# Patient Record
Sex: FEMALE | Race: WHITE | Employment: OTHER | URBAN - METROPOLITAN AREA
[De-identification: names, ages, dates, MRNs, and addresses within clinical notes are randomized per-mention and may not be internally consistent; named-entity substitution may affect disease eponyms.]

---

## 2018-09-19 PROBLEM — E03.9 ACQUIRED HYPOTHYROIDISM: Status: ACTIVE | Noted: 2018-09-19

## 2018-09-19 PROBLEM — E11.9 TYPE 2 DIABETES MELLITUS WITHOUT COMPLICATION, WITHOUT LONG-TERM CURRENT USE OF INSULIN (HCC): Status: ACTIVE | Noted: 2018-09-19

## 2018-09-19 PROBLEM — I48.91 ATRIAL FIBRILLATION (HCC): Status: ACTIVE | Noted: 2018-09-19

## 2018-09-19 PROBLEM — F03.90 DEMENTIA WITHOUT BEHAVIORAL DISTURBANCE (HCC): Status: ACTIVE | Noted: 2018-09-19

## 2018-09-19 PROBLEM — E66.01 SEVERE OBESITY (BMI 35.0-39.9): Status: ACTIVE | Noted: 2018-09-19

## 2018-10-06 ENCOUNTER — HOSPITAL ENCOUNTER (OUTPATIENT)
Dept: MAMMOGRAPHY | Age: 68
Discharge: HOME | End: 2018-10-06
Attending: FAMILY MEDICINE
Payer: MEDICARE

## 2018-10-06 DIAGNOSIS — Z12.39 SCREENING FOR MALIGNANT NEOPLASM OF BREAST: ICD-10-CM

## 2018-10-06 PROCEDURE — 77067 SCR MAMMO BI INCL CAD: CPT

## 2018-10-10 ENCOUNTER — HOSPITAL ENCOUNTER (OUTPATIENT)
Dept: MAMMOGRAPHY | Age: 68
Discharge: HOME | End: 2018-10-10
Attending: FAMILY MEDICINE
Payer: MEDICARE

## 2018-10-10 DIAGNOSIS — R92.8 ABNORMAL SCREENING MAMMOGRAM: ICD-10-CM

## 2018-10-10 PROCEDURE — 76642 ULTRASOUND BREAST LIMITED: CPT

## 2018-10-17 PROBLEM — N85.8 UTERINE MASS: Status: ACTIVE | Noted: 2018-10-17

## 2018-10-19 PROBLEM — Z79.01 CHRONIC ANTICOAGULATION: Status: ACTIVE | Noted: 2018-10-19

## 2018-10-19 PROBLEM — E66.09 CLASS 2 OBESITY DUE TO EXCESS CALORIES WITHOUT SERIOUS COMORBIDITY WITH BODY MASS INDEX (BMI) OF 37.0 TO 37.9 IN ADULT: Status: ACTIVE | Noted: 2018-09-19

## 2018-10-30 ENCOUNTER — HOSPITAL ENCOUNTER (OUTPATIENT)
Dept: MRI IMAGING | Age: 68
Discharge: HOME | End: 2018-10-30
Attending: OBSTETRICS & GYNECOLOGY
Payer: MEDICARE

## 2018-10-30 DIAGNOSIS — N85.8 UTERINE MASS: ICD-10-CM

## 2018-10-30 PROCEDURE — A9575 INJ GADOTERATE MEGLUMI 0.1ML: HCPCS | Performed by: OBSTETRICS & GYNECOLOGY

## 2018-10-30 PROCEDURE — 74011250636 HC RX REV CODE- 250/636: Performed by: OBSTETRICS & GYNECOLOGY

## 2018-10-30 PROCEDURE — 72197 MRI PELVIS W/O & W/DYE: CPT

## 2018-10-30 RX ORDER — GADOTERATE MEGLUMINE 376.9 MG/ML
21 INJECTION INTRAVENOUS
Status: COMPLETED | OUTPATIENT
Start: 2018-10-30 | End: 2018-10-30

## 2018-10-30 RX ORDER — SODIUM CHLORIDE 0.9 % (FLUSH) 0.9 %
10 SYRINGE (ML) INJECTION
Status: COMPLETED | OUTPATIENT
Start: 2018-10-30 | End: 2018-10-30

## 2018-10-30 RX ADMIN — Medication 10 ML: at 18:33

## 2018-10-30 RX ADMIN — GADOTERATE MEGLUMINE 21 ML: 376.9 INJECTION INTRAVENOUS at 18:32

## 2019-01-14 PROBLEM — E11.21 TYPE 2 DIABETES WITH NEPHROPATHY (HCC): Status: ACTIVE | Noted: 2019-01-14

## 2019-01-14 PROBLEM — E66.01 SEVERE OBESITY (HCC): Status: ACTIVE | Noted: 2019-01-14

## 2019-04-22 ENCOUNTER — HOSPITAL ENCOUNTER (EMERGENCY)
Age: 69
Discharge: HOME | End: 2019-04-22
Attending: EMERGENCY MEDICINE
Payer: MEDICARE

## 2019-04-22 ENCOUNTER — APPOINTMENT (OUTPATIENT)
Dept: GENERAL RADIOLOGY | Age: 69
End: 2019-04-22
Attending: EMERGENCY MEDICINE
Payer: MEDICARE

## 2019-04-22 VITALS
TEMPERATURE: 97.6 F | DIASTOLIC BLOOD PRESSURE: 86 MMHG | SYSTOLIC BLOOD PRESSURE: 139 MMHG | HEART RATE: 63 BPM | RESPIRATION RATE: 16 BRPM | OXYGEN SATURATION: 96 %

## 2019-04-22 DIAGNOSIS — M25.562 ARTHRALGIA OF LEFT KNEE: Primary | ICD-10-CM

## 2019-04-22 PROCEDURE — 74011250637 HC RX REV CODE- 250/637: Performed by: NURSE PRACTITIONER

## 2019-04-22 PROCEDURE — 99283 EMERGENCY DEPT VISIT LOW MDM: CPT | Performed by: NURSE PRACTITIONER

## 2019-04-22 PROCEDURE — 73562 X-RAY EXAM OF KNEE 3: CPT

## 2019-04-22 RX ORDER — ACETAMINOPHEN 325 MG/1
650 TABLET ORAL
Qty: 20 TAB | Refills: 0 | Status: SHIPPED | OUTPATIENT
Start: 2019-04-22 | End: 2021-06-04 | Stop reason: SDUPTHER

## 2019-04-22 RX ORDER — ACETAMINOPHEN 325 MG/1
650 TABLET ORAL
Status: COMPLETED | OUTPATIENT
Start: 2019-04-22 | End: 2019-04-22

## 2019-04-22 RX ADMIN — ACETAMINOPHEN 650 MG: 325 TABLET, FILM COATED ORAL at 16:27

## 2019-04-22 NOTE — ED TRIAGE NOTES
Patient presents to ed via ems from [facility] for left knee pain that has been ongoing for some time but is worse today and she states she is unable to bear weight on it to ambulate with her walker. Patient denies any new injury to knee and states pain is to the back of her knee.

## 2019-04-22 NOTE — PROGRESS NOTES
Met with patient and family in [facility] per NP request.  Patient and daughter state she is currently living at [facility]. Patient states she uses a walker but cannot put weight on knee. Per NP, patient does not have restrictions on knee at this time but patient states too painful. Daughter concerned that [facility] will not let her return or help her. Spoke with staff at [facility], they state she can use a W/C at facility and they have one she can use / they also state they have aides \"24/7\" to assist.  They state they have therapy there too if needed. Per NP patient will follow up with POA and they will make decision on any therapy at that time. Daughter and patient requesting Truett Gent back to facility / nurse and NP aware.

## 2019-04-22 NOTE — ED PROVIDER NOTES
[address] Emergency Department TRIAGE Provider NOTE:  
79 yo female presents with left knee pain for a few years. Suddenly pain worsened this morning and unable to walk. Knee buckled after standing today. No fall. No injury or fever. Knee swelling and tenderness on exam. Able to hold leg in extension without support but with pain. Appropriate tests ordered. Awaiting room. Capri Nichole MD; 4/22/2019 @2:46 PM=========================================== The history is provided by the patient. Past Medical History:  
Diagnosis Date  Atrial fibrillation ([facility] [address] [address]  Colon cancer ([facility] [address] 1999  Dementia   
 with memory loss  [facility]  Menopause  Thyroid disease  Uterine mass Past Surgical History:  
Procedure Laterality Date [address] Family History:  
Problem Relation Age of Onset  Diabetes Mother  Diabetes Father Social History Socioeconomic History  Marital status:  Spouse name: Not on file  Number of children: Not on file  Years of education: Not on file  Highest education level: Not on file Occupational History  Not on file Social Needs  Financial resource strain: Not on file  Food insecurity:  
  Worry: Not on file Inability: Not on file  Transportation needs:  
  Medical: Not on file Non-medical: Not on file Tobacco Use  Smoking status: Never Smoker  Smokeless tobacco: Never Used Substance and Sexual Activity  Alcohol use: No  
 Drug use: No  
 Sexual activity: Not on file Lifestyle  Physical activity:  
  Days per week: Not on file Minutes per session: Not on file  Stress: Not on file Relationships  Social connections:  
  Talks on phone: Not on file Gets together: Not on file Attends [employer] service: Not on file Active member of club or organization: Not on file Attends meetings of clubs or organizations: Not on file Relationship status: Not on file  Intimate partner violence:  
  Fear of current or ex partner: Not on file Emotionally abused: Not on file Physically abused: Not on file Forced sexual activity: Not on file Other Topics Concern  Not on file Social History Narrative  Not on file ALLERGIES: Patient has no known allergies. Review of Systems Constitutional: Negative for chills and fever. Gastrointestinal: Negative for abdominal pain. Musculoskeletal: Positive for arthralgias and joint swelling. Skin: Negative for color change. Visit Vitals /86 Pulse 63 Temp 97.6 °F (36.4 °C) Resp 16 SpO2 96% Physical Exam  
Constitutional: She is oriented to person, place, and time. No distress. HENT:  
Head: Normocephalic and atraumatic. Eyes: Conjunctivae and EOM are normal.  
Neck: Normal range of motion. Neck supple. Cardiovascular: Normal rate and regular rhythm. Pulmonary/Chest: Effort normal and breath sounds normal.  
Musculoskeletal:  
     Left knee: She exhibits swelling. She exhibits normal range of motion and no erythema. Tenderness found. Patellar tendon tenderness noted. Neurological: She is alert and oriented to person, place, and time. Skin: Skin is warm and dry. She is not diaphoretic. Psychiatric: She has a normal mood and affect. Her behavior is normal.  
Nursing note and vitals reviewed. Xr Knee Lt 3 V Result Date: 4/22/2019 Left knee Clinical indication: Patient with acute worsening of chronic moderate to severe left knee pain, now unable to bear weight Comparison: none Technique: 3 views Findings: There is no evidence of fracture, dislocation, or erosion. Overall the bone density is low which can limit sensitivity for subtle bony lesions.  Within the proximal shaft of the tibia centrally there is a 1.5 cm well-defined sclerotic lesion with chondroid matrix suggesting a chronic incidental enchondroma. There is moderate to marked chronic appearing tricompartmental degenerative osteoarthrosis change, most probably seen at the medial femoral-tibial level demonstrated by joint space narrowing, cortical sclerosis, and osteophyte formation. No definite joint effusion is seen. There are small benign accessory ossicles posteriorly. IMPRESSION: 1. No acute osseous abnormality. 2. Low bone density, degenerative change. MDM Number of Diagnoses or Management Options Arthralgia of left knee: new and requires workup Diagnosis management comments: Xray negative for acute changes. Ace wrap applied and patient referred to orthopedics for follow up. Amount and/or Complexity of Data Reviewed Tests in the radiology section of CPT®: ordered and reviewed Tests in the medicine section of CPT®: ordered Risk of Complications, Morbidity, and/or Mortality Presenting problems: low Diagnostic procedures: low Management options: low Patient Progress Patient progress: stable ED Course as of Apr 22 1631 Mon Apr 22, 2019  
[address]  is coming to speak with patient regarding discharge. [JM] ED Course User Index [JM] Debbi Davies, APRN Procedures

## 2019-04-22 NOTE — DISCHARGE INSTRUCTIONS
Wear your ace wrap for comfort and support during the day. Use your wheelchair as needed for pain. Elevate your knee and use ice. Tylenol as needed for pain. Call orthopedics to schedule a follow up appointment. Return to the Emergency Department as needed.

## 2019-04-25 PROBLEM — E78.00 PURE HYPERCHOLESTEROLEMIA: Status: ACTIVE | Noted: 2019-04-25

## 2019-11-16 ENCOUNTER — HOSPITAL ENCOUNTER (OUTPATIENT)
Dept: MAMMOGRAPHY | Age: 69
Discharge: HOME | End: 2019-11-16
Attending: FAMILY MEDICINE
Payer: MEDICARE

## 2019-11-16 DIAGNOSIS — Z12.31 VISIT FOR SCREENING MAMMOGRAM: ICD-10-CM

## 2019-11-16 PROCEDURE — 77067 SCR MAMMO BI INCL CAD: CPT

## 2022-03-18 PROBLEM — F03.90 DEMENTIA WITHOUT BEHAVIORAL DISTURBANCE (HCC): Status: ACTIVE | Noted: 2018-09-19

## 2022-03-18 PROBLEM — E66.09 CLASS 2 OBESITY DUE TO EXCESS CALORIES WITHOUT SERIOUS COMORBIDITY WITH BODY MASS INDEX (BMI) OF 37.0 TO 37.9 IN ADULT: Status: ACTIVE | Noted: 2018-09-19

## 2022-03-18 PROBLEM — E11.9 TYPE 2 DIABETES MELLITUS WITHOUT COMPLICATION, WITHOUT LONG-TERM CURRENT USE OF INSULIN (HCC): Status: ACTIVE | Noted: 2018-09-19

## 2022-03-18 PROBLEM — E11.21 TYPE 2 DIABETES WITH NEPHROPATHY (HCC): Status: ACTIVE | Noted: 2019-01-14

## 2022-03-19 PROBLEM — E03.9 ACQUIRED HYPOTHYROIDISM: Status: ACTIVE | Noted: 2018-09-19

## 2022-03-19 PROBLEM — Z79.01 CHRONIC ANTICOAGULATION: Status: ACTIVE | Noted: 2018-10-19

## 2022-03-19 PROBLEM — E66.01 SEVERE OBESITY (HCC): Status: ACTIVE | Noted: 2019-01-14

## 2022-03-19 PROBLEM — I48.91 ATRIAL FIBRILLATION (HCC): Status: ACTIVE | Noted: 2018-09-19

## 2022-03-19 PROBLEM — N85.8 UTERINE MASS: Status: ACTIVE | Noted: 2018-10-17

## 2022-03-19 PROBLEM — E78.00 HYPERCHOLESTEROLEMIA: Status: ACTIVE | Noted: 2019-04-25

## 2022-06-12 ENCOUNTER — PATIENT MESSAGE (OUTPATIENT)
Dept: FAMILY MEDICINE CLINIC | Facility: CLINIC | Age: 72
End: 2022-06-12

## 2022-06-13 LAB
MM INDURATION, POC: 0 MM (ref 0–5)
PPD, POC: NEGATIVE

## 2022-06-13 NOTE — TELEPHONE ENCOUNTER
From: Casi Moreland  To: Nunu King  Sent: 6/12/2022 12:24 PM EDT  Subject: Orders to Check Glucose     Hello,  [facility] doesn't have orders to check Eileen's glucose. Yesterday, she was not very responsive. Med Marilyn checked her sugar level--it was 38. Medics were called and said normal is 80 to 240. I took a photo of the last orders that were given to Tonja, and I did not see an order to check for glucose. Please look into this. Altagracia Escobar has been a bit non responsive for the last three weeks, not going to dinner and maybe because no one is checking her sugar levels. Please let me know if you will fax [phone] orders or what happened to the orders. Also attaching \"Authorization for Release of Protected Health Information\" as [facility] staff they did not have it on file. Also, yeast infection in groin is terrible. Under breast cleared. Thanks for all you do, please keep me informed.

## 2022-06-17 RX ORDER — LANCETS 28 GAUGE
EACH MISCELLANEOUS
Qty: 100 EACH | Refills: 3 | Status: SHIPPED | OUTPATIENT
Start: 2022-06-17

## 2022-06-17 RX ORDER — BLOOD-GLUCOSE METER
KIT MISCELLANEOUS
Qty: 100 STRIP | Refills: 3 | Status: SHIPPED | OUTPATIENT
Start: 2022-06-17

## 2022-08-11 ENCOUNTER — APPOINTMENT (OUTPATIENT)
Dept: GENERAL RADIOLOGY | Age: 72
DRG: 556 | End: 2022-08-11
Payer: MEDICARE

## 2022-08-11 ENCOUNTER — APPOINTMENT (OUTPATIENT)
Dept: CT IMAGING | Age: 72
DRG: 556 | End: 2022-08-11
Payer: MEDICARE

## 2022-08-11 ENCOUNTER — HOSPITAL ENCOUNTER (EMERGENCY)
Age: 72
Discharge: NURSING, REHAB OR LONG-TERM CARE | DRG: 556 | End: 2022-08-12
Attending: EMERGENCY MEDICINE
Payer: MEDICARE
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
## 2022-08-11 DIAGNOSIS — M25.562 ACUTE PAIN OF LEFT KNEE: ICD-10-CM

## 2022-08-11 DIAGNOSIS — R26.2 INABILITY TO WALK: ICD-10-CM

## 2022-08-11 PROCEDURE — 72192 CT PELVIS W/O DYE: CPT

## 2022-08-11 PROCEDURE — 73562 X-RAY EXAM OF KNEE 3: CPT

## 2022-08-11 PROCEDURE — 99285 EMERGENCY DEPT VISIT HI MDM: CPT

## 2022-08-11 PROCEDURE — 6370000000 HC RX 637 (ALT 250 FOR IP): Performed by: EMERGENCY MEDICINE

## 2022-08-11 PROCEDURE — 70450 CT HEAD/BRAIN W/O DYE: CPT

## 2022-08-11 RX ORDER — ACETAMINOPHEN 500 MG
1000 TABLET ORAL
Status: COMPLETED | OUTPATIENT
Start: 2022-08-11 | End: 2022-08-11

## 2022-08-11 RX ADMIN — ACETAMINOPHEN 1000 MG: 500 TABLET, FILM COATED ORAL at 18:58

## 2022-08-11 ASSESSMENT — ENCOUNTER SYMPTOMS
VOICE CHANGE: 0
FACIAL SWELLING: 0
COUGH: 0
NAUSEA: 0
BACK PAIN: 0
EYE PAIN: 0
CHEST TIGHTNESS: 0
SORE THROAT: 0
SHORTNESS OF BREATH: 0
TROUBLE SWALLOWING: 0
VOMITING: 0
ABDOMINAL PAIN: 0

## 2022-08-11 ASSESSMENT — PAIN SCALES - GENERAL
PAINLEVEL_OUTOF10: 0
PAINLEVEL_OUTOF10: 10
PAINLEVEL_OUTOF10: 0

## 2022-08-11 ASSESSMENT — PAIN - FUNCTIONAL ASSESSMENT: PAIN_FUNCTIONAL_ASSESSMENT: 0-10

## 2022-08-11 NOTE — ED PROVIDER NOTES
[employer] Emergency Department Provider Note                   PCP:                Kenneth Parra, APRN - CNP               Age: 67 y.o. Sex: female       ICD-10-CM    1. Fall from ground level  W18.30XA       2. Acute pain of left knee  M25.562       3. Inability to walk  R26.2           DISPOSITION Decision To Transfer 08/11/2022 07:34:37 PM        MDM  Number of Diagnoses or Management Options  Acute pain of left knee  Fall from ground level  Inability to walk  Diagnosis management comments: 70-year-old female presents emerged department via EMS with chief complaint of inability to walk and left knee pain. X-rays from triage showed no evidence of any acute fractures of the left knee. She is actually have able to range of motion of her left knee pretty well. She does seem however fairly weak. CT of the head and CT of the pelvis were obtained. Tylenol for pain control. Shows no evidence of any acute intracranial process  CT of the pelvis shows no evidence of any acute fractures. There is an unknown pelvic mass seen there which they recommend OB follow-up. X-ray of her knee showed no evidence of any acute fractures. The patient is remained to be nontoxic in appearance I told her that she needs to ambulate before she can go home. Every time we try to get up out of bed without assistance her left knee sandeep and she will fall to the floor if we are not there at hold her. Daughter states that the this is living is refusing to take the patient back if she is unable to ambulate on her own. At this point I do think patient probably should be admitted for inability to ambulate and will need to be placed into rehab facility she may even need to have an MRI of the knee to see if there is any ligamentous injury. I Have contacted the on-call hospitalist to see if they are agreeable for admission.          Orders Placed This Encounter   Procedures    XR KNEE LEFT (3 VIEWS)    CT HEAD WO CONTRAST    CT PELVIS fibrillation ([facility] [address] [facility]     Colon cancer ([facility] [address] [facility] 1999    Dementia ([facility] [address] [facility]     with memory loss    Diabetes ([facility] [address] [facility]     Menopause     Thyroid disease     Uterine mass         Past Surgical History:   Procedure Laterality Date    TOTAL COLECTOMY  1999        Family History   Problem Relation Age of Onset    Diabetes Mother     Diabetes Father         Social History     Socioeconomic History    Marital status:    Tobacco Use    Smoking status: Never    Smokeless tobacco: Never   Substance and Sexual Activity    Alcohol use: No    Drug use: No         Patient has no known allergies. Discharge Medication List as of 8/12/2022  1:17 AM        CONTINUE these medications which have NOT CHANGED    Details   FREESTYLE LITE strip USE TO CHECK BLOOD GLUCOSE ONCE DAILY, Disp-100 strip, R-3Normal      FreeStyle Lancets MISC Disp-100 each, R-3, Normal      acetaminophen (TYLENOL) 325 MG tablet Take 650 mg by mouth every 4 hours as neededHistorical Med      ascorbic acid (VITAMIN C) 250 MG tablet Take 250 mg by mouth dailyHistorical Med      atorvastatin (LIPITOR) 40 MG tablet TAKE 1 TABLET DAILYHistorical Med      levothyroxine (SYNTHROID) 75 MCG tablet TAKE 1 TABLET DAILY BEFORE BREAKFASTHistorical Med      memantine (NAMENDA) 10 MG tablet TAKE 1 TABLET TWICE A DAYHistorical Med      nystatin (MYCOSTATIN) 410722 UNIT/GM powder Apply powder to skin folds between breasts and abdomen 4 times daily, Historical Med      SITagliptin (JANUVIA) 100 MG tablet Take 100 mg by mouth dailyHistorical Med      glyBURIDE-metFORMIN (GLUCOVANCE) 2.5-500 MG per tablet TAKE 2 TABLETS TWICE A DAY WITH MEALSHistorical Med      metoprolol succinate (TOPROL XL) 25 MG extended release tablet Take 25 mg by mouth dailyHistorical Med              Vitals signs and nursing note reviewed. No data found. Physical Exam  Vitals and nursing note reviewed. Constitutional:       General: She is not in acute distress. Appearance: Normal appearance.  She is not ill-appearing. HENT:      Head: Normocephalic and atraumatic. Right Ear: Tympanic membrane normal.      Left Ear: Tympanic membrane normal.      Mouth/Throat:      Mouth: Mucous membranes are moist.      Pharynx: Oropharynx is clear. Eyes:      Extraocular Movements: Extraocular movements intact. Pupils: Pupils are equal, round, and reactive to light. Cardiovascular:      Rate and Rhythm: Normal rate and regular rhythm. Heart sounds: No murmur heard. Pulmonary:      Effort: No respiratory distress. Breath sounds: No wheezing or rhonchi. Abdominal:      Palpations: Abdomen is soft. There is no mass. Tenderness: There is no abdominal tenderness. There is no guarding. Musculoskeletal:         General: No swelling or tenderness. Cervical back: Normal range of motion and neck supple. No rigidity or tenderness. Skin:     General: Skin is warm and dry. Capillary Refill: Capillary refill takes less than 2 seconds. Neurological:      General: No focal deficit present. Mental Status: She is alert and oriented to person, place, and time. Mental status is at baseline. Psychiatric:         Mood and Affect: Mood normal.         Behavior: Behavior normal.        Procedures      Labs Reviewed - No data to display     CT HEAD WO CONTRAST   Final Result   1. No acute intracranial process evident by noncontrast CT study of the head. This report was made using voice transcription. Despite my best efforts to avoid   any, transcription errors may persist. If there is any question about the   accuracy of the report or need for clarification, then please call [phone], or text me through perfectserv for clarification or correction. CT PELVIS WO CONTRAST Additional Contrast? None   Final Result   1. No evidence of pelvic or hip fracture. Hips are located bilaterally.    2. Large soft tissue pelvic mass measuring up to 15 cm likely arising from the

## 2022-08-12 ENCOUNTER — HOSPITAL ENCOUNTER (INPATIENT)
Age: 72
LOS: 5 days | Discharge: NURSING, REHAB OR LONG-TERM CARE | DRG: 556 | End: 2022-08-17
Attending: FAMILY MEDICINE
Payer: MEDICARE

## 2022-08-12 VITALS
TEMPERATURE: 98 F | BODY MASS INDEX: 44.72 KG/M2 | OXYGEN SATURATION: 93 % | HEART RATE: 87 BPM | RESPIRATION RATE: 18 BRPM | DIASTOLIC BLOOD PRESSURE: 61 MMHG | SYSTOLIC BLOOD PRESSURE: 129 MMHG | WEIGHT: 290 LBS

## 2022-08-12 PROBLEM — N85.8 UTERINE MASS: Status: ACTIVE | Noted: 2018-10-17

## 2022-08-12 PROBLEM — I48.91 ATRIAL FIBRILLATION (HCC): Status: ACTIVE | Noted: 2018-09-19

## 2022-08-12 PROBLEM — F03.90 DEMENTIA WITHOUT BEHAVIORAL DISTURBANCE (HCC): Status: ACTIVE | Noted: 2018-09-19

## 2022-08-12 PROBLEM — E03.9 ACQUIRED HYPOTHYROIDISM: Status: ACTIVE | Noted: 2018-09-19

## 2022-08-12 PROBLEM — E11.9 TYPE 2 DIABETES MELLITUS WITHOUT COMPLICATION, WITHOUT LONG-TERM CURRENT USE OF INSULIN (HCC): Status: ACTIVE | Noted: 2018-09-19

## 2022-08-12 LAB
ANION GAP SERPL CALC-SCNC: 7 MMOL/L (ref 7–16)
BUN SERPL-MCNC: 19 MG/DL (ref 8–23)
CALCIUM SERPL-MCNC: 8.9 MG/DL (ref 8.3–10.4)
CHLORIDE SERPL-SCNC: 110 MMOL/L (ref 98–107)
CO2 SERPL-SCNC: 24 MMOL/L (ref 21–32)
CREAT SERPL-MCNC: 0.9 MG/DL (ref 0.6–1)
GLUCOSE SERPL-MCNC: 124 MG/DL (ref 65–100)
POTASSIUM SERPL-SCNC: 4 MMOL/L (ref 3.5–5.1)
SODIUM SERPL-SCNC: 141 MMOL/L (ref 136–145)

## 2022-08-12 PROCEDURE — 97530 THERAPEUTIC ACTIVITIES: CPT

## 2022-08-12 PROCEDURE — 80048 BASIC METABOLIC PNL TOTAL CA: CPT

## 2022-08-12 PROCEDURE — 2500000003 HC RX 250 WO HCPCS: Performed by: FAMILY MEDICINE

## 2022-08-12 PROCEDURE — 2580000003 HC RX 258: Performed by: FAMILY MEDICINE

## 2022-08-12 PROCEDURE — 6360000002 HC RX W HCPCS: Performed by: FAMILY MEDICINE

## 2022-08-12 PROCEDURE — 6370000000 HC RX 637 (ALT 250 FOR IP): Performed by: FAMILY MEDICINE

## 2022-08-12 PROCEDURE — 97166 OT EVAL MOD COMPLEX 45 MIN: CPT

## 2022-08-12 PROCEDURE — 97112 NEUROMUSCULAR REEDUCATION: CPT

## 2022-08-12 PROCEDURE — 1100000000 HC RM PRIVATE

## 2022-08-12 PROCEDURE — 97162 PT EVAL MOD COMPLEX 30 MIN: CPT

## 2022-08-12 RX ORDER — HYDROCODONE BITARTRATE AND ACETAMINOPHEN 5; 325 MG/1; MG/1
1 TABLET ORAL EVERY 6 HOURS PRN
Status: DISCONTINUED | OUTPATIENT
Start: 2022-08-12 | End: 2022-08-17 | Stop reason: HOSPADM

## 2022-08-12 RX ORDER — METOPROLOL SUCCINATE 25 MG/1
25 TABLET, EXTENDED RELEASE ORAL DAILY
Status: DISCONTINUED | OUTPATIENT
Start: 2022-08-12 | End: 2022-08-17 | Stop reason: HOSPADM

## 2022-08-12 RX ORDER — ACETAMINOPHEN 325 MG/1
650 TABLET ORAL EVERY 6 HOURS PRN
Status: DISCONTINUED | OUTPATIENT
Start: 2022-08-12 | End: 2022-08-17 | Stop reason: HOSPADM

## 2022-08-12 RX ORDER — POLYETHYLENE GLYCOL 3350 17 G/17G
17 POWDER, FOR SOLUTION ORAL DAILY PRN
Status: DISCONTINUED | OUTPATIENT
Start: 2022-08-12 | End: 2022-08-17 | Stop reason: HOSPADM

## 2022-08-12 RX ORDER — SODIUM CHLORIDE 9 MG/ML
INJECTION, SOLUTION INTRAVENOUS PRN
Status: DISCONTINUED | OUTPATIENT
Start: 2022-08-12 | End: 2022-08-17 | Stop reason: HOSPADM

## 2022-08-12 RX ORDER — LEVOTHYROXINE SODIUM 0.07 MG/1
75 TABLET ORAL DAILY
Status: DISCONTINUED | OUTPATIENT
Start: 2022-08-12 | End: 2022-08-17 | Stop reason: HOSPADM

## 2022-08-12 RX ORDER — ONDANSETRON 4 MG/1
4 TABLET, ORALLY DISINTEGRATING ORAL EVERY 8 HOURS PRN
Status: DISCONTINUED | OUTPATIENT
Start: 2022-08-12 | End: 2022-08-17 | Stop reason: HOSPADM

## 2022-08-12 RX ORDER — MEMANTINE HYDROCHLORIDE 5 MG/1
10 TABLET ORAL 2 TIMES DAILY
Status: DISCONTINUED | OUTPATIENT
Start: 2022-08-12 | End: 2022-08-17 | Stop reason: HOSPADM

## 2022-08-12 RX ORDER — ONDANSETRON 2 MG/ML
4 INJECTION INTRAMUSCULAR; INTRAVENOUS EVERY 6 HOURS PRN
Status: DISCONTINUED | OUTPATIENT
Start: 2022-08-12 | End: 2022-08-17 | Stop reason: HOSPADM

## 2022-08-12 RX ORDER — ACETAMINOPHEN 650 MG/1
650 SUPPOSITORY RECTAL EVERY 6 HOURS PRN
Status: DISCONTINUED | OUTPATIENT
Start: 2022-08-12 | End: 2022-08-17 | Stop reason: HOSPADM

## 2022-08-12 RX ORDER — ATORVASTATIN CALCIUM 40 MG/1
40 TABLET, FILM COATED ORAL DAILY
Status: DISCONTINUED | OUTPATIENT
Start: 2022-08-12 | End: 2022-08-17 | Stop reason: HOSPADM

## 2022-08-12 RX ORDER — ENOXAPARIN SODIUM 100 MG/ML
30 INJECTION SUBCUTANEOUS EVERY 12 HOURS
Status: DISCONTINUED | OUTPATIENT
Start: 2022-08-12 | End: 2022-08-17 | Stop reason: HOSPADM

## 2022-08-12 RX ORDER — SODIUM CHLORIDE 0.9 % (FLUSH) 0.9 %
5-40 SYRINGE (ML) INJECTION PRN
Status: DISCONTINUED | OUTPATIENT
Start: 2022-08-12 | End: 2022-08-17 | Stop reason: HOSPADM

## 2022-08-12 RX ORDER — SODIUM CHLORIDE 0.9 % (FLUSH) 0.9 %
5-40 SYRINGE (ML) INJECTION EVERY 12 HOURS SCHEDULED
Status: DISCONTINUED | OUTPATIENT
Start: 2022-08-12 | End: 2022-08-17 | Stop reason: HOSPADM

## 2022-08-12 RX ADMIN — METOPROLOL SUCCINATE 25 MG: 25 TABLET, EXTENDED RELEASE ORAL at 08:24

## 2022-08-12 RX ADMIN — LEVOTHYROXINE SODIUM 75 MCG: 0.07 TABLET ORAL at 06:37

## 2022-08-12 RX ADMIN — MEMANTINE HYDROCHLORIDE 10 MG: 5 TABLET, FILM COATED ORAL at 20:45

## 2022-08-12 RX ADMIN — ENOXAPARIN SODIUM 30 MG: 100 INJECTION SUBCUTANEOUS at 08:24

## 2022-08-12 RX ADMIN — ENOXAPARIN SODIUM 30 MG: 100 INJECTION SUBCUTANEOUS at 20:45

## 2022-08-12 RX ADMIN — ATORVASTATIN CALCIUM 40 MG: 40 TABLET, FILM COATED ORAL at 08:23

## 2022-08-12 RX ADMIN — TUBERCULIN PURIFIED PROTEIN DERIVATIVE 5 UNITS: 5 INJECTION, SOLUTION INTRADERMAL at 06:35

## 2022-08-12 RX ADMIN — MEMANTINE HYDROCHLORIDE 10 MG: 5 TABLET, FILM COATED ORAL at 08:24

## 2022-08-12 RX ADMIN — SODIUM CHLORIDE, PRESERVATIVE FREE 10 ML: 5 INJECTION INTRAVENOUS at 08:26

## 2022-08-12 ASSESSMENT — PAIN SCALES - GENERAL
PAINLEVEL_OUTOF10: 0
PAINLEVEL_OUTOF10: 0
PAINLEVEL_OUTOF10: 6
PAINLEVEL_OUTOF10: 8
PAINLEVEL_OUTOF10: 0

## 2022-08-12 NOTE — ED NOTES
Report given to EMT prior to transport. VSS.  All paperwork with patient, report previously called to receiving PENNY Everett RN  08/12/22 [id number]

## 2022-08-12 NOTE — ED NOTES
TRANSFER - IN REPORT:    Verbal report given on Yenifer Late  being given to Betsy Ortega  on 7th floor [place]rout[place] progression of patient care, transfer      Report consisted of patient's Situation, Background, Assessment and   Recommendations(SBAR). Information from the following report(s) Nurse Handoff Report was reviewed with the receiving nurse. Opportunity for questions and clarification was provided. Assessment completed upon patient's arrival to unit and care assumed.         Leif Aceves RN  08/11/22 1197

## 2022-08-12 NOTE — PROGRESS NOTES
in the bed. At this time, pt is an appropriate candidate for skilled PT and will benefit from POC designed to address the aforementioned deficits. Upon completion of treatment, pt was positioned to comfort in bed with needs in reach. RN was made aware of pt performance.      DC Recommendation: STR     [facility] 6 Clicks Basic Mobility Inpatient Short Form  -PAC Mobility Inpatient   How much difficulty turning over in bed?: A Little  How much difficulty sitting down on / standing up from a chair with arms?: Unable  How much difficulty moving from lying on back to sitting on side of bed?: A Little  How much help from another person moving to and from a bed to a chair?: Total  How much help from another person needed to walk in hospital room?: Total  How much help from another person for climbing 3-5 steps with a railing?: Total  AM-PAC Inpatient Mobility Raw Score : 10  AM-PAC Inpatient T-Scale Score : 32.29  Mobility Inpatient CMS 0-100% Score: 76.75  Mobility Inpatient CMS G-Code Modifier : CL    SUBJECTIVE:   Ms. Jurgen Whitmore states, \"I was in the [facility] for 24 years\"     Social/Functional      OBJECTIVE:     PAIN: Olivia Serene / O2: Gerlene Samuels / Daphnie Gonzales / Solis Fus:   Pre Treatment:   Pain Assessment: 0-10  Pain Level: 8      Post Treatment: 8   Vitals        Oxygen  O2 Therapy: Room air   IV and Purewick    RESTRICTIONS/PRECAUTIONS:  Restrictions/Precautions: Fall Risk                 GROSS EVALUATION: Intact Impaired (Comments):   AROM []  L knee limited by pain   PROM [] L knee limited by pain   Strength []  Globally weak/ deconditioned but LLE knee ext <3/5   Balance []  Good sitting    Posture [] Forward Head  Thoracic Kyphosis   Sensation []  WNL   Coordination []   WNL   Tone []     Edema []    Activity Tolerance []  Well-below baseline level    []      COGNITION/  PERCEPTION: Intact Impaired (Comments):   Orientation [x]  AxOx4 but intermittent confusion and delayed responses   Vision [x]  Not Formally Assessed    Hearing [x]  Not Formally Assessed    Cognition  []   intermittent confusion and delayed responses     MOBILITY: I Mod I S SBA CGA Min Mod Max Total  NT x2 Comments:   Bed Mobility    Rolling [] [] [] [] [x] [] [] [] [] [] []    Supine to Sit [] [] [] [] [x] [] [] [] [] [] []    Scooting [] [] [] [] [] [] [x] [] [] [] [x] Use of ronald pad to scoot upwards   Sit to Supine [] [] [] [] [] [] [] [] [] [] []    Transfers    Sit to Stand [] [] [] [] [] [] [] [x] [] [] [x] Unable to clear buttocks   Bed to Chair [] [] [] [] [] [] [] [] [] [] []    Stand to Sit [] [] [] [] [] [] [] [x] [] [] [x] Unable to clear buttocks    [] [] [] [] [] [] [] [] [] [] []    I=Independent, Mod I=Modified Independent, S=Supervision, SBA=Standby Assistance, CGA=Contact Guard Assistance,   Min=Minimal Assistance, Mod=Moderate Assistance, Max=Maximal Assistance, Total=Total Assistance, NT=Not Tested    GAIT: I Mod I S SBA CGA Min Mod Max Total  NT x2 Comments:   Level of Assistance [] [] [] [] [] [] [] [] [] [x] []    Distance   feet    DME N/A    Gait Quality N/A    Weightbearing Status Restrictions/Precautions  Restrictions/Precautions: Fall Risk    Stairs      I=Independent, Mod I=Modified Independent, S=Supervision, SBA=Standby Assistance, CGA=Contact Guard Assistance,   Min=Minimal Assistance, Mod=Moderate Assistance, Max=Maximal Assistance, Total=Total Assistance, NT=Not Tested    PLAN:   ACUTE PHYSICAL THERAPY GOALS:   (Developed with and agreed upon by patient and/or caregiver.)  Pt will perform bed mobility Mod (I) c inc time and cueing in 7 therapy sessions. Pt will perform FULL sit-to-stand/ stand-to-sit transfers c Max (A)x2 in 7 therapy sessions. Pt will ambulate 10 ft Mod (A) with use of LRAD and breaks as needed in 7 therapy sessions. Pt will perform standing dynamic balance activities with minimal postural sway in 7 therapy sessions.   Pt will tolerate multiple sets and reps of BLE exercises in 7 therapy sessions. FREQUENCY AND DURATION: 3 times/week for duration of hospital stay or until stated goals are met, whichever comes first.    THERAPY PROGNOSIS: Guarded    PROBLEM LIST:   (Skilled intervention is medically necessary to address:)  Decreased ADL/Functional Activities  Decreased Activity Tolerance  Decreased AROM/PROM  Decreased Balance  Decreased Cognition  Decreased Gait Ability  Decreased Strength  Decreased Transfer Abilities  Increased Pain INTERVENTIONS PLANNED:   (Benefits and precautions of physical therapy have been discussed with the patient.)  Self Care Training  Therapeutic Activity  Therapeutic Exercise/HEP  Gait Training  Education       TREATMENT:   EVALUATION: MODERATE COMPLEXITY: (Untimed Charge)    TREATMENT:   Therapeutic Activity (23 Minutes): Therapeutic activity included Supine to Sit, Sit to Supine, Scooting, Lateral Scooting, Transfer Training, and Sitting balance  to improve functional Activity tolerance, Balance, Coordination, Mobility, Strength, and ROM. TREATMENT GRID:  N/A    AFTER TREATMENT PRECAUTIONS: Alarm Activated, Bed, Bed/Chair Locked, Call light within reach, Needs within reach, RN notified, and Visitors at bedside    INTERDISCIPLINARY COLLABORATION:  RN/ PCT, PT/ PTA, and OT/ JAUREGUI    EDUCATION: Education Given To: Patient; Family;Caregiver  Education Provided: Role of Therapy    TIME IN/OUT:  Time In: 1100  Time Out: 1 Avelino Lloyd  Minutes: 30    [place], [place]

## 2022-08-12 NOTE — PROGRESS NOTES
Hospitalist Progress Note   Admit Date:  2022  2:11 AM   Name:  Montrell Lott   Age:  67 y.o. Sex:  female  :  1950   MRN:  [id number]   Room:  [facility]    Presenting Complaint: No chief complaint on file. Reason(s) for Admission: Inability to walk [R26.2]     Hospital Course & Interval History:   Montrell Lott is a 67 y.o. female with medical history of DM2, afib, hypothyroidism, dementia who presented to ED after a fall at [facility]. She has been unable to ambulate since the fall. At baseline she walks with a walker. In the ER  XR negative for fracture or dislocation. CT pelvis without any evidence of pelvic or hip fracture. There is mention of a \"large soft tissue pelvic mass\" known to patient and daughter. Due to residential not being able to take patient back if she cannot ambulate, hospitalist consulted for admission. Subjective/24hr Events (22):  Endorses pain left knee    ROS:  10 systems reviewed and negative except as noted above. Assessment & Plan:   Inability to Ambulate  22  - No evidence of fractures on imaging. Patient reports knee pain after fall makes it difficult to ambulate  - PT/OT with recs for STR  - Prn analgesia  - PPD ordered     DM2  - Accu checks with SSI  - A1c 6.3 3/14/22     Afib  - Rate controlled  - Home meds     Hypothyroidism  - Continue home synthroid     Uterine Mass  - Known  - Follows with her own physician for this     Dementia  - Home meds        Discharge Planning:      Dispo pending     Diet:  ADULT DIET; Regular  DVT PPx: Lovenox SQ  Code status: FULL CODE        Objective:   Patient Vitals for the past 24 hrs:   Temp Pulse Resp BP SpO2   22 0730 97.7 °F (36.5 °C) (!) 110 19 (!) 143/70 97 %   22 0214 97.6 °F (36.4 °C) 100 16 132/65 94 %         Estimated body mass index is 44.72 kg/m² as calculated from the following:    Height as of 9/15/21: 5' 7.52\" (1.715 m).     Weight as of 22: 290 lb (131.5 kg).  No intake or output data in the 24 hours ending 08/12/22 1200      Physical Exam:     Blood pressure (!) 143/70, pulse (!) 110, temperature 97.7 °F (36.5 °C), temperature source Oral, resp. rate 19, SpO2 97 %. General:    No overt distress  Head:  Normocephalic, atraumatic  Eyes:  Sclerae appear normal.  Pupils equally round. ENT:  Nares appear normal, no drainage. Moist oral mucosa  Neck:  No restricted ROM. Trachea midline   CV:   RRR. No m/r/g. No jugular venous distension. Lungs:   CTAB. No wheezing, rhonchi, or rales. Respirations even, unlabored  Abdomen: Bowel sounds present. Soft, nontender, nondistended. Extremities: No cyanosis or clubbing. No edema  Skin:     No rashes and normal coloration. Warm and dry. Neuro:  CN II-XII grossly intact. Sensation intact. A&Ox3  Psych:  Normal mood and flat affect.       I have reviewed ordered lab tests and independently visualized imaging below:    Recent Labs:  Recent Results (from the past 48 hour(s))   Basic Metabolic Panel w/ Reflex to MG    Collection Time: 08/12/22  4:20 AM   Result Value Ref Range    Sodium 141 136 - 145 mmol/L    Potassium 4.0 3.5 - 5.1 mmol/L    Chloride 110 (H) 98 - 107 mmol/L    CO2 24 21 - 32 mmol/L    Anion Gap 7 7 - 16 mmol/L    Glucose 124 (H) 65 - 100 mg/dL    BUN 19 8 - 23 MG/DL    Creatinine 0.90 0.6 - 1.0 MG/DL    GFR African American >60 >60 ml/min/1.73m2    GFR Non- >60 >60 ml/min/1.73m2    Calcium 8.9 8.3 - 10.4 MG/DL           Other Studies:      Current Meds:  Current Facility-Administered Medications   Medication Dose Route Frequency    atorvastatin (LIPITOR) tablet 40 mg  40 mg Oral Daily    levothyroxine (SYNTHROID) tablet 75 mcg  75 mcg Oral Daily    memantine (NAMENDA) tablet 10 mg  10 mg Oral BID    metoprolol succinate (TOPROL XL) extended release tablet 25 mg  25 mg Oral Daily    sodium chloride flush 0.9 % injection 5-40 mL  5-40 mL IntraVENous 2 times per day    sodium chloride flush 0.9 % injection 5-40 mL  5-40 mL IntraVENous PRN    0.9 % sodium chloride infusion   IntraVENous PRN    enoxaparin Sodium (LOVENOX) injection 30 mg  30 mg SubCUTAneous Q12H    ondansetron (ZOFRAN-ODT) disintegrating tablet 4 mg  4 mg Oral Q8H PRN    Or    ondansetron (ZOFRAN) injection 4 mg  4 mg IntraVENous Q6H PRN    polyethylene glycol (GLYCOLAX) packet 17 g  17 g Oral Daily PRN    acetaminophen (TYLENOL) tablet 650 mg  650 mg Oral Q6H PRN    Or    acetaminophen (TYLENOL) suppository 650 mg  650 mg Rectal Q6H PRN    tuberculin injection 5 Units  5 Units IntraDERmal Once    HYDROcodone-acetaminophen (NORCO) 5-325 MG per tablet 1 tablet  1 tablet Oral Q6H PRN       Signed:  VIJAYA Jimenez - CNP    Part of this note may have been written by using a voice dictation software. The note has been proof read but may still contain some grammatical/other typographical errors.

## 2022-08-12 NOTE — PROGRESS NOTES
OCCUPATIONAL THERAPY Initial Assessment       OT Visit Days: 1  Acknowledge Orders  Time  OT Charge Capture  Rehab Caseload Sayra Ferraro is a 67 y.o. female   PRIMARY DIAGNOSIS: Inability to walk  Inability to walk [R26.2]       Reason for Referral: Generalized Muscle Weakness (M62.81)  Inpatient: Payor: MEDICARE / Plan: MEDICARE PART A AND B / Product Type: *No Product type* /     ASSESSMENT:     REHAB RECOMMENDATIONS:   Recommendation to date pending progress:  Setting:  Short-term Rehab    Equipment:    Rolling Walker     ASSESSMENT:  Ms. Fouzia Hamilton is a 67 y F with hx of dementia, DMII, afib, hypothyroidism. Pt admitted for inability to walk due to fall onto knees and hitting head at [facility]. Pt received supine in bed alert and oriented x4. Pt likes to say no but sometimes will do what you ask. Bed mobility CGA. Static sitting EOB F+ balance. STS from EOB at [facility], attempted three times unable to clear bottom from bed Max A x2. Pt will not let you lift from under her arms, has BLE weakness, and bilateral knee pain making STS difficult. Pt adamantly refused ADLs. Sister present and states she doesn't like to do anything and never participate in ADLs. Except for toileting, ADLs will be difficult for OT to encourage her to participate in. Pt requires continued skilled OT services due to performing functionally below baseline. Pt has deficits in strength, balance, activity tolerance, and performing ADLs.       [address] [facility] [address] [address] AM-PAC 6 Clicks Daily Activity Inpatient Short Form:    AM-PAC Daily Activity Inpatient   How much help for putting on and taking off regular lower body clothing?: None  How much help for Bathing?: A Little  How much help for Toileting?: A Lot  How much help for putting on and taking off regular upper body clothing?: None  How much help for taking care of personal grooming?: None  How much help for eating meals?: None  AM-PAC Inpatient Daily Activity Raw Score: 21  AM-PAC Inpatient ADL T-Scale Score : 44.27  ADL Inpatient CMS 0-100% Score: 32.79  ADL Inpatient CMS G-Code Modifier : CJ           SUBJECTIVE:     Ms. Goldy Wiley states, \"No, I don't want to. \"     Social/Functional Lives With:  (from [facility])  Type of Home: Assisted living  Home Equipment: Walker, rolling (needs new one; current one broken)  ADL Assistance: Needs assistance  Homemaking Assistance: Needs assistance  Ambulation Assistance: Independent  Transfer Assistance: Independent    OBJECTIVE:     Alicia Jo / Chapito Koo / Rachel Sartorius: Trevor Rogers    RESTRICTIONS/PRECAUTIONS:  Restrictions/Precautions: Fall Risk    PAIN: Tilmon Harrington / O2:   Pre Treatment:   Pain Assessment: [place]ingham-Baker FACES  Pain Level: 6 (with movement)             Vitals          Oxygen            GROSS EVALUATION: INTACT IMPAIRED   (See Comments)   UE AROM WFL[]    UE PROM [] []   Strength []  Generally decreased     Posture / Balance [] Sitting - Static: Fair, +  Standing - Static: Poor (unable)   Sensation [x]     Coordination []       Tone [x]       Edema [x]    Activity Tolerance [] Patient limited by pain     Hand Dominance R [] L []      COGNITION/  PERCEPTION: INTACT IMPAIRED   (See Comments)   Orientation [x]     Vision [x]     Hearing [x]     Cognition  [] Overall Cognitive Status: Exceptions  Initiation: Requires cues for some  Cognition Comment: flat affect   Perception []       MOBILITY: I Mod I S SBA CGA Min Mod Max Total  NT x2 Comments:   Bed Mobility    Rolling [] [] [] [] [] [] [] [] [] [] []    Supine to Sit [] [] [] [] [x] [] [] [] [] [] []    Scooting [] [] [] [] [] [] [] [] [] [] []    Sit to Supine [] [] [] [] [] [] [] [] [] [] []    Transfers    Sit to Stand [] [] [] [] [] [] [] [x] [] [] [x] unable   Bed to Chair [] [] [] [] [] [] [] [] [] [] []    Stand to Sit [] [] [] [] [] [] [] [] [] [] []    Tub/Shower [] [] [] [] [] [] [] [] [] [] []     Toilet [] [] [] [] [] [] [] [] [] [] []      [] [] [] [] [] [] [] [] [] [] []    I=Independent, Mod I=Modified Independent, S=Supervision/Setup, SBA=Standby Assistance, CGA=Contact Guard Assistance, Min=Minimal Assistance, Mod=Moderate Assistance, Max=Maximal Assistance, Total=Total Assistance, NT=Not Tested    ACTIVITIES OF DAILY LIVING: I Mod I S SBA CGA Min Mod Max Total NT Comments   BASIC ADLs:              Upper Body Bathing  [] [] [] [] [] [] [] [] [] []    Lower Body Bathing [] [] [] [] [] [] [] [] [] []    Toileting [] [] [] [] [] [] [] [] [] []    Upper Body Dressing [] [] [] [] [] [] [] [] [] []    Lower Body Dressing [] [] [] [] [] [] [] [] [] []    Feeding [] [] [] [] [] [] [] [] [] []    Grooming [] [] [] [] [] [] [] [] [] []    Personal Device Care [] [] [] [] [] [] [] [] [] []    Functional Mobility [] [] [] [] [] [] [] [x] [] [] X2 STS attempt unable   I=Independent, Mod I=Modified Independent, S=Supervision/Setup, SBA=Standby Assistance, CGA=Contact Guard Assistance, Min=Minimal Assistance, Mod=Moderate Assistance, Max=Maximal Assistance, Total=Total Assistance, NT=Not Tested    PLAN:     FREQUENCY/DURATION   OT Plan of Care: 3 times/week for duration of hospital stay or until stated goals are met, whichever comes first.    ACUTE OCCUPATIONAL THERAPY GOALS:   (Developed with and agreed upon by patient and/or caregiver.)  1. Pt will tolerate standing at RW with F balance for at least 1 minute. 2. Pt will complete toileting Min A with AE as needed. 3. Pt will tolerate 25 minutes of OT treatment requiring 1-2 breaks as needed. 4. Pt will complete functional mobility via RW Mod A.   5. Pt will tolerate BUE exercises to increase strength for safe, functional transfers, ADL participation.        PROBLEM LIST:   (Skilled intervention is medically necessary to address:)  Decreased ADL/Functional Activities  Decreased Activity Tolerance  Decreased Balance  Decreased Cognition  Decreased Coordination  Decreased Gait Ability  Decreased Safety Awareness  Decreased Strength  Decreased Transfer Abilities  Increased Pain INTERVENTIONS PLANNED:  (Benefits and precautions of occupational therapy have been discussed with the patient.)  Self Care Training  Therapeutic Activity  Therapeutic Exercise/HEP  Neuromuscular Re-education  Education         TREATMENT:     EVALUATION: MODERATE COMPLEXITY: (Untimed Charge)    TREATMENT:   Co-Treatment PT/OT necessary due to patient's decreased overall endurance/tolerance levels, as well as need for high level skilled assistance to complete functional transfers/mobility and functional tasks  Neuromuscular Re-education (23 Minutes): Neuromuscular Re-education included Balance Training, Coordination training, Functional mobility with facilitation, and Sitting balance training to improve Balance, Coordination, and Functional Mobility. TREATMENT GRID:  N/A    AFTER TREATMENT PRECAUTIONS: Bed, Bed/Chair Locked, Call light within reach, Needs within reach, RN notified, Side rails x3, and Visitors at bedside    INTERDISCIPLINARY COLLABORATION:  RN/ PCT, PT/ PTA, and OT/ JAUREGUI    EDUCATION:  Education Given To: Patient; Family  Education Provided: Plan of Care;Role of Therapy; Fall Prevention Strategies  Education Method: Verbal  Barriers to Learning: Cognition  Education Outcome: Continued education needed    TOTAL TREATMENT DURATION AND TIME:  Time In: 1100  Time Out: 1130  Minutes: [facility], OT

## 2022-08-12 NOTE — H&P
Never    Smokeless tobacco: Never   Substance Use Topics    Alcohol use: No      Family History   Problem Relation Age of Onset    Diabetes Mother     Diabetes Father       Family history reviewed and noncontributory to patient's acute condition; no relevant family history unless otherwise noted above.   Immunization History   Administered Date(s) Administered    COVID-19, MODERNA BLUE border, Primary or Immunocompromised, (age 12y+), IM, 100 mcg/0.5mL 01/13/2021, 02/10/2021    Influenza Virus Vaccine 09/26/2007, 09/26/2011, 09/10/2020    Influenza, Hilda Carvalhoon, IM, PF (6 mo and older Fluzone, Flulaval, Fluarix, and 3 yrs and older Afluria) 10/11/2018, 10/29/2019    Pneumococcal Conjugate 13-valent ([id number]) 01/23/2019    Pneumococcal Polysaccharide (Wwomecczp04) 02/12/2015    Tdap (Boostrix, Adacel) 02/12/2015    Zoster Recombinant (Shingrix) 06/23/2019, 09/24/2019     PTA Medications:  Current Outpatient Medications   Medication Instructions    acetaminophen (TYLENOL) 650 mg, Oral, EVERY 4 HOURS PRN    ascorbic acid (VITAMIN C) 250 mg, Oral, DAILY    atorvastatin (LIPITOR) 40 MG tablet TAKE 1 TABLET DAILY    FreeStyle Lancets MISC USE TO CHECK BLOOD GLUCOSE ONCE DAILY    FREESTYLE LITE strip USE TO CHECK BLOOD GLUCOSE ONCE DAILY    glyBURIDE-metFORMIN (GLUCOVANCE) 2.5-500 MG per tablet TAKE 2 TABLETS TWICE A DAY WITH MEALS    levothyroxine (SYNTHROID) 75 MCG tablet TAKE 1 TABLET DAILY BEFORE BREAKFAST    memantine (NAMENDA) 10 MG tablet TAKE 1 TABLET TWICE A DAY    metoprolol succinate (TOPROL XL) 25 mg, Oral, DAILY    nystatin (MYCOSTATIN) 427595 UNIT/GM powder Apply powder to skin folds between breasts and abdomen 4 times daily    SITagliptin (JANUVIA) 100 mg, Oral, DAILY       Objective:   Patient Vitals for the past 24 hrs:   Temp Pulse Resp BP SpO2   08/12/22 0214 97.6 °F (36.4 °C) 100 16 132/65 94 %       Estimated body mass index is 44.72 kg/m² as calculated from the following:    Height as of 9/15/21: 5' 7.52\" (1.715 m). Weight as of 8/11/22: 290 lb (131.5 kg). No intake or output data in the 24 hours ending 08/12/22 0326      Physical Exam:  General:    Well nourished. No overt distress  Head:  Normocephalic, atraumatic  Eyes:  Sclerae appear normal.  Pupils equally round. HENT:  Nares appear normal, no drainage. Moist mucous membranes  Neck:  No restricted ROM. Trachea midline  CV:   RRR. S1/S2 auscultated  Lungs:   CTAB. No wheezing, rhonchi, or rales. Appears even, unlabored  Abdomen: Bowel sounds present. Soft, nontender, nondistended. Extremities: Warm and dry. No cyanosis or clubbing. No edema. Skin:     No rashes. Normal turgor. Normal coloration  Neuro:  Cranial nerves II-XII grossly intact. Sensation intact  Psych:  Normal mood and affect.   Alert and oriented x2      Signed:  Sofie Jay MD

## 2022-08-12 NOTE — CARE COORDINATION
Chart screened by  for potential discharge needs or concerns. PT/OT evals complete with the recommendation for STR at NH. CM will follow up with pt/family as soon as possible to discuss these recommendations and proceed with referrals. Pt is from an group home and can receive therapy there if needed. Please notify/consult  if other discharge needs arise. 08/12/22 2517   Service Assessment   History Provided By Medical Record   Support Systems Family Members; Other (Comment)  ([facility] staff)   PCP Verified by CM Yes   Last Visit to PCP Within last 6 months   Can patient return to prior living arrangement Unknown at present   Ability to make needs known: Fair   Family able to assist with home care needs: No   Social/Functional History   Lives With Other (comment)  ([facility])   Type of Home Assisted living   [address] [facility], [place]  (needs new one; current one broken)   [place] [address] Help From Other (comment)  (group home staff)   ADL Assistance Needs assistance   [address] Needs assistance   Ambulation Assistance Independent   Transfer Assistance Independent   Active  No   Occupation Retired   Discharge Planning   Type of [address] Other (Comment)  ([facility])   Current Services Prior To Admission None   [address]   DME Ordered? No   Potential Assistance Purchasing Medications No   Type of Home Care Services None   Patient expects to be discharged to: Unknown   History of falls? 1   Services At/After Discharge   Transition of Care Consult (CM Consult)   (no consult received)   [place] Provided?  No   Mode of Transport at Discharge BLS   Confirm Follow Up Transport Family

## 2022-08-12 NOTE — PROGRESS NOTES
's visit requested by staff due to \"emotional distress. \" I conveyed care and concern and explored needs. Ms. Abilio Johnson and her sister denied having emotional or spiritual needs and both appeared surprised that a  was consulted. I responded that staff requested the visit. Chaplains remain available if desired. Ms. Abilio Johnson is listed a [employer] and she is on the list to receive [employer] communion.      Stew Adam 67  Board Certified

## 2022-08-13 LAB
ANION GAP SERPL CALC-SCNC: 4 MMOL/L (ref 7–16)
BASOPHILS # BLD: 0.1 K/UL (ref 0–0.2)
BASOPHILS NFR BLD: 1 % (ref 0–2)
BUN SERPL-MCNC: 18 MG/DL (ref 8–23)
CALCIUM SERPL-MCNC: 8.8 MG/DL (ref 8.3–10.4)
CHLORIDE SERPL-SCNC: 111 MMOL/L (ref 98–107)
CO2 SERPL-SCNC: 23 MMOL/L (ref 21–32)
CREAT SERPL-MCNC: 0.7 MG/DL (ref 0.6–1)
DIFFERENTIAL METHOD BLD: NORMAL
EOSINOPHIL # BLD: 0.3 K/UL (ref 0–0.8)
EOSINOPHIL NFR BLD: 3 % (ref 0.5–7.8)
ERYTHROCYTE [DISTWIDTH] IN BLOOD BY AUTOMATED COUNT: 13.8 % (ref 11.9–14.6)
GLUCOSE SERPL-MCNC: 124 MG/DL (ref 65–100)
HCT VFR BLD AUTO: 44.6 % (ref 35.8–46.3)
HGB BLD-MCNC: 14.2 G/DL (ref 11.7–15.4)
IMM GRANULOCYTES # BLD AUTO: 0.1 K/UL (ref 0–0.5)
IMM GRANULOCYTES NFR BLD AUTO: 1 % (ref 0–5)
LYMPHOCYTES # BLD: 2.5 K/UL (ref 0.5–4.6)
LYMPHOCYTES NFR BLD: 26 % (ref 13–44)
MCH RBC QN AUTO: 28.1 PG (ref 26.1–32.9)
MCHC RBC AUTO-ENTMCNC: 31.8 G/DL (ref 31.4–35)
MCV RBC AUTO: 88.1 FL (ref 79.6–97.8)
MONOCYTES # BLD: 0.7 K/UL (ref 0.1–1.3)
MONOCYTES NFR BLD: 8 % (ref 4–12)
NEUTS SEG # BLD: 6.1 K/UL (ref 1.7–8.2)
NEUTS SEG NFR BLD: 61 % (ref 43–78)
NRBC # BLD: 0 K/UL (ref 0–0.2)
PLATELET # BLD AUTO: 252 K/UL (ref 150–450)
PMV BLD AUTO: 10 FL (ref 9.4–12.3)
POTASSIUM SERPL-SCNC: 3.8 MMOL/L (ref 3.5–5.1)
RBC # BLD AUTO: 5.06 M/UL (ref 4.05–5.2)
SODIUM SERPL-SCNC: 138 MMOL/L (ref 136–145)
WBC # BLD AUTO: 9.7 K/UL (ref 4.3–11.1)

## 2022-08-13 PROCEDURE — 6360000002 HC RX W HCPCS: Performed by: FAMILY MEDICINE

## 2022-08-13 PROCEDURE — 6370000000 HC RX 637 (ALT 250 FOR IP): Performed by: FAMILY MEDICINE

## 2022-08-13 PROCEDURE — 1100000000 HC RM PRIVATE

## 2022-08-13 PROCEDURE — 2580000003 HC RX 258: Performed by: FAMILY MEDICINE

## 2022-08-13 PROCEDURE — 80048 BASIC METABOLIC PNL TOTAL CA: CPT

## 2022-08-13 PROCEDURE — 85025 COMPLETE CBC W/AUTO DIFF WBC: CPT

## 2022-08-13 PROCEDURE — 36415 COLL VENOUS BLD VENIPUNCTURE: CPT

## 2022-08-13 RX ADMIN — ENOXAPARIN SODIUM 30 MG: 100 INJECTION SUBCUTANEOUS at 21:16

## 2022-08-13 RX ADMIN — MEMANTINE HYDROCHLORIDE 10 MG: 5 TABLET, FILM COATED ORAL at 10:32

## 2022-08-13 RX ADMIN — LEVOTHYROXINE SODIUM 75 MCG: 0.07 TABLET ORAL at 05:23

## 2022-08-13 RX ADMIN — ENOXAPARIN SODIUM 30 MG: 100 INJECTION SUBCUTANEOUS at 10:32

## 2022-08-13 RX ADMIN — ATORVASTATIN CALCIUM 40 MG: 40 TABLET, FILM COATED ORAL at 10:32

## 2022-08-13 RX ADMIN — MEMANTINE HYDROCHLORIDE 10 MG: 5 TABLET, FILM COATED ORAL at 21:16

## 2022-08-13 RX ADMIN — SODIUM CHLORIDE, PRESERVATIVE FREE 10 ML: 5 INJECTION INTRAVENOUS at 21:20

## 2022-08-13 RX ADMIN — METOPROLOL SUCCINATE 25 MG: 25 TABLET, EXTENDED RELEASE ORAL at 10:32

## 2022-08-13 NOTE — PLAN OF CARE
Problem: Discharge Planning  Goal: Discharge to home or other facility with appropriate resources  8/12/2022 2204 by Manuel Burr RN  Outcome: Progressing  8/12/2022 0920 by Teja Pacheco RN  Outcome: Progressing  Flowsheets (Taken 8/12/2022 1859)  Discharge to home or other facility with appropriate resources: Identify barriers to discharge with patient and caregiver     Problem: Pain  Goal: Verbalizes/displays adequate comfort level or baseline comfort level  8/12/2022 2204 by Manuel Burr RN  Outcome: Progressing  8/12/2022 0920 by Teja Pacheco RN  Outcome: Progressing     Problem: Skin/Tissue Integrity  Goal: Absence of new skin breakdown  Description: 1. Monitor for areas of redness and/or skin breakdown  2. Assess vascular access sites hourly  3. Every 4-6 hours minimum:  Change oxygen saturation probe site  4. Every 4-6 hours:  If on nasal continuous positive airway pressure, respiratory therapy assess nares and determine need for appliance change or resting period.   8/12/2022 2204 by Manuel Burr RN  Outcome: Progressing  8/12/2022 0920 by Teja Pacheco RN  Outcome: Progressing     Problem: Safety - Adult  Goal: Free from fall injury  8/12/2022 2204 by Manuel Burr RN  Outcome: Progressing  8/12/2022 0920 by Teja Pacheco RN  Outcome: Progressing  Flowsheets (Taken 8/12/2022 0823)  Free From Fall Injury: Instruct family/caregiver on patient safety     Problem: ABCDS Injury Assessment  Goal: Absence of physical injury  8/12/2022 2204 by Manuel Burr RN  Outcome: Progressing  8/12/2022 0920 by Teja Pacheco RN  Outcome: Progressing  Flowsheets (Taken 8/12/2022 0318)  Absence of Physical Injury: Implement safety measures based on patient assessment

## 2022-08-13 NOTE — PROGRESS NOTES
(36.6 °C) (!) 103 16 (!) 141/78 90 %   08/12/22 2321 97.7 °F (36.5 °C) 89 16 132/87 92 %   08/12/22 1924 98.2 °F (36.8 °C) 97 18 (!) 142/78 91 %   08/12/22 1445 98 °F (36.7 °C) 88 18 (!) 138/93 96 %         Estimated body mass index is 44.72 kg/m² as calculated from the following:    Height as of 9/15/21: 5' 7.52\" (1.715 m). Weight as of 8/11/22: 290 lb (131.5 kg). Intake/Output Summary (Last 24 hours) at 8/13/2022 1335  Last data filed at 8/13/2022 0606  Gross per 24 hour   Intake --   Output 400 ml   Net -400 ml         Physical Exam:     Blood pressure 118/75, pulse 77, temperature 97.8 °F (36.6 °C), temperature source Oral, resp. rate 20, SpO2 95 %. General:    No overt distress  Head:  Normocephalic, atraumatic  Eyes:  Sclerae appear normal.  Pupils equally round. ENT:  Nares appear normal, no drainage. Moist oral mucosa  Neck:  No restricted ROM. Trachea midline   CV:   RRR. No m/r/g. No jugular venous distension. Lungs:   CTAB. No wheezing, rhonchi, or rales. Respirations even, unlabored  Abdomen: Bowel sounds present. Soft, nontender, nondistended. Extremities: No cyanosis or clubbing. No edema  Skin:     No rashes and normal coloration. Warm and dry. Neuro:  CN II-XII grossly intact. Sensation intact. A&Ox3  Psych:  Normal mood and flat affect.       I have reviewed ordered lab tests and independently visualized imaging below:    Recent Labs:  Recent Results (from the past 48 hour(s))   Basic Metabolic Panel w/ Reflex to MG    Collection Time: 08/12/22  4:20 AM   Result Value Ref Range    Sodium 141 136 - 145 mmol/L    Potassium 4.0 3.5 - 5.1 mmol/L    Chloride 110 (H) 98 - 107 mmol/L    CO2 24 21 - 32 mmol/L    Anion Gap 7 7 - 16 mmol/L    Glucose 124 (H) 65 - 100 mg/dL    BUN 19 8 - 23 MG/DL    Creatinine 0.90 0.6 - 1.0 MG/DL    GFR African American >60 >60 ml/min/1.73m2    GFR Non- >60 >60 ml/min/1.73m2    Calcium 8.9 8.3 - 10.4 MG/DL   Basic Metabolic Panel w/ Reflex to MG    Collection Time: 08/13/22  4:34 AM   Result Value Ref Range    Sodium 138 136 - 145 mmol/L    Potassium 3.8 3.5 - 5.1 mmol/L    Chloride 111 (H) 98 - 107 mmol/L    CO2 23 21 - 32 mmol/L    Anion Gap 4 (L) 7 - 16 mmol/L    Glucose 124 (H) 65 - 100 mg/dL    BUN 18 8 - 23 MG/DL    Creatinine 0.70 0.6 - 1.0 MG/DL    GFR African American >60 >60 ml/min/1.73m2    GFR Non- >60 >60 ml/min/1.73m2    Calcium 8.8 8.3 - 10.4 MG/DL   CBC with Auto Differential    Collection Time: 08/13/22  4:34 AM   Result Value Ref Range    WBC 9.7 4.3 - 11.1 K/uL    RBC 5.06 4.05 - 5.2 M/uL    Hemoglobin 14.2 11.7 - 15.4 g/dL    Hematocrit 44.6 35.8 - 46.3 %    MCV 88.1 79.6 - 97.8 FL    MCH 28.1 26.1 - 32.9 PG    MCHC 31.8 31.4 - 35.0 g/dL    RDW 13.8 11.9 - 14.6 %    Platelets 298 859 - 214 K/uL    MPV 10.0 9.4 - 12.3 FL    nRBC 0.00 0.0 - 0.2 K/uL    Differential Type AUTOMATED      Seg Neutrophils 61 43 - 78 %    Lymphocytes 26 13 - 44 %    Monocytes 8 4.0 - 12.0 %    Eosinophils % 3 0.5 - 7.8 %    Basophils 1 0.0 - 2.0 %    Immature Granulocytes 1 0.0 - 5.0 %    Segs Absolute 6.1 1.7 - 8.2 K/UL    Absolute Lymph # 2.5 0.5 - 4.6 K/UL    Absolute Mono # 0.7 0.1 - 1.3 K/UL    Absolute Eos # 0.3 0.0 - 0.8 K/UL    Basophils Absolute 0.1 0.0 - 0.2 K/UL    Absolute Immature Granulocyte 0.1 0.0 - 0.5 K/UL           Other Studies:      Current Meds:  Current Facility-Administered Medications   Medication Dose Route Frequency    atorvastatin (LIPITOR) tablet 40 mg  40 mg Oral Daily    levothyroxine (SYNTHROID) tablet 75 mcg  75 mcg Oral Daily    memantine (NAMENDA) tablet 10 mg  10 mg Oral BID    metoprolol succinate (TOPROL XL) extended release tablet 25 mg  25 mg Oral Daily    sodium chloride flush 0.9 % injection 5-40 mL  5-40 mL IntraVENous 2 times per day    sodium chloride flush 0.9 % injection 5-40 mL  5-40 mL IntraVENous PRN    0.9 % sodium chloride infusion   IntraVENous PRN    enoxaparin Sodium (LOVENOX) injection 30 mg  30 mg SubCUTAneous Q12H    ondansetron (ZOFRAN-ODT) disintegrating tablet 4 mg  4 mg Oral Q8H PRN    Or    ondansetron (ZOFRAN) injection 4 mg  4 mg IntraVENous Q6H PRN    polyethylene glycol (GLYCOLAX) packet 17 g  17 g Oral Daily PRN    acetaminophen (TYLENOL) tablet 650 mg  650 mg Oral Q6H PRN    Or    acetaminophen (TYLENOL) suppository 650 mg  650 mg Rectal Q6H PRN    HYDROcodone-acetaminophen (NORCO) 5-325 MG per tablet 1 tablet  1 tablet Oral Q6H PRN       Signed:  VIJAYA Ewing - CNP    Part of this note may have been written by using a voice dictation software. The note has been proof read but may still contain some grammatical/other typographical errors.

## 2022-08-14 LAB
ANION GAP SERPL CALC-SCNC: 5 MMOL/L (ref 7–16)
BASOPHILS # BLD: 0.1 K/UL (ref 0–0.2)
BASOPHILS NFR BLD: 1 % (ref 0–2)
BUN SERPL-MCNC: 17 MG/DL (ref 8–23)
CALCIUM SERPL-MCNC: 9.1 MG/DL (ref 8.3–10.4)
CHLORIDE SERPL-SCNC: 108 MMOL/L (ref 98–107)
CO2 SERPL-SCNC: 25 MMOL/L (ref 21–32)
CREAT SERPL-MCNC: 0.8 MG/DL (ref 0.6–1)
DIFFERENTIAL METHOD BLD: ABNORMAL
EOSINOPHIL # BLD: 0.3 K/UL (ref 0–0.8)
EOSINOPHIL NFR BLD: 3 % (ref 0.5–7.8)
ERYTHROCYTE [DISTWIDTH] IN BLOOD BY AUTOMATED COUNT: 13.9 % (ref 11.9–14.6)
GLUCOSE SERPL-MCNC: 151 MG/DL (ref 65–100)
HCT VFR BLD AUTO: 46.3 % (ref 35.8–46.3)
HGB BLD-MCNC: 14.7 G/DL (ref 11.7–15.4)
IMM GRANULOCYTES # BLD AUTO: 0.1 K/UL (ref 0–0.5)
IMM GRANULOCYTES NFR BLD AUTO: 1 % (ref 0–5)
LYMPHOCYTES # BLD: 2.6 K/UL (ref 0.5–4.6)
LYMPHOCYTES NFR BLD: 28 % (ref 13–44)
MCH RBC QN AUTO: 28.1 PG (ref 26.1–32.9)
MCHC RBC AUTO-ENTMCNC: 31.7 G/DL (ref 31.4–35)
MCV RBC AUTO: 88.4 FL (ref 79.6–97.8)
MM INDURATION, POC: 0 MM (ref 0–5)
MONOCYTES # BLD: 0.7 K/UL (ref 0.1–1.3)
MONOCYTES NFR BLD: 8 % (ref 4–12)
NEUTS SEG # BLD: 5.5 K/UL (ref 1.7–8.2)
NEUTS SEG NFR BLD: 59 % (ref 43–78)
NRBC # BLD: 0 K/UL (ref 0–0.2)
PLATELET # BLD AUTO: 289 K/UL (ref 150–450)
PMV BLD AUTO: 10 FL (ref 9.4–12.3)
POTASSIUM SERPL-SCNC: 4 MMOL/L (ref 3.5–5.1)
PPD, POC: NEGATIVE
RBC # BLD AUTO: 5.24 M/UL (ref 4.05–5.2)
SODIUM SERPL-SCNC: 138 MMOL/L (ref 136–145)
WBC # BLD AUTO: 9.3 K/UL (ref 4.3–11.1)

## 2022-08-14 PROCEDURE — 6370000000 HC RX 637 (ALT 250 FOR IP): Performed by: FAMILY MEDICINE

## 2022-08-14 PROCEDURE — 6360000002 HC RX W HCPCS: Performed by: FAMILY MEDICINE

## 2022-08-14 PROCEDURE — 2580000003 HC RX 258: Performed by: FAMILY MEDICINE

## 2022-08-14 PROCEDURE — 1100000000 HC RM PRIVATE

## 2022-08-14 PROCEDURE — 36415 COLL VENOUS BLD VENIPUNCTURE: CPT

## 2022-08-14 PROCEDURE — 85025 COMPLETE CBC W/AUTO DIFF WBC: CPT

## 2022-08-14 PROCEDURE — 94760 N-INVAS EAR/PLS OXIMETRY 1: CPT

## 2022-08-14 PROCEDURE — 80048 BASIC METABOLIC PNL TOTAL CA: CPT

## 2022-08-14 RX ADMIN — ENOXAPARIN SODIUM 30 MG: 100 INJECTION SUBCUTANEOUS at 08:15

## 2022-08-14 RX ADMIN — MEMANTINE HYDROCHLORIDE 10 MG: 5 TABLET, FILM COATED ORAL at 08:15

## 2022-08-14 RX ADMIN — ENOXAPARIN SODIUM 30 MG: 100 INJECTION SUBCUTANEOUS at 20:33

## 2022-08-14 RX ADMIN — MEMANTINE HYDROCHLORIDE 10 MG: 5 TABLET, FILM COATED ORAL at 20:33

## 2022-08-14 RX ADMIN — ATORVASTATIN CALCIUM 40 MG: 40 TABLET, FILM COATED ORAL at 08:15

## 2022-08-14 RX ADMIN — METOPROLOL SUCCINATE 25 MG: 25 TABLET, EXTENDED RELEASE ORAL at 08:15

## 2022-08-14 RX ADMIN — SODIUM CHLORIDE, PRESERVATIVE FREE 10 ML: 5 INJECTION INTRAVENOUS at 20:33

## 2022-08-14 RX ADMIN — LEVOTHYROXINE SODIUM 75 MCG: 0.07 TABLET ORAL at 06:20

## 2022-08-14 ASSESSMENT — PAIN SCALES - GENERAL
PAINLEVEL_OUTOF10: 0

## 2022-08-14 NOTE — PROGRESS NOTES
(36.5 °C) 86 16 122/79 93 %   08/13/22 2029 98.2 °F (36.8 °C) 88 18 134/86 92 %   08/13/22 1557 98.4 °F (36.9 °C) 92 20 118/78 95 %   08/13/22 1200 97.8 °F (36.6 °C) 77 20 118/75 95 %         Estimated body mass index is 44.72 kg/m² as calculated from the following:    Height as of 9/15/21: 5' 7.52\" (1.715 m). Weight as of 8/11/22: 290 lb (131.5 kg). Intake/Output Summary (Last 24 hours) at 8/14/2022 1011  Last data filed at 8/14/2022 0832  Gross per 24 hour   Intake 400 ml   Output 500 ml   Net -100 ml         Physical Exam:     Blood pressure (!) 151/89, pulse 87, temperature 97.5 °F (36.4 °C), temperature source Oral, resp. rate 18, SpO2 94 %. General:    No overt distress  Head:  Normocephalic, atraumatic  Eyes:  Sclerae appear normal.  Pupils equally round. ENT:  Nares appear normal, no drainage. Moist oral mucosa  Neck:  No restricted ROM. Trachea midline   CV:   RRR. No m/r/g. No jugular venous distension. Lungs:   CTAB. No wheezing, rhonchi, or rales. Respirations even, unlabored  Abdomen: Bowel sounds present. Soft, nontender, nondistended. Extremities: No cyanosis or clubbing. No edema  Skin:     No rashes and normal coloration. Warm and dry. Neuro:  CN II-XII grossly intact. Sensation intact. A&Ox3  Psych:  Normal mood and flat affect.       I have reviewed ordered lab tests and independently visualized imaging below:    Recent Labs:  Recent Results (from the past 48 hour(s))   Basic Metabolic Panel w/ Reflex to MG    Collection Time: 08/13/22  4:34 AM   Result Value Ref Range    Sodium 138 136 - 145 mmol/L    Potassium 3.8 3.5 - 5.1 mmol/L    Chloride 111 (H) 98 - 107 mmol/L    CO2 23 21 - 32 mmol/L    Anion Gap 4 (L) 7 - 16 mmol/L    Glucose 124 (H) 65 - 100 mg/dL    BUN 18 8 - 23 MG/DL    Creatinine 0.70 0.6 - 1.0 MG/DL    GFR African American >60 >60 ml/min/1.73m2    GFR Non- >60 >60 ml/min/1.73m2    Calcium 8.8 8.3 - 10.4 MG/DL   CBC with Auto Differential Collection Time: 08/13/22  4:34 AM   Result Value Ref Range    WBC 9.7 4.3 - 11.1 K/uL    RBC 5.06 4.05 - 5.2 M/uL    Hemoglobin 14.2 11.7 - 15.4 g/dL    Hematocrit 44.6 35.8 - 46.3 %    MCV 88.1 79.6 - 97.8 FL    MCH 28.1 26.1 - 32.9 PG    MCHC 31.8 31.4 - 35.0 g/dL    RDW 13.8 11.9 - 14.6 %    Platelets 371 345 - 742 K/uL    MPV 10.0 9.4 - 12.3 FL    nRBC 0.00 0.0 - 0.2 K/uL    Differential Type AUTOMATED      Seg Neutrophils 61 43 - 78 %    Lymphocytes 26 13 - 44 %    Monocytes 8 4.0 - 12.0 %    Eosinophils % 3 0.5 - 7.8 %    Basophils 1 0.0 - 2.0 %    Immature Granulocytes 1 0.0 - 5.0 %    Segs Absolute 6.1 1.7 - 8.2 K/UL    Absolute Lymph # 2.5 0.5 - 4.6 K/UL    Absolute Mono # 0.7 0.1 - 1.3 K/UL    Absolute Eos # 0.3 0.0 - 0.8 K/UL    Basophils Absolute 0.1 0.0 - 0.2 K/UL    Absolute Immature Granulocyte 0.1 0.0 - 0.5 K/UL   Basic Metabolic Panel w/ Reflex to MG    Collection Time: 08/14/22  3:37 AM   Result Value Ref Range    Sodium 138 136 - 145 mmol/L    Potassium 4.0 3.5 - 5.1 mmol/L    Chloride 108 (H) 98 - 107 mmol/L    CO2 25 21 - 32 mmol/L    Anion Gap 5 (L) 7 - 16 mmol/L    Glucose 151 (H) 65 - 100 mg/dL    BUN 17 8 - 23 MG/DL    Creatinine 0.80 0.6 - 1.0 MG/DL    GFR African American >60 >60 ml/min/1.73m2    GFR Non- >60 >60 ml/min/1.73m2    Calcium 9.1 8.3 - 10.4 MG/DL   CBC with Auto Differential    Collection Time: 08/14/22  3:37 AM   Result Value Ref Range    WBC 9.3 4.3 - 11.1 K/uL    RBC 5.24 (H) 4.05 - 5.2 M/uL    Hemoglobin 14.7 11.7 - 15.4 g/dL    Hematocrit 46.3 35.8 - 46.3 %    MCV 88.4 79.6 - 97.8 FL    MCH 28.1 26.1 - 32.9 PG    MCHC 31.7 31.4 - 35.0 g/dL    RDW 13.9 11.9 - 14.6 %    Platelets 945 726 - 678 K/uL    MPV 10.0 9.4 - 12.3 FL    nRBC 0.00 0.0 - 0.2 K/uL    Differential Type AUTOMATED      Seg Neutrophils 59 43 - 78 %    Lymphocytes 28 13 - 44 %    Monocytes 8 4.0 - 12.0 %    Eosinophils % 3 0.5 - 7.8 %    Basophils 1 0.0 - 2.0 %    Immature Granulocytes 1 0.0 - 5.0 %    Segs Absolute 5.5 1.7 - 8.2 K/UL    Absolute Lymph # 2.6 0.5 - 4.6 K/UL    Absolute Mono # 0.7 0.1 - 1.3 K/UL    Absolute Eos # 0.3 0.0 - 0.8 K/UL    Basophils Absolute 0.1 0.0 - 0.2 K/UL    Absolute Immature Granulocyte 0.1 0.0 - 0.5 K/UL   PLEASE READ & DOCUMENT PPD TEST IN 48 HRS    Collection Time: 08/14/22  8:17 AM   Result Value Ref Range    PPD, (POC) Negative Negative    mm Induration 0 0 - 5 mm           Other Studies:      Current Meds:  Current Facility-Administered Medications   Medication Dose Route Frequency    atorvastatin (LIPITOR) tablet 40 mg  40 mg Oral Daily    levothyroxine (SYNTHROID) tablet 75 mcg  75 mcg Oral Daily    memantine (NAMENDA) tablet 10 mg  10 mg Oral BID    metoprolol succinate (TOPROL XL) extended release tablet 25 mg  25 mg Oral Daily    sodium chloride flush 0.9 % injection 5-40 mL  5-40 mL IntraVENous 2 times per day    sodium chloride flush 0.9 % injection 5-40 mL  5-40 mL IntraVENous PRN    0.9 % sodium chloride infusion   IntraVENous PRN    enoxaparin Sodium (LOVENOX) injection 30 mg  30 mg SubCUTAneous Q12H    ondansetron (ZOFRAN-ODT) disintegrating tablet 4 mg  4 mg Oral Q8H PRN    Or    ondansetron (ZOFRAN) injection 4 mg  4 mg IntraVENous Q6H PRN    polyethylene glycol (GLYCOLAX) packet 17 g  17 g Oral Daily PRN    acetaminophen (TYLENOL) tablet 650 mg  650 mg Oral Q6H PRN    Or    acetaminophen (TYLENOL) suppository 650 mg  650 mg Rectal Q6H PRN    HYDROcodone-acetaminophen (NORCO) 5-325 MG per tablet 1 tablet  1 tablet Oral Q6H PRN       Signed:  Ector Arnett, APRN - CNP    Part of this note may have been written by using a voice dictation software. The note has been proof read but may still contain some grammatical/other typographical errors.

## 2022-08-15 LAB
MM INDURATION, POC: 0 MM (ref 0–5)
PPD, POC: NEGATIVE

## 2022-08-15 PROCEDURE — 6360000002 HC RX W HCPCS: Performed by: FAMILY MEDICINE

## 2022-08-15 PROCEDURE — 2580000003 HC RX 258: Performed by: FAMILY MEDICINE

## 2022-08-15 PROCEDURE — 1100000000 HC RM PRIVATE

## 2022-08-15 PROCEDURE — 6370000000 HC RX 637 (ALT 250 FOR IP): Performed by: FAMILY MEDICINE

## 2022-08-15 PROCEDURE — 97530 THERAPEUTIC ACTIVITIES: CPT

## 2022-08-15 RX ADMIN — ENOXAPARIN SODIUM 30 MG: 100 INJECTION SUBCUTANEOUS at 20:39

## 2022-08-15 RX ADMIN — METOPROLOL SUCCINATE 25 MG: 25 TABLET, EXTENDED RELEASE ORAL at 08:16

## 2022-08-15 RX ADMIN — SODIUM CHLORIDE, PRESERVATIVE FREE 10 ML: 5 INJECTION INTRAVENOUS at 08:17

## 2022-08-15 RX ADMIN — SODIUM CHLORIDE, PRESERVATIVE FREE 10 ML: 5 INJECTION INTRAVENOUS at 20:40

## 2022-08-15 RX ADMIN — ENOXAPARIN SODIUM 30 MG: 100 INJECTION SUBCUTANEOUS at 08:16

## 2022-08-15 RX ADMIN — LEVOTHYROXINE SODIUM 75 MCG: 0.07 TABLET ORAL at 05:21

## 2022-08-15 RX ADMIN — ATORVASTATIN CALCIUM 40 MG: 40 TABLET, FILM COATED ORAL at 08:16

## 2022-08-15 RX ADMIN — MEMANTINE HYDROCHLORIDE 10 MG: 5 TABLET, FILM COATED ORAL at 08:16

## 2022-08-15 RX ADMIN — MEMANTINE HYDROCHLORIDE 10 MG: 5 TABLET, FILM COATED ORAL at 20:39

## 2022-08-15 ASSESSMENT — PAIN SCALES - GENERAL
PAINLEVEL_OUTOF10: 0
PAINLEVEL_OUTOF10: 0

## 2022-08-15 NOTE — PROGRESS NOTES
Hospitalist Progress Note   Admit Date:  2022  2:11 AM   Name:  Paloma Damon   Age:  67 y.o. Sex:  female  :  1950   MRN:  [id number]   Room:  [facility]/    Presenting Complaint: No chief complaint on file. Reason(s) for Admission: Inability to walk [R26.2]     Hospital Course & Interval History:   Paloma Damon is a 67 y.o. female with medical history of DM2, afib, hypothyroidism, dementia who presented to ED after a fall at [facility]. She has been unable to ambulate since the fall. At baseline she walks with a walker. In the ER  XR negative for fracture or dislocation. CT pelvis without any evidence of pelvic or hip fracture. There is mention of a \"large soft tissue pelvic mass\" known to patient and daughter. Due to [facility] not being able to take patient back if she cannot ambulate, hospitalist consulted for admission. Subjective/24hr Events (08/15/22):  Encouraged patient to get OOB. Reports minimal pain in left knee today. AROM/PROM will be beneficial     ROS:  10 systems reviewed and negative except as noted above. Assessment & Plan:   Inability to Ambulate  22  - No evidence of fractures on imaging. Patient reports knee pain after fall makes it difficult to ambulate  - PT/OT with recs for STR  - Prn analgesia  - PPD ordered  8/15/22  -CM continues working on [employer] referrals; appreciate assistance      DM2  - Accu checks with SSI  - A1c 6.3 3/14/22  8/13/22  -BGL in acceptable range      Afib  - Rate controlled  - Home meds     Hypothyroidism  - Continue home synthroid     Uterine Mass  - Known  - Follows with her own physician for this     Dementia  - Home meds        Discharge Planning:      Dispo pending rehab placement    Diet:  ADULT DIET;  Regular  DVT PPx: Lovenox SQ  Code status: FULL CODE        Objective:   Patient Vitals for the past 24 hrs:   Temp Pulse Resp BP SpO2   08/15/22 1112 97.2 °F (36.2 °C) 55 18 136/89 94 %   08/15/22 0733 97.3 °F (36.3 °C) 94 18 (!) 129/90 92 %   08/15/22 0454 98.2 °F (36.8 °C) (!) 105 20 125/87 94 %   08/15/22 0009 97.7 °F (36.5 °C) 92 18 113/71 91 %   08/14/22 1950 98.2 °F (36.8 °C) 84 20 107/65 93 %   08/14/22 1511 98.2 °F (36.8 °C) 80 18 120/81 93 %   08/14/22 1204 98.8 °F (37.1 °C) 79 18 103/67 91 %         Estimated body mass index is 44.72 kg/m² as calculated from the following:    Height as of 9/15/21: 5' 7.52\" (1.715 m). Weight as of 8/11/22: 290 lb (131.5 kg). Intake/Output Summary (Last 24 hours) at 8/15/2022 1135  Last data filed at 8/15/2022 0923  Gross per 24 hour   Intake 500 ml   Output --   Net 500 ml         Physical Exam:     Blood pressure 136/89, pulse 55, temperature 97.2 °F (36.2 °C), temperature source Oral, resp. rate 18, SpO2 94 %. General:    No overt distress  Head:  Normocephalic, atraumatic  Eyes:  Sclerae appear normal.  Pupils equally round. ENT:  Nares appear normal, no drainage. Moist oral mucosa  Neck:  No restricted ROM. Trachea midline   CV:   RRR. No m/r/g. No jugular venous distension. Lungs:   CTAB. No wheezing, rhonchi, or rales. Respirations even, unlabored  Abdomen: Bowel sounds present. Soft, nontender, nondistended. Extremities: No cyanosis or clubbing. No edema  Skin:     No rashes and normal coloration. Warm and dry. Neuro:  CN II-XII grossly intact. Sensation intact. A&Ox3  Psych:  Normal mood and flat affect.       I have reviewed ordered lab tests and independently visualized imaging below:    Recent Labs:  Recent Results (from the past 48 hour(s))   Basic Metabolic Panel w/ Reflex to MG    Collection Time: 08/14/22  3:37 AM   Result Value Ref Range    Sodium 138 136 - 145 mmol/L    Potassium 4.0 3.5 - 5.1 mmol/L    Chloride 108 (H) 98 - 107 mmol/L    CO2 25 21 - 32 mmol/L    Anion Gap 5 (L) 7 - 16 mmol/L    Glucose 151 (H) 65 - 100 mg/dL    BUN 17 8 - 23 MG/DL    Creatinine 0.80 0.6 - 1.0 MG/DL    GFR African American >60 >60 ml/min/1.73m2    GFR Non-African American >60 >60 ml/min/1.73m2    Calcium 9.1 8.3 - 10.4 MG/DL   CBC with Auto Differential    Collection Time: 08/14/22  3:37 AM   Result Value Ref Range    WBC 9.3 4.3 - 11.1 K/uL    RBC 5.24 (H) 4.05 - 5.2 M/uL    Hemoglobin 14.7 11.7 - 15.4 g/dL    Hematocrit 46.3 35.8 - 46.3 %    MCV 88.4 79.6 - 97.8 FL    MCH 28.1 26.1 - 32.9 PG    MCHC 31.7 31.4 - 35.0 g/dL    RDW 13.9 11.9 - 14.6 %    Platelets 292 194 - 047 K/uL    MPV 10.0 9.4 - 12.3 FL    nRBC 0.00 0.0 - 0.2 K/uL    Differential Type AUTOMATED      Seg Neutrophils 59 43 - 78 %    Lymphocytes 28 13 - 44 %    Monocytes 8 4.0 - 12.0 %    Eosinophils % 3 0.5 - 7.8 %    Basophils 1 0.0 - 2.0 %    Immature Granulocytes 1 0.0 - 5.0 %    Segs Absolute 5.5 1.7 - 8.2 K/UL    Absolute Lymph # 2.6 0.5 - 4.6 K/UL    Absolute Mono # 0.7 0.1 - 1.3 K/UL    Absolute Eos # 0.3 0.0 - 0.8 K/UL    Basophils Absolute 0.1 0.0 - 0.2 K/UL    Absolute Immature Granulocyte 0.1 0.0 - 0.5 K/UL   PLEASE READ & DOCUMENT PPD TEST IN 48 HRS    Collection Time: 08/14/22  8:17 AM   Result Value Ref Range    PPD, (POC) Negative Negative    mm Induration 0 0 - 5 mm           Other Studies:      Current Meds:  Current Facility-Administered Medications   Medication Dose Route Frequency    atorvastatin (LIPITOR) tablet 40 mg  40 mg Oral Daily    levothyroxine (SYNTHROID) tablet 75 mcg  75 mcg Oral Daily    memantine (NAMENDA) tablet 10 mg  10 mg Oral BID    metoprolol succinate (TOPROL XL) extended release tablet 25 mg  25 mg Oral Daily    sodium chloride flush 0.9 % injection 5-40 mL  5-40 mL IntraVENous 2 times per day    sodium chloride flush 0.9 % injection 5-40 mL  5-40 mL IntraVENous PRN    0.9 % sodium chloride infusion   IntraVENous PRN    enoxaparin Sodium (LOVENOX) injection 30 mg  30 mg SubCUTAneous Q12H    ondansetron (ZOFRAN-ODT) disintegrating tablet 4 mg  4 mg Oral Q8H PRN    Or    ondansetron (ZOFRAN) injection 4 mg  4 mg IntraVENous Q6H PRN    polyethylene glycol (GLYCOLAX) packet 17 g  17 g Oral Daily PRN    acetaminophen (TYLENOL) tablet 650 mg  650 mg Oral Q6H PRN    Or    acetaminophen (TYLENOL) suppository 650 mg  650 mg Rectal Q6H PRN    HYDROcodone-acetaminophen (NORCO) 5-325 MG per tablet 1 tablet  1 tablet Oral Q6H PRN       Signed:  Filiberto Monzon, APRN - CNP    Part of this note may have been written by using a voice dictation software. The note has been proof read but may still contain some grammatical/other typographical errors.

## 2022-08-15 NOTE — PLAN OF CARE
Problem: Discharge Planning  Goal: Discharge to home or other facility with appropriate resources  Outcome: Progressing  Flowsheets (Taken 8/14/2022 1950)  Discharge to home or other facility with appropriate resources: Identify barriers to discharge with patient and caregiver     Problem: Pain  Goal: Verbalizes/displays adequate comfort level or baseline comfort level  Outcome: Progressing     Problem: Skin/Tissue Integrity  Goal: Absence of new skin breakdown  Description: 1. Monitor for areas of redness and/or skin breakdown  2. Assess vascular access sites hourly  3. Every 4-6 hours minimum:  Change oxygen saturation probe site  4. Every 4-6 hours:  If on nasal continuous positive airway pressure, respiratory therapy assess nares and determine need for appliance change or resting period.   Outcome: Progressing     Problem: Safety - Adult  Goal: Free from fall injury  Outcome: Progressing  Flowsheets (Taken 8/14/2022 1950)  Free From Fall Injury: Instruct family/caregiver on patient safety     Problem: ABCDS Injury Assessment  Goal: Absence of physical injury  Outcome: Progressing  Flowsheets (Taken 8/14/2022 1950)  Absence of Physical Injury: Implement safety measures based on patient assessment Initial (On Arrival)

## 2022-08-15 NOTE — PLAN OF CARE
Problem: Discharge Planning  Goal: Discharge to home or other facility with appropriate resources  8/15/2022 0925 by Vilma Bernabe RN  Outcome: Progressing  8/14/2022 2255 by Blas Jones RN  Outcome: Progressing  Flowsheets (Taken 8/14/2022 1950)  Discharge to home or other facility with appropriate resources: Identify barriers to discharge with patient and caregiver     Problem: Pain  Goal: Verbalizes/displays adequate comfort level or baseline comfort level  8/15/2022 0925 by Vilma Bernabe RN  Outcome: Progressing  8/14/2022 2255 by Blas Jones RN  Outcome: Progressing     Problem: Skin/Tissue Integrity  Goal: Absence of new skin breakdown  Description: 1. Monitor for areas of redness and/or skin breakdown  2. Assess vascular access sites hourly  3. Every 4-6 hours minimum:  Change oxygen saturation probe site  4. Every 4-6 hours:  If on nasal continuous positive airway pressure, respiratory therapy assess nares and determine need for appliance change or resting period.   8/15/2022 0925 by Vilma Bernabe RN  Outcome: Progressing  8/14/2022 2255 by Blas Jones RN  Outcome: Progressing     Problem: Safety - Adult  Goal: Free from fall injury  8/15/2022 0925 by Vilma Bernabe RN  Outcome: Progressing  8/14/2022 2255 by Blas Jones RN  Outcome: Progressing  Flowsheets (Taken 8/14/2022 1950)  Free From Fall Injury: Instruct family/caregiver on patient safety     Problem: ABCDS Injury Assessment  Goal: Absence of physical injury  8/15/2022 0925 by Vilma Bernabe RN  Outcome: Progressing  8/14/2022 2255 by Blas Jones RN  Outcome: Progressing  Flowsheets (Taken 8/14/2022 1950)  Absence of Physical Injury: Implement safety measures based on patient assessment

## 2022-08-15 NOTE — PLAN OF CARE
Problem: Discharge Planning  Goal: Discharge to home or other facility with appropriate resources  8/15/2022 1958 by Ulisses Bell RN  Outcome: Progressing  Flowsheets (Taken 8/15/2022 1940)  Discharge to home or other facility with appropriate resources: Identify barriers to discharge with patient and caregiver  8/15/2022 0925 by Lor Holden RN  Outcome: Progressing     Problem: Pain  Goal: Verbalizes/displays adequate comfort level or baseline comfort level  8/15/2022 1958 by Ulisses Bell RN  Outcome: Progressing  8/15/2022 0925 by Lor Holden RN  Outcome: Progressing     Problem: Skin/Tissue Integrity  Goal: Absence of new skin breakdown  Description: 1. Monitor for areas of redness and/or skin breakdown  2. Assess vascular access sites hourly  3. Every 4-6 hours minimum:  Change oxygen saturation probe site  4. Every 4-6 hours:  If on nasal continuous positive airway pressure, respiratory therapy assess nares and determine need for appliance change or resting period.   8/15/2022 1958 by Ulisses Bell RN  Outcome: Progressing  8/15/2022 0925 by Lor Holden RN  Outcome: Progressing     Problem: Safety - Adult  Goal: Free from fall injury  8/15/2022 1958 by Ulisses Bell RN  Outcome: Progressing  Flowsheets (Taken 8/15/2022 1940)  Free From Fall Injury: Instruct family/caregiver on patient safety  8/15/2022 0925 by Lor Holden RN  Outcome: Progressing     Problem: ABCDS Injury Assessment  Goal: Absence of physical injury  8/15/2022 1958 by Ulisses Bell RN  Outcome: Progressing  Flowsheets (Taken 8/15/2022 1940)  Absence of Physical Injury: Implement safety measures based on patient assessment  8/15/2022 0925 by Lor Holden RN  Outcome: Progressing

## 2022-08-15 NOTE — PROGRESS NOTES
PHYSICAL THERAPY: Daily Note AM   (Link to Caseload Tracking: PT Visit Days : 2  Time In/Out PT Charge Capture  Rehab Caseload Tracker  Orders    Robert Mckeon is a 67 y.o. female   PRIMARY DIAGNOSIS: Inability to walk  Inability to walk [R26.2]       Inpatient: Payor: MEDICARE / Plan: MEDICARE PART A AND B / Product Type: *No Product type* /     ASSESSMENT:     REHAB RECOMMENDATIONS:   Recommendation to date pending progress:  Setting:  Short-term Rehab    Equipment:    To Be Determined     ASSESSMENT:  Ms. Abilio Johnson was supine upon contact and agreeable to PT requesting to use BSC. Patient performed supine to sit with SBA and cues for technique. Once seated EOB patient had significant difficulty with sit to stand. Gait belt utilized as patient would not allow us to assist under the axillary which made transfer even harder. Patient was a HEAVY max assist x 2 sit to stand transfer. Once standing patient demonstrated POOR standing balance with inability to take steps or pivot to [facility]. Patient returned to sitting and to supine with mod assist (for LE's). Nursing placed patient on bedpan but she was unable to have BM. Patient then participated in LE exercises supine with AAROM provided. Slow progress. Will continue PT efforts. SUBJECTIVE:   Ms. Abilio Johnson states, \"I can't do it! \"     Social/Functional Lives With: Other (comment) ([facility])  Type of Home: Assisted living  Home Equipment: Walker, rolling (needs new one; current one broken)  Receives Help From: Other (comment) ([facility] staff)  ADL Assistance: Needs assistance  Homemaking Assistance: Needs assistance  Ambulation Assistance: Independent  Transfer Assistance: Independent  Active : No  Occupation: Retired  OBJECTIVE:     PAIN: VITALS / O2: PRECAUTION / [facility] [place] [facility]:   Pre Treatment: 0         Post Treatment: 0 Vitals        Oxygen    [place]    RESTRICTIONS/PRECAUTIONS:  Restrictions/Precautions  Restrictions/Precautions: Fall Risk  Restrictions/Precautions: Fall Risk     MOBILITY: I Mod I S SBA CGA Min Mod Max Total  NT x2 Comments:   Bed Mobility    Rolling [] [] [] [] [] [] [] [] [] [] []    Supine to Sit [] [] [] [x] [] [] [] [] [] [] []    Scooting [] [] [] [] [] [] [] [] [] [] []    Sit to Supine [] [] [] [] [] [] [] [] [] [] []    Transfers    Sit to Stand [] [] [] [] [] [] [] [x] [] [] []    Bed to Chair [] [] [] [] [] [] [] [] [] [] []    Stand to Sit [] [] [] [] [] [] [] [x] [] [] []     [] [] [] [] [] [] [] [] [] [] []    I=Independent, Mod I=Modified Independent, S=Supervision, SBA=Standby Assistance, CGA=Contact Guard Assistance,   Min=Minimal Assistance, Mod=Moderate Assistance, Max=Maximal Assistance, Total=Total Assistance, NT=Not Tested    BALANCE: Good Fair+ Fair Fair- Poor NT Comments   Sitting Static [x] [] [] [] [] []    Sitting Dynamic [] [x] [] [] [] []              Standing Static [] [] [] [] [x] []    Standing Dynamic [] [] [] [] [x] []      GAIT: I Mod I S SBA CGA Min Mod Max Total  NT x2 Comments:   Level of Assistance [] [] [] [] [] [] [] [] [] [] [] Unable   Distance   Unable    DME Rolling Walker    Gait Quality N/A    Weightbearing Status      Stairs      I=Independent, Mod I=Modified Independent, S=Supervision, SBA=Standby Assistance, CGA=Contact Guard Assistance,   Min=Minimal Assistance, Mod=Moderate Assistance, Max=Maximal Assistance, Total=Total Assistance, NT=Not Tested    PLAN:   ACUTE PHYSICAL THERAPY GOALS:   (Developed with and agreed upon by patient and/or caregiver.)  Pt will perform bed mobility Mod (I) c inc time and cueing in 7 therapy sessions. Pt will perform FULL sit-to-stand/ stand-to-sit transfers c Max (A)x2 in 7 therapy sessions. Pt will ambulate 10 ft Mod (A) with use of LRAD and breaks as needed in 7 therapy sessions. Pt will perform standing dynamic balance activities with minimal postural sway in 7 therapy sessions.   Pt will tolerate multiple sets and reps of BLE exercises in 7 therapy sessions. FREQUENCY AND DURATION: 3 times/week for duration of hospital stay or until stated goals are met, whichever comes first.    TREATMENT:   TREATMENT:   Therapeutic Activity (24 Minutes): Therapeutic activity included Rolling, Supine to Sit, Sit to Supine, Scooting, Transfer Training, Sitting balance , Standing balance, and AAROM LE exercises to improve functional Activity tolerance, Balance, Mobility, and Strength. TREATMENT GRID:  N/A    AFTER TREATMENT PRECAUTIONS: Alarm Activated, Bed, Bed/Chair Locked, Call light within reach, Needs within reach, RN notified, and Side rails x3    INTERDISCIPLINARY COLLABORATION:  RN/ PCT and PT/ PTA    EDUCATION:      TIME IN/OUT:  Time In: 1042  Time Out: 1106  Minutes: Lilian Garsia PTA

## 2022-08-15 NOTE — PROGRESS NOTES
1607 Update:   PT is still recommending rehab at d/c - patient has been accepted at [facility]. Spoke with patient's sister, Bandar Nielsen to update. Also updated patient's provider, Edson Wick.     ________________________________    4951 Update:  Received communicate from patient's sister, Bandar Nielsen who has chosen facilities in the event therapy still feels patient is in need of STR at d/c -   [facility]     Referrals sent - awaiting review. -----------------------------------------------------    CM following for d/c planning. Spoke with patient's sister, Edita Garcia : [phone]. She stated that patient lives at [facility] assisted living in [facility] but the assisted living stated that they will be unable to accept patient back if she can't stand and go to the bathroom without assistance. The most recent therapy notes are from 8.12.2022 and the recommendations were for STR. Patient's sister, Bandar Nielsen stated that she hopes patient has progressed and wishes to wait for therapy evaluations and recommendations from today 8.15.2022. In the meantime, I emailed her a list of skilled facility options for short term rehab that she will review. I plan to call patient's sister back to discuss a plan after therapy session today. CM following.

## 2022-08-16 LAB
GLUCOSE BLD STRIP.AUTO-MCNC: 203 MG/DL (ref 65–100)
GLUCOSE BLD STRIP.AUTO-MCNC: 215 MG/DL (ref 65–100)
SARS-COV-2 RDRP RESP QL NAA+PROBE: NOT DETECTED
SERVICE CMNT-IMP: ABNORMAL
SERVICE CMNT-IMP: ABNORMAL
SOURCE: NORMAL

## 2022-08-16 PROCEDURE — 6360000002 HC RX W HCPCS: Performed by: FAMILY MEDICINE

## 2022-08-16 PROCEDURE — 2580000003 HC RX 258: Performed by: FAMILY MEDICINE

## 2022-08-16 PROCEDURE — 87635 SARS-COV-2 COVID-19 AMP PRB: CPT

## 2022-08-16 PROCEDURE — 1100000000 HC RM PRIVATE

## 2022-08-16 PROCEDURE — 97530 THERAPEUTIC ACTIVITIES: CPT

## 2022-08-16 PROCEDURE — 97535 SELF CARE MNGMENT TRAINING: CPT

## 2022-08-16 PROCEDURE — 82962 GLUCOSE BLOOD TEST: CPT

## 2022-08-16 PROCEDURE — 6370000000 HC RX 637 (ALT 250 FOR IP): Performed by: FAMILY MEDICINE

## 2022-08-16 PROCEDURE — 6370000000 HC RX 637 (ALT 250 FOR IP): Performed by: NURSE PRACTITIONER

## 2022-08-16 RX ORDER — DEXTROSE MONOHYDRATE 100 MG/ML
INJECTION, SOLUTION INTRAVENOUS CONTINUOUS PRN
Status: DISCONTINUED | OUTPATIENT
Start: 2022-08-16 | End: 2022-08-17 | Stop reason: HOSPADM

## 2022-08-16 RX ORDER — INSULIN LISPRO 100 [IU]/ML
0-8 INJECTION, SOLUTION INTRAVENOUS; SUBCUTANEOUS
Status: DISCONTINUED | OUTPATIENT
Start: 2022-08-16 | End: 2022-08-16

## 2022-08-16 RX ORDER — INSULIN LISPRO 100 [IU]/ML
0-8 INJECTION, SOLUTION INTRAVENOUS; SUBCUTANEOUS
Status: DISCONTINUED | OUTPATIENT
Start: 2022-08-17 | End: 2022-08-17 | Stop reason: HOSPADM

## 2022-08-16 RX ORDER — INSULIN LISPRO 100 [IU]/ML
0-4 INJECTION, SOLUTION INTRAVENOUS; SUBCUTANEOUS NIGHTLY
Status: DISCONTINUED | OUTPATIENT
Start: 2022-08-16 | End: 2022-08-17 | Stop reason: HOSPADM

## 2022-08-16 RX ADMIN — INSULIN LISPRO 2 UNITS: 100 INJECTION, SOLUTION INTRAVENOUS; SUBCUTANEOUS at 16:50

## 2022-08-16 RX ADMIN — MEMANTINE HYDROCHLORIDE 10 MG: 5 TABLET, FILM COATED ORAL at 07:43

## 2022-08-16 RX ADMIN — ATORVASTATIN CALCIUM 40 MG: 40 TABLET, FILM COATED ORAL at 07:43

## 2022-08-16 RX ADMIN — ENOXAPARIN SODIUM 30 MG: 100 INJECTION SUBCUTANEOUS at 20:41

## 2022-08-16 RX ADMIN — ENOXAPARIN SODIUM 30 MG: 100 INJECTION SUBCUTANEOUS at 07:43

## 2022-08-16 RX ADMIN — SODIUM CHLORIDE, PRESERVATIVE FREE 10 ML: 5 INJECTION INTRAVENOUS at 07:43

## 2022-08-16 RX ADMIN — MEMANTINE HYDROCHLORIDE 10 MG: 5 TABLET, FILM COATED ORAL at 20:41

## 2022-08-16 RX ADMIN — SODIUM CHLORIDE, PRESERVATIVE FREE 5 ML: 5 INJECTION INTRAVENOUS at 20:41

## 2022-08-16 RX ADMIN — LEVOTHYROXINE SODIUM 75 MCG: 0.07 TABLET ORAL at 05:20

## 2022-08-16 RX ADMIN — METOPROLOL SUCCINATE 25 MG: 25 TABLET, EXTENDED RELEASE ORAL at 07:43

## 2022-08-16 ASSESSMENT — PAIN SCALES - GENERAL
PAINLEVEL_OUTOF10: 0

## 2022-08-16 ASSESSMENT — PAIN DESCRIPTION - DESCRIPTORS: DESCRIPTORS: SORE

## 2022-08-16 ASSESSMENT — PAIN DESCRIPTION - LOCATION: LOCATION: KNEE

## 2022-08-16 ASSESSMENT — PAIN DESCRIPTION - ORIENTATION: ORIENTATION: LEFT

## 2022-08-16 NOTE — PROGRESS NOTES
Hospitalist Progress Note   Admit Date:  2022  2:11 AM   Name:  Nahomi Singh   Age:  67 y.o. Sex:  female  :  1950   MRN:  [id number]   Room:  [facility]/    Presenting Complaint: No chief complaint on file. Reason(s) for Admission: Inability to walk [R26.2]     Hospital Course & Interval History:   Nahomi Singh is a 67 y.o. female with medical history of DM2, afib, hypothyroidism, dementia who presented to ED after a fall at [facility]. She has been unable to ambulate since the fall. At baseline she walks with a walker. In the ER  XR negative for fracture or dislocation. CT pelvis without any evidence of pelvic or hip fracture. There is mention of a \"large soft tissue pelvic mass\" known to patient and daughter. Due to [facility] not being able to take patient back if she cannot ambulate, hospitalist consulted for admission. Subjective/24hr Events (22):  8/15/22 Encouraged patient to get OOB. Reports minimal pain in left knee today. AROM/PROM will be beneficial   22 Patient does not meet medical necessity for IRC    ROS:  10 systems reviewed and negative except as noted above. Assessment & Plan:   Inability to Ambulate  22  - No evidence of fractures on imaging. Patient reports knee pain after fall makes it difficult to ambulate  - PT/OT with recs for STR  - Prn analgesia  - PPD ordered  22  -CM continues working on [employer] referrals; appreciate assistance  -Likely discharge tomorrow to [facility]        DM2  - Accu checks with SSI  - A1c 6.3 3/14/22  8/13/22  -BGL in acceptable range      Afib  - Rate controlled  - Home meds     Hypothyroidism  - Continue home synthroid     Uterine Mass  - Known  - Follows with her own physician for this     Dementia  - Home meds        Discharge Planning:      Dispo pending rehab placement    Diet:  ADULT DIET;  Regular  DVT PPx: Lovenox SQ  Code status: FULL CODE        Objective:   Patient Vitals for the past 24 hrs:   Temp Pulse Resp BP SpO2   08/16/22 1224 98.6 °F (37 °C) 87 20 130/72 91 %   08/16/22 0752 97.7 °F (36.5 °C) 74 18 128/83 94 %   08/16/22 0445 98.1 °F (36.7 °C) 89 20 127/78 95 %   08/15/22 2332 97.7 °F (36.5 °C) 92 18 137/79 93 %   08/15/22 1909 98.4 °F (36.9 °C) 87 20 131/79 93 %   08/15/22 1643 -- 81 18 (!) 158/86 95 %         Estimated body mass index is 39.57 kg/m² as calculated from the following:    Height as of this encounter: 5' 7\" (1.702 m). Weight as of this encounter: 252 lb 10.4 oz (114.6 kg). Intake/Output Summary (Last 24 hours) at 8/16/2022 1403  Last data filed at 8/15/2022 2041  Gross per 24 hour   Intake 236 ml   Output 600 ml   Net -364 ml         Physical Exam:     Blood pressure 130/72, pulse 87, temperature 98.6 °F (37 °C), temperature source Oral, resp. rate 20, height 5' 7\" (1.702 m), weight 252 lb 10.4 oz (114.6 kg), SpO2 91 %. General:    No overt distress  Head:  Normocephalic, atraumatic  Eyes:  Sclerae appear normal.  Pupils equally round. ENT:  Nares appear normal, no drainage. Moist oral mucosa  Neck:  No restricted ROM. Trachea midline   CV:   RRR. No m/r/g. No jugular venous distension. Lungs:   CTAB. No wheezing, rhonchi, or rales. Respirations even, unlabored  Abdomen: Bowel sounds present. Soft, nontender, nondistended. Extremities: No cyanosis or clubbing. No edema  Skin:     No rashes and normal coloration. Warm and dry. Neuro:  CN II-XII grossly intact. Sensation intact. A&Ox3  Psych:  Normal mood and flat affect.       I have reviewed ordered lab tests and independently visualized imaging below:    Recent Labs:  Recent Results (from the past 48 hour(s))   PLEASE READ & DOCUMENT PPD TEST IN 72 HRS    Collection Time: 08/15/22  7:00 AM   Result Value Ref Range    PPD, (POC) Negative Negative    mm Induration 0 0 - 5 mm           Other Studies:      Current Meds:  Current Facility-Administered Medications   Medication Dose Route Frequency atorvastatin (LIPITOR) tablet 40 mg  40 mg Oral Daily    levothyroxine (SYNTHROID) tablet 75 mcg  75 mcg Oral Daily    memantine (NAMENDA) tablet 10 mg  10 mg Oral BID    metoprolol succinate (TOPROL XL) extended release tablet 25 mg  25 mg Oral Daily    sodium chloride flush 0.9 % injection 5-40 mL  5-40 mL IntraVENous 2 times per day    sodium chloride flush 0.9 % injection 5-40 mL  5-40 mL IntraVENous PRN    0.9 % sodium chloride infusion   IntraVENous PRN    enoxaparin Sodium (LOVENOX) injection 30 mg  30 mg SubCUTAneous Q12H    ondansetron (ZOFRAN-ODT) disintegrating tablet 4 mg  4 mg Oral Q8H PRN    Or    ondansetron (ZOFRAN) injection 4 mg  4 mg IntraVENous Q6H PRN    polyethylene glycol (GLYCOLAX) packet 17 g  17 g Oral Daily PRN    acetaminophen (TYLENOL) tablet 650 mg  650 mg Oral Q6H PRN    Or    acetaminophen (TYLENOL) suppository 650 mg  650 mg Rectal Q6H PRN    HYDROcodone-acetaminophen (NORCO) 5-325 MG per tablet 1 tablet  1 tablet Oral Q6H PRN       Signed:  Chilo Hanson APRN - CNP    Part of this note may have been written by using a voice dictation software. The note has been proof read but may still contain some grammatical/other typographical errors.

## 2022-08-16 NOTE — WOUND CARE
Patient seen for intertrigo to panus fold. No open area or yeast to this. Stop creams and wash with hibiclens cloths, dry and place ABD's in fold to keep dry. Gluteal cleft fold has moisture and redness from incontinence. Add zinc paste to skin area. Patient updated and in agreement. Wound team will sign off, call if needed further.

## 2022-08-16 NOTE — PLAN OF CARE
Problem: Discharge Planning  Goal: Discharge to home or other facility with appropriate resources  8/16/2022 0825 by Radames Magaña RN  Outcome: Progressing  8/15/2022 1958 by Kenrick Vidal RN  Outcome: Progressing  Flowsheets (Taken 8/15/2022 1940)  Discharge to home or other facility with appropriate resources: Identify barriers to discharge with patient and caregiver     Problem: Pain  Goal: Verbalizes/displays adequate comfort level or baseline comfort level  8/16/2022 0825 by Radames Magaña RN  Outcome: Progressing  8/15/2022 1958 by Kenrick Vidal RN  Outcome: Progressing  Flowsheets (Taken 8/15/2022 1909)  Verbalizes/displays adequate comfort level or baseline comfort level:   Encourage patient to monitor pain and request assistance   Assess pain using appropriate pain scale   Administer analgesics based on type and severity of pain and evaluate response     Problem: Skin/Tissue Integrity  Goal: Absence of new skin breakdown  Description: 1. Monitor for areas of redness and/or skin breakdown  2. Assess vascular access sites hourly  3. Every 4-6 hours minimum:  Change oxygen saturation probe site  4. Every 4-6 hours:  If on nasal continuous positive airway pressure, respiratory therapy assess nares and determine need for appliance change or resting period.   8/16/2022 0825 by Radames Magaña RN  Outcome: Progressing  8/15/2022 1958 by Kenrick Vidal RN  Outcome: Progressing     Problem: Safety - Adult  Goal: Free from fall injury  8/16/2022 0825 by Radames Magaña RN  Outcome: Progressing  8/15/2022 1958 by Kenrick Vidal RN  Outcome: Progressing  Flowsheets (Taken 8/15/2022 1940)  Free From Fall Injury: Instruct family/caregiver on patient safety     Problem: ABCDS Injury Assessment  Goal: Absence of physical injury  8/16/2022 0825 by Radames Magaña RN  Outcome: Progressing  8/15/2022 1958 by Kenrick Vidal RN  Outcome: Progressing  Flowsheets (Taken 8/15/2022 1940)  Absence of Physical Injury: Implement safety measures based on patient assessment     Problem: Chronic Conditions and Co-morbidities  Goal: Patient's chronic conditions and co-morbidity symptoms are monitored and maintained or improved  Outcome: Progressing

## 2022-08-16 NOTE — PROGRESS NOTES
OCCUPATIONAL THERAPY: Daily Note PM   OT Visit Days: 2   Time  OT Charge Capture  Rehab Caseload Tracker  OT Orders    Jeromy Russell is a 67 y.o. female   PRIMARY DIAGNOSIS: Inability to walk  Inability to walk [R26.2]       Inpatient: Payor: MEDICARE / Plan: MEDICARE PART A AND B / Product Type: *No Product type* /     ASSESSMENT:     REHAB RECOMMENDATIONS: CURRENT LEVEL OF FUNCTION:  (Most Recently Demonstrated)   Recommendation to date pending progress:  Setting:  Short-term Rehab    Equipment:    Rolling Walker Bathing:  Not Tested  Dressing:  Minimal Assist  Feeding/Grooming:  Stand by Assist  Toileting:  Not Tested  Functional Mobility:  Moderate Assist with rolling in bed     ASSESSMENT:  Ms. Gypsy Bosworth presents with decreased independence with ADL's and decreased independence with mobility d/t knee pain and pt being very self limiting. She initially was going to decline any therapy. Minimal participation with ADL's. Could not stand d/t knee pain. Poor effort. Will continue therapeutic efforts.        SUBJECTIVE:     Ms. Gypsy Bosworth states, \"I don't want to do therapy today\"     Social/Functional Lives With: Other (comment) ([address] [place] [address])  Type of Home: Assisted living  Home Equipment: Areta Nay, rolling (needs new one; current one broken)  Receives Help From: Other (comment) ([facility] staff)  ADL Assistance: Needs assistance  Homemaking Assistance: Needs assistance  Ambulation Assistance: Independent  Transfer Assistance: Independent  Active : No  Occupation: Retired    OBJECTIVE:     LINES / Flash Lacks / Maritza Fluke: Shan Felling    RESTRICTIONS/PRECAUTIONS:  Restrictions/Precautions  Restrictions/Precautions: Fall Risk        PAIN: [place] / O2:   Pre Treatment:   Pain Location: Knee  Pain Orientation: Left  Pain Descriptors: Sore          Post Treatment: no complaints at rest Vitals          Oxygen            MOBILITY: I Mod I S SBA CGA Min Mod Max Total  NT x2 Comments:   Bed Mobility    Rolling [] [] [] [] [] [] [x] [] [] caregiver.)    1. Pt will tolerate standing at RW with F balance for at least 1 minute. 2. Pt will complete toileting Min A with AE as needed. 3. Pt will tolerate 25 minutes of OT treatment requiring 1-2 breaks as needed. 4. Pt will complete functional mobility via RW Mod A.  5. Pt will tolerate BUE exercises to increase strength for safe, functional transfers, ADL participation        TREATMENT:     TREATMENT:   Co-Treatment PT/OT necessary due to patient's decreased overall endurance/tolerance levels, as well as need for high level skilled assistance to complete functional transfers/mobility and functional tasks  Self Care (23 minutes): Patient participated in grooming ADLs in unsupported sitting with minimal verbal and tactile cueing to increase independence, decrease assistance required, and increase activity tolerance. Patient also participated in functional mobility and functional transfer training to increase independence, decrease assistance required, and increase activity tolerance.      TREATMENT GRID:  N/A    AFTER TREATMENT PRECAUTIONS: Alarm Activated, Bed, Bed/Chair Locked, Call light within reach, Needs within reach, RN notified, and Side rails x3    INTERDISCIPLINARY COLLABORATION:  RN/ PCT, PT/ PTA, and OT/ JAUREGUI    EDUCATION:       TOTAL TREATMENT DURATION AND TIME:  Time In: 1335  Time Out: [facility]  Minutes: [facility]

## 2022-08-16 NOTE — PROGRESS NOTES
PHYSICAL THERAPY: Daily Note AM   (Link to Caseload Tracking: PT Visit Days : 3  Time In/Out PT Charge Capture  Rehab Caseload Tracker  Orders    Charles Munroe is a 67 y.o. female   PRIMARY DIAGNOSIS: Inability to walk  Inability to walk [R26.2]       Inpatient: Payor: MEDICARE / Plan: MEDICARE PART A AND B / Product Type: *No Product type* /     ASSESSMENT:     REHAB RECOMMENDATIONS:   Recommendation to date pending progress:  Setting:  Short-term Rehab    Equipment:    To Be Determined     ASSESSMENT:  Ms. Sherene Gottron was supine upon contact and agreeable to PT with encouragement. Patient presents confused with decreased motivation. Patient performed supine to sit with SBA and cues for technique. Once seated EOB patient participated in static/dynamic sitting balance activities while performing ADL Activity with OT. Patient demonstrated good static sitting balance and fair+ dynamic sitting balance. Patient then had significant difficulty with sit to stand. Gait belt utilized as patient would not allow us to assist under the axillary which made transfer even harder. Patient  unable to clear buttocks despite MAX assist x 2. Patient returned to supine where she rolled left and right with mod assist. Slow progress. Will continue PT efforts.      SUBJECTIVE:   Ms. Sherene Gottron states, \"I just can't do it\"     Social/Functional Lives With: Other (comment) ([address])  Type of Home: Assisted living  Home Equipment: Walker, rolling (needs new one; current one broken)  [facility]  ADL Assistance: Needs assistance  Homemaking Assistance: Needs assistance  Ambulation Assistance: Independent  Transfer Assistance: Independent  Active : No  Occupation: Retired  OBJECTIVE:     PAIN: VITALS / O2: PRECAUTION / [place] / DRAINS:   Pre Treatment:   Pain Level: 0      Post Treatment: 0 Vitals        Oxygen Purewick    RESTRICTIONS/PRECAUTIONS:  Restrictions/Precautions  Restrictions/Precautions: Fall Risk  Restrictions/Precautions: Fall Risk     MOBILITY: I Mod I S SBA CGA Min Mod Max Total  NT x2 Comments:   Bed Mobility    Rolling [] [] [] [] [] [] [] [] [] [] []    Supine to Sit [] [] [] [x] [] [] [] [] [] [] []    Scooting [] [] [] [] [] [] [] [] [] [] []    Sit to Supine [] [] [] [] [] [x] [] [] [] [] []    Transfers    Sit to Stand [] [] [] [] [] [] [] [x] [] [] [x] Unable to clear buttocks   Bed to Chair [] [] [] [] [] [] [] [] [] [] []    Stand to Sit [] [] [] [] [] [] [] [] [] [] []     [] [] [] [] [] [] [] [] [] [] []    I=Independent, Mod I=Modified Independent, S=Supervision, SBA=Standby Assistance, CGA=Contact Guard Assistance,   Min=Minimal Assistance, Mod=Moderate Assistance, Max=Maximal Assistance, Total=Total Assistance, NT=Not Tested    BALANCE: Good Fair+ Fair Fair- Poor NT Comments   Sitting Static [x] [] [] [] [] []    Sitting Dynamic [] [x] [] [] [] []              Standing Static [] [] [] [] [] [] Unable to clear buttocks   Standing Dynamic [] [] [] [] [] []      GAIT: I Mod I S SBA CGA Min Mod Max Total  NT x2 Comments:   Level of Assistance [] [] [] [] [] [] [] [] [] [] [] Unable   Distance   Unable    DME Rolling Walker    Gait Quality N/A    Weightbearing Status      Stairs      I=Independent, Mod I=Modified Independent, S=Supervision, SBA=Standby Assistance, CGA=Contact Guard Assistance,   Min=Minimal Assistance, Mod=Moderate Assistance, Max=Maximal Assistance, Total=Total Assistance, NT=Not Tested    PLAN:   ACUTE PHYSICAL THERAPY GOALS:   (Developed with and agreed upon by patient and/or caregiver.)  Pt will perform bed mobility Mod (I) c inc time and cueing in 7 therapy sessions. Pt will perform FULL sit-to-stand/ stand-to-sit transfers c Max (A)x2 in 7 therapy sessions. Pt will ambulate 10 ft Mod (A) with use of LRAD and breaks as needed in 7 therapy sessions.   Pt will perform standing dynamic balance activities with minimal postural sway in 7 therapy sessions. Pt will tolerate multiple sets and reps of BLE exercises in 7 therapy sessions. FREQUENCY AND DURATION: 3 times/week for duration of hospital stay or until stated goals are met, whichever comes first.    TREATMENT:   TREATMENT:   Co-Treatment PT/OT necessary due to patient's decreased overall endurance/tolerance levels, as well as need for high level skilled assistance to complete functional transfers/mobility and functional tasks  Therapeutic Activity (23 Minutes): Therapeutic activity included Rolling, Supine to Sit, Sit to Supine, Scooting, Transfer Training, Sitting balance , and AAROM LE exercises to improve functional Activity tolerance, Balance, Mobility, Strength, and ROM. TREATMENT GRID:  N/A    AFTER TREATMENT PRECAUTIONS: Alarm Activated, Bed, Bed/Chair Locked, Call light within reach, Needs within reach, RN notified, and Side rails x3    INTERDISCIPLINARY COLLABORATION:  RN/ PCT, PT/ PTA, and OT/ JAUREGUI    EDUCATION:      TIME IN/OUT:  Time In: 1335  Time Out: [address]  Minutes: [address].  XUAN Garsia

## 2022-08-17 VITALS
WEIGHT: 252.65 LBS | TEMPERATURE: 98.2 F | RESPIRATION RATE: 19 BRPM | OXYGEN SATURATION: 95 % | SYSTOLIC BLOOD PRESSURE: 142 MMHG | HEIGHT: 67 IN | BODY MASS INDEX: 39.65 KG/M2 | HEART RATE: 86 BPM | DIASTOLIC BLOOD PRESSURE: 84 MMHG

## 2022-08-17 PROBLEM — E11.21 TYPE 2 DIABETES WITH NEPHROPATHY (HCC): Chronic | Status: ACTIVE | Noted: 2019-01-14

## 2022-08-17 PROBLEM — E78.00 HYPERCHOLESTEROLEMIA: Chronic | Status: ACTIVE | Noted: 2019-04-25

## 2022-08-17 PROBLEM — I48.91 ATRIAL FIBRILLATION (HCC): Chronic | Status: ACTIVE | Noted: 2018-09-19

## 2022-08-17 PROBLEM — Z79.01 CHRONIC ANTICOAGULATION: Status: ACTIVE | Noted: 2018-10-19

## 2022-08-17 PROBLEM — E11.9 TYPE 2 DIABETES MELLITUS WITHOUT COMPLICATION, WITHOUT LONG-TERM CURRENT USE OF INSULIN (HCC): Chronic | Status: ACTIVE | Noted: 2018-09-19

## 2022-08-17 PROBLEM — F03.90 DEMENTIA WITHOUT BEHAVIORAL DISTURBANCE (HCC): Chronic | Status: ACTIVE | Noted: 2018-09-19

## 2022-08-17 PROBLEM — E66.01 SEVERE OBESITY (BMI 35.0-39.9) WITH COMORBIDITY (HCC): Chronic | Status: ACTIVE | Noted: 2019-01-14

## 2022-08-17 PROBLEM — E78.00 HYPERCHOLESTEROLEMIA: Status: ACTIVE | Noted: 2019-04-25

## 2022-08-17 PROBLEM — E11.9 TYPE 2 DIABETES MELLITUS WITHOUT COMPLICATION, WITHOUT LONG-TERM CURRENT USE OF INSULIN (HCC): Chronic | Status: RESOLVED | Noted: 2018-09-19 | Resolved: 2022-08-17

## 2022-08-17 PROBLEM — E03.9 ACQUIRED HYPOTHYROIDISM: Chronic | Status: ACTIVE | Noted: 2018-09-19

## 2022-08-17 PROBLEM — N85.8 UTERINE MASS: Chronic | Status: ACTIVE | Noted: 2018-10-17

## 2022-08-17 PROBLEM — E66.09 CLASS 2 OBESITY DUE TO EXCESS CALORIES WITHOUT SERIOUS COMORBIDITY WITH BODY MASS INDEX (BMI) OF 37.0 TO 37.9 IN ADULT: Status: ACTIVE | Noted: 2018-09-19

## 2022-08-17 PROBLEM — E66.09 CLASS 2 OBESITY DUE TO EXCESS CALORIES WITHOUT SERIOUS COMORBIDITY WITH BODY MASS INDEX (BMI) OF 37.0 TO 37.9 IN ADULT: Status: RESOLVED | Noted: 2018-09-19 | Resolved: 2022-01-01

## 2022-08-17 LAB
ANION GAP SERPL CALC-SCNC: 5 MMOL/L (ref 7–16)
BUN SERPL-MCNC: 21 MG/DL (ref 8–23)
CALCIUM SERPL-MCNC: 9.2 MG/DL (ref 8.3–10.4)
CHLORIDE SERPL-SCNC: 107 MMOL/L (ref 98–107)
CO2 SERPL-SCNC: 24 MMOL/L (ref 21–32)
CREAT SERPL-MCNC: 0.8 MG/DL (ref 0.6–1)
ERYTHROCYTE [DISTWIDTH] IN BLOOD BY AUTOMATED COUNT: 13.7 % (ref 11.9–14.6)
GLUCOSE BLD STRIP.AUTO-MCNC: 154 MG/DL (ref 65–100)
GLUCOSE BLD STRIP.AUTO-MCNC: 197 MG/DL (ref 65–100)
GLUCOSE SERPL-MCNC: 168 MG/DL (ref 65–100)
HCT VFR BLD AUTO: 45 % (ref 35.8–46.3)
HGB BLD-MCNC: 14.4 G/DL (ref 11.7–15.4)
MCH RBC QN AUTO: 28.1 PG (ref 26.1–32.9)
MCHC RBC AUTO-ENTMCNC: 32 G/DL (ref 31.4–35)
MCV RBC AUTO: 87.9 FL (ref 79.6–97.8)
NRBC # BLD: 0 K/UL (ref 0–0.2)
PLATELET # BLD AUTO: 273 K/UL (ref 150–450)
PMV BLD AUTO: 10.1 FL (ref 9.4–12.3)
POTASSIUM SERPL-SCNC: 3.9 MMOL/L (ref 3.5–5.1)
RBC # BLD AUTO: 5.12 M/UL (ref 4.05–5.2)
SERVICE CMNT-IMP: ABNORMAL
SERVICE CMNT-IMP: ABNORMAL
SODIUM SERPL-SCNC: 136 MMOL/L (ref 136–145)
WBC # BLD AUTO: 9.5 K/UL (ref 4.3–11.1)

## 2022-08-17 PROCEDURE — 6370000000 HC RX 637 (ALT 250 FOR IP): Performed by: FAMILY MEDICINE

## 2022-08-17 PROCEDURE — 85027 COMPLETE CBC AUTOMATED: CPT

## 2022-08-17 PROCEDURE — 36415 COLL VENOUS BLD VENIPUNCTURE: CPT

## 2022-08-17 PROCEDURE — 80048 BASIC METABOLIC PNL TOTAL CA: CPT

## 2022-08-17 PROCEDURE — 2580000003 HC RX 258: Performed by: FAMILY MEDICINE

## 2022-08-17 PROCEDURE — 6360000002 HC RX W HCPCS: Performed by: FAMILY MEDICINE

## 2022-08-17 PROCEDURE — 82962 GLUCOSE BLOOD TEST: CPT

## 2022-08-17 RX ADMIN — ENOXAPARIN SODIUM 30 MG: 100 INJECTION SUBCUTANEOUS at 09:04

## 2022-08-17 RX ADMIN — METOPROLOL SUCCINATE 25 MG: 25 TABLET, EXTENDED RELEASE ORAL at 09:04

## 2022-08-17 RX ADMIN — MEMANTINE HYDROCHLORIDE 10 MG: 5 TABLET, FILM COATED ORAL at 09:04

## 2022-08-17 RX ADMIN — SODIUM CHLORIDE, PRESERVATIVE FREE 10 ML: 5 INJECTION INTRAVENOUS at 09:04

## 2022-08-17 RX ADMIN — ATORVASTATIN CALCIUM 40 MG: 40 TABLET, FILM COATED ORAL at 09:04

## 2022-08-17 RX ADMIN — LEVOTHYROXINE SODIUM 75 MCG: 0.07 TABLET ORAL at 04:51

## 2022-08-17 ASSESSMENT — PAIN SCALES - GENERAL: PAINLEVEL_OUTOF10: 2

## 2022-08-17 NOTE — DISCHARGE SUMMARY
Hospitalist Discharge Summary   Admit Date:  2022  2:11 AM   DC Note date: 2022  Name:  Murtaza Butt   Age:  67 y.o. Sex:  female  :  1950   MRN:  [id number]   Room:  [facility]  PCP:  VIJAYA Valadez CNP    Presenting Complaint: No chief complaint on file. Initial Admission Diagnosis: Inability to walk [R26.2]     Problem List for this Hospitalization (present on admission):    Principal Problem:    Inability to walk  Active Problems:    Dementia without behavioral disturbance (HCC)    Type 2 diabetes with nephropathy ([facility] [address] without complication, without long-term current use of insulin     Uterine mass    Atrial fibrillation (HCC), rate controlled    Chronic anticoagulation    Acquired hypothyroidism    Hypercholesterolemia    Severe obesity (BMI 35.0-39. 9) with comorbidity [facility])    Hospital Course:  Murtaza Butt is a 67 y.o. female with medical history of DM2, afib, hypothyroidism, dementia who presented to ED after a fall at [facility]. She has been unable to ambulate since the fall. At baseline she walks with a walker. In the ER  XR negative for fracture or dislocation. CT pelvis without any evidence of pelvic or hip fracture. There is mention of a \"large soft tissue pelvic mass\" known to patient and daughter. Due to [facility] not being able to take patient back if she cannot ambulate, hospitalist consulted for admission. No evidence of fractures on imaging. PT/OT with recs for STR. NIDDM controlled. A1c 6.3 3/14/22. May continue home medications and follow up with PCP. A fib rate controlled and stable on home medications. Hypothyroidism, uterine mass and dementia stable without acute changes. BMI 39.57, severe obesity. Recommend continued therapy, exercise and diet changes. Disposition: [facility]  Diet: ADULT DIET;  Regular Healthy  Code Status: Full Code    Follow Ups:   Contact information for follow-up providers     VIJAYA Valadez CNP Follow up in 2 week(s). Specialty: Nurse Practitioner  Contact information:  [address] [facility]  [phone]     Contact information for after-discharge care     Discharge [address] [facility]) . Service: Skilled Nursing  Contact information:  [address] [address] [address]  [phone]    Time spent in patient discharge and coordination 25 minutes. Follow up labs/diagnostics (ultimately defer to outpatient provider):  BG monitoring per PCP    Plan was discussed with patient. All questions answered. Patient was stable at time of discharge. Instructions given to call a physician or return if any concerns.     Current Discharge Medication List        CONTINUE these medications which have NOT CHANGED    Details   FREESTYLE LITE strip USE TO CHECK BLOOD GLUCOSE ONCE DAILY  Qty: 100 strip, Refills: 3      FreeStyle Lancets MISC USE TO CHECK BLOOD GLUCOSE ONCE DAILY  Qty: 100 each, Refills: 3      acetaminophen (TYLENOL) 325 MG tablet Take 650 mg by mouth every 4 hours as needed      ascorbic acid (VITAMIN C) 250 MG tablet Take 250 mg by mouth daily      atorvastatin (LIPITOR) 40 MG tablet TAKE 1 TABLET DAILY      glyBURIDE-metFORMIN (GLUCOVANCE) 2.5-500 MG per tablet TAKE 2 TABLETS TWICE A DAY WITH MEALS      levothyroxine (SYNTHROID) 75 MCG tablet TAKE 1 TABLET DAILY BEFORE BREAKFAST      memantine (NAMENDA) 10 MG tablet TAKE 1 TABLET TWICE A DAY      metoprolol succinate (TOPROL XL) 25 MG extended release tablet Take 25 mg by mouth daily      nystatin (MYCOSTATIN) 371786 UNIT/GM powder Apply powder to skin folds between breasts and abdomen 4 times daily      SITagliptin (JANUVIA) 100 MG tablet Take 100 mg by mouth daily           Procedures done this admission:  * No surgery found *    Consults this admission:  IP CONSULT TO SPIRITUAL SERVICES  IP WOUND CARE NURSE CONSULT TO EVAL    Diagnostic Imaging/Tests:   XR KNEE LEFT (3 VIEWS)    Result Date: 8/11/2022  No evidence of left knee fracture or dislocation. Degenerative 3 compartment left knee changes are stable. CT HEAD WO CONTRAST    Result Date: 8/11/2022  1. No acute intracranial process evident by noncontrast CT study of the head. This report was made using voice transcription. Despite my best efforts to avoid any, transcription errors may persist. If there is any question about the accuracy of the report or need for clarification, then please call [phone], or text me through perfectserv for clarification or correction. CT PELVIS WO CONTRAST Additional Contrast? None    Result Date: 8/11/2022  1. No evidence of pelvic or hip fracture. Hips are located bilaterally. 2. Large soft tissue pelvic mass measuring up to 15 cm likely arising from the uterus. Endometrial or uterine malignancy are suspected in this postmenopausal patient. Gynecologic follow-up recommended. Labs: Results:       BMP, Mg, Phos Recent Labs     08/17/22  0440      K 3.9      CO2 24   ANIONGAP 5*   BUN 21   CREATININE 0.80   LABGLOM >60   GFRAA >60   CALCIUM 9.2   GLUCOSE 168*      CBC Recent Labs     08/17/22  0440   WBC 9.5   RBC 5.12   HGB 14.4   HCT 45.0   MCV 87.9   MCH 28.1   MCHC 32.0   RDW 13.7      MPV 10.1   NRBC 0.00      LFT No results for input(s): BILITOT, BILIDIR, ALKPHOS, AST, ALT, PROT, LABALBU, GLOB in the last 72 hours.    Cardiac  No results found for: NTPROBNP, TROPHS   Coags No results found for: PROTIME, INR, APTT   A1c Lab Results   Component Value Date/Time    LABA1C 6.3 03/14/2022 02:23 AM    LABA1C 6.0 09/03/2021 09:05 AM    LABA1C 6.5 05/28/2021 10:36 AM     03/14/2022 02:23 AM     09/03/2021 09:05 AM     05/28/2021 10:36 AM      Lipids Lab Results   Component Value Date/Time    CHOL 131 03/14/2022 02:23 AM    LDLCALC 51 03/14/2022 02:23 AM    LABVLDL 29 02/04/2020 12:17 PM    HDL 53 03/14/2022 02:23 AM    TRIG 158 03/14/2022 [id number]        No Known Allergies  Immunization History   Administered Date(s) Administered    COVID-19, MODERNA BLUE border, Primary or Immunocompromised, (age 12y+), IM, 100 mcg/0.5mL 01/13/2021, 02/10/2021    Influenza Virus Vaccine 09/26/2007, 09/26/2011, 09/10/2020    Influenza, FLUARIX, FLULAVAL, (age 10 mo+) AND AFLURIA, FLUZONE (age 1 y+), PF 10/11/2018, 10/29/2019    PPD Test 08/12/2022    Pneumococcal Conjugate 13-valent ([id number]) 01/23/2019    Pneumococcal Polysaccharide ([id number]) 02/12/2015    Tdap (Boostrix, Adacel) 02/12/2015    Zoster Recombinant (Shingrix) 06/23/2019, 09/24/2019       Recent Vital Data:  Patient Vitals for the past 24 hrs:   Temp Pulse Resp BP SpO2   08/17/22 1125 98.2 °F (36.8 °C) 86 19 (!) 142/84 95 %   08/17/22 0747 98.2 °F (36.8 °C) 100 19 (!) 141/91 94 %   08/17/22 0415 97.9 °F (36.6 °C) 84 17 130/89 93 %   08/16/22 2327 97.3 °F (36.3 °C) 87 18 132/84 96 %   08/16/22 1924 98.6 °F (37 °C) 89 17 (!) 145/96 93 %   08/16/22 1619 98.4 °F (36.9 °C) 80 17 (!) 133/91 95 %   08/16/22 1224 98.6 °F (37 °C) 87 20 130/72 91 %     Oxygen Therapy  SpO2: 95 %  O2 Device: None (Room air)    Estimated body mass index is 39.57 kg/m² as calculated from the following:    Height as of this encounter: 5' 7\" (1.702 m). Weight as of this encounter: 252 lb 10.4 oz (114.6 kg). Intake/Output Summary (Last 24 hours) at 8/17/2022 1150  Last data filed at 8/17/2022 0415  Gross per 24 hour   Intake --   Output 1300 ml   Net -1300 ml       Physical Exam:  General:    Well nourished. No overt distress. Obese  Head:  Normocephalic, atraumatic  Eyes:  Sclerae appear normal.  Pupils equally round. HENT:  Nares appear normal, no drainage. Moist mucous membranes  Neck:  No restricted ROM. Trachea midline  CV:   RRR. No m/r/g. No JVD  Lungs:   CTAB. No wheezing, rhonchi, or rales. Respirations even, unlabored  Abdomen:   Soft, nontender, nondistended. Extremities: Warm and dry.   No cyanosis or clubbing. No edema. Skin:     No rashes. Normal coloration  Neuro:  CN II-XII grossly intact. Psych:  Normal mood and affect.     Signed:  Alona Chawla PA-C

## 2022-08-17 NOTE — CARE COORDINATION
1235: Patient's room at [facility] changed to 414. ...................................................................................... Patient will d/c today 8.17.2022 to [facility]  Patient and patient's sister César Rush are agreeable to d/c plan. [facility] scheduled for transport at 1:30pm today. Milestones met. CM will continue to follow until d/c is complete. 08/17/22 1123   Service Assessment   Patient Orientation Self;Person;Place   Cognition Alert   History Provided By Patient   Primary [facility] Family Members   Social/Functional History   Lives With Other (comment)  ([facility])   Type of Home Assisted living   ADL Assistance Needs assistance   Ambulation Assistance Needs assistance   Transfer Assistance Needs assistance   Active  No   Discharge Planning   Type of Residence [facility]  ([facility])   [facility]   Mode of Transport at Discharge BLS   Condition of Participation: Discharge Planning   The Patient and/or Patient Representative was provided with a Choice of Provider? Patient   The Patient and/Or Patient Representative agree with the Discharge Plan? Yes   Freedom of Choice list was provided with basic dialogue that supports the patient's individualized plan of care/goals, treatment preferences, and shares the quality data associated with the providers?   Yes

## 2022-08-17 NOTE — PROGRESS NOTES
1122 Update:  Patient is medically stable for d/c - patient will d/c to [address] [place] [address]. Spoke with patient's sister, Alex Barragan who is in agreement with plan. [facility] will [facility] patient up at 1:30pm today. CM will continue to follow as needed until d/c is complete.     ----------------------------------------------------------------------------------------------------------------------    CM following for d/c planning. Patient has been accepted at [facility] for short term rehab. Spoke with patient's sister, Alex Barragan who is in agreement with this d/c plan - d/c to [facility] when medically stable. CM will continue to follow.

## 2022-08-17 NOTE — PLAN OF CARE
Problem: Discharge Planning  Goal: Discharge to home or other facility with appropriate resources  8/16/2022 2125 by Cecy Vaz RN  Outcome: Progressing  Flowsheets (Taken 8/16/2022 1943)  Discharge to home or other facility with appropriate resources: Identify barriers to discharge with patient and caregiver  8/16/2022 0825 by Maria Alejandra Musa RN  Outcome: Progressing     Problem: Pain  Goal: Verbalizes/displays adequate comfort level or baseline comfort level  8/16/2022 2125 by Cecy Vaz RN  Outcome: Progressing  Flowsheets (Taken 8/16/2022 1943)  Verbalizes/displays adequate comfort level or baseline comfort level:   Encourage patient to monitor pain and request assistance   Assess pain using appropriate pain scale  8/16/2022 0825 by Maria Alejandra Musa RN  Outcome: Progressing     Problem: ABCDS Injury Assessment  Goal: Absence of physical injury  8/16/2022 2125 by Cecy Vaz RN  Outcome: Progressing  Flowsheets (Taken 8/16/2022 1943)  Absence of Physical Injury: Implement safety measures based on patient assessment  8/16/2022 0825 by Maria Alejandra Musa RN  Outcome: Progressing

## 2022-08-17 NOTE — DISCHARGE INSTR - DIET

## 2022-08-18 ENCOUNTER — CARE COORDINATION (OUTPATIENT)
Dept: CARE COORDINATION | Facility: CLINIC | Age: 72
End: 2022-08-18

## 2022-08-18 NOTE — CARE COORDINATION
[facility] Discharge Call    2022    Patient: Joana Ferraro Patient : 1950   MRN: [id number]  Reason for Admission: inability to walk  Discharge Date: 22 RARS: Readmission Risk Score: 12.6         Discharge Facility: [address] [place] [address] 17-M  Transition of care outreach postponed for 14 days due to patient's discharge to SNF.      Care Transitions Post Acute Facility Transition            Care Transitions Interventions         Future Appointments   Date Time Provider Berenice Castano   2022  3:20 PM PST LAB PST [facility] AM[facility]   10/3/2022  4:45 PM VIJAYA Saldaña - CNP PST [facility] AMB       Arian Zaldivar RN

## 2022-09-01 ENCOUNTER — CARE COORDINATION (OUTPATIENT)
Dept: CARE COORDINATION | Facility: CLINIC | Age: 72
End: 2022-09-01

## 2022-09-01 NOTE — CARE COORDINATION
[facility] Update Call    2022    Patient: Marisabel Ovalles Patient : 1950   MRN: [id number]  Reason for Admission: inability to walk  Discharge Date: 22 RARS: Readmission Risk Score: 12.6     Patient continues in short term rehab with [facility]. CTN to follow up again next week for tentative d/c dates.     Care Transitions Post Acute Facility Update    Care Transitions Interventions  Post Acute Facility Update

## 2022-09-08 ENCOUNTER — CARE COORDINATION (OUTPATIENT)
Dept: CARE COORDINATION | Facility: CLINIC | Age: 72
End: 2022-09-08

## 2022-09-08 NOTE — CARE COORDINATION
[facility] Update Call    2022    Patient: Rosario Velázquez Patient : 1950   MRN: [id number]  Reason for Admission: inability to walk  Discharge Date: 22 RARS: Readmission Risk Score: 12.6     Patient continues in short term rehab with [facility]. CTN to follow up again next week for tentative d/c dates.      Care Transitions Post Acute Facility Update    Care Transitions Interventions  Post Acute Facility Update

## 2022-09-15 ENCOUNTER — CARE COORDINATION (OUTPATIENT)
Dept: CARE COORDINATION | Facility: CLINIC | Age: 72
End: 2022-09-15

## 2022-09-15 NOTE — CARE COORDINATION
[facility] Update Call    9/15/2022    Patient: Paloma Damon Patient : 1950   MRN: [id number]  Reason for Admission: inability to walk  Discharge Date: 22 RARS: Readmission Risk Score: 12.6     Patient continues in short term rehab. CTN to follow up next week for tentative d/c dates.     Care Transitions Post Acute Facility Update    Care Transitions Interventions  Post Acute Facility Update

## 2022-09-22 ENCOUNTER — CARE COORDINATION (OUTPATIENT)
Dept: CARE COORDINATION | Facility: CLINIC | Age: 72
End: 2022-09-22

## 2022-09-22 NOTE — CARE COORDINATION
[facility] Update Call    2022    Patient: Gerardo Ramires Patient : 1950   MRN: [id number]  Reason for Admission: inability to walk  Discharge Date: 22 RARS: Readmission Risk Score: 12.6    Patient to d/c today from short term rehab and to transfer to [facility] for in house therapy and providers. CTN to resolve TEREZA episode at this time.      Care Transitions Post Acute Facility Update    Care Transitions Interventions  Post Acute Facility Update

## 2022-10-07 ENCOUNTER — APPOINTMENT (OUTPATIENT)
Dept: ULTRASOUND IMAGING | Age: 72
DRG: 872 | End: 2022-10-07
Payer: MEDICARE

## 2022-10-07 ENCOUNTER — APPOINTMENT (OUTPATIENT)
Dept: GENERAL RADIOLOGY | Age: 72
DRG: 872 | End: 2022-10-07
Payer: MEDICARE

## 2022-10-07 ENCOUNTER — HOSPITAL ENCOUNTER (INPATIENT)
Age: 72
LOS: 6 days | Discharge: HOME | DRG: 872 | End: 2022-10-13
Attending: GENERAL PRACTICE | Admitting: FAMILY MEDICINE
Payer: MEDICARE

## 2022-10-07 DIAGNOSIS — A41.9 ACUTE SEPSIS (HCC): Primary | ICD-10-CM

## 2022-10-07 DIAGNOSIS — I48.91 ATRIAL FIBRILLATION WITH RAPID VENTRICULAR RESPONSE (HCC): ICD-10-CM

## 2022-10-07 DIAGNOSIS — E87.0 HYPERNATREMIA: ICD-10-CM

## 2022-10-07 DIAGNOSIS — R31.9 URINARY TRACT INFECTION WITH HEMATURIA, SITE UNSPECIFIED: ICD-10-CM

## 2022-10-07 DIAGNOSIS — N39.0 URINARY TRACT INFECTION WITH HEMATURIA, SITE UNSPECIFIED: ICD-10-CM

## 2022-10-07 DIAGNOSIS — N17.9 ACUTE KIDNEY INJURY (HCC): ICD-10-CM

## 2022-10-07 PROBLEM — R74.01 TRANSAMINITIS: Status: ACTIVE | Noted: 2022-10-07

## 2022-10-07 PROBLEM — R65.20 SEVERE SEPSIS (HCC): Status: ACTIVE | Noted: 2022-10-07

## 2022-10-07 PROBLEM — E87.20 METABOLIC ACIDOSIS: Status: ACTIVE | Noted: 2022-01-01

## 2022-10-07 LAB
ALBUMIN SERPL-MCNC: 2.9 G/DL (ref 3.2–4.6)
ALBUMIN/GLOB SERPL: 0.8 {RATIO} (ref 1.2–3.5)
ALP SERPL-CCNC: 99 U/L (ref 50–136)
ALT SERPL-CCNC: 129 U/L (ref 12–65)
ANION GAP SERPL CALC-SCNC: 10 MMOL/L (ref 4–13)
ANION GAP SERPL CALC-SCNC: 14 MMOL/L (ref 4–13)
ANION GAP SERPL CALC-SCNC: 15 MMOL/L (ref 4–13)
APPEARANCE UR: ABNORMAL
APTT PPP: >200 SEC (ref 24.1–35.1)
AST SERPL-CCNC: 178 U/L (ref 15–37)
B PERT DNA SPEC QL NAA+PROBE: NOT DETECTED
BACTERIA URNS QL MICRO: ABNORMAL /HPF
BASOPHILS # BLD: 0 K/UL (ref 0–0.2)
BASOPHILS NFR BLD: 0 % (ref 0–2)
BILIRUB SERPL-MCNC: 1.1 MG/DL (ref 0.2–1.1)
BILIRUB UR QL: ABNORMAL
BORDETELLA PARAPERTUSSIS BY PCR: NOT DETECTED
BUN SERPL-MCNC: 53 MG/DL (ref 8–23)
BUN SERPL-MCNC: 62 MG/DL (ref 8–23)
BUN SERPL-MCNC: 62 MG/DL (ref 8–23)
C PNEUM DNA SPEC QL NAA+PROBE: NOT DETECTED
CALCIUM SERPL-MCNC: 7.2 MG/DL (ref 8.3–10.4)
CALCIUM SERPL-MCNC: 8.3 MG/DL (ref 8.3–10.4)
CALCIUM SERPL-MCNC: 8.3 MG/DL (ref 8.3–10.4)
CHLORIDE SERPL-SCNC: 125 MMOL/L (ref 101–110)
CHLORIDE SERPL-SCNC: 125 MMOL/L (ref 101–110)
CHLORIDE SERPL-SCNC: 128 MMOL/L (ref 101–110)
CO2 SERPL-SCNC: 11 MMOL/L (ref 21–32)
CO2 SERPL-SCNC: 15 MMOL/L (ref 21–32)
CO2 SERPL-SCNC: 17 MMOL/L (ref 21–32)
COLOR UR: ABNORMAL
CREAT SERPL-MCNC: 1.7 MG/DL (ref 0.6–1)
CREAT SERPL-MCNC: 1.8 MG/DL (ref 0.6–1)
CREAT SERPL-MCNC: 1.9 MG/DL (ref 0.6–1)
DIFFERENTIAL METHOD BLD: ABNORMAL
EOSINOPHIL # BLD: 0 K/UL (ref 0–0.8)
EOSINOPHIL NFR BLD: 0 % (ref 0.5–7.8)
ERYTHROCYTE [DISTWIDTH] IN BLOOD BY AUTOMATED COUNT: 18.6 % (ref 11.9–14.6)
FLUAV SUBTYP SPEC NAA+PROBE: NOT DETECTED
FLUBV RNA SPEC QL NAA+PROBE: NOT DETECTED
GLOBULIN SER CALC-MCNC: 3.5 G/DL (ref 2.3–3.5)
GLUCOSE BLD STRIP.AUTO-MCNC: 126 MG/DL (ref 65–100)
GLUCOSE SERPL-MCNC: 148 MG/DL (ref 65–100)
GLUCOSE SERPL-MCNC: 175 MG/DL (ref 65–100)
GLUCOSE SERPL-MCNC: 187 MG/DL (ref 65–100)
GLUCOSE UR STRIP.AUTO-MCNC: NEGATIVE MG/DL
HADV DNA SPEC QL NAA+PROBE: NOT DETECTED
HCOV 229E RNA SPEC QL NAA+PROBE: NOT DETECTED
HCOV HKU1 RNA SPEC QL NAA+PROBE: NOT DETECTED
HCOV NL63 RNA SPEC QL NAA+PROBE: NOT DETECTED
HCOV OC43 RNA SPEC QL NAA+PROBE: NOT DETECTED
HCT VFR BLD AUTO: 53.1 % (ref 35.8–46.3)
HGB BLD-MCNC: 16.2 G/DL (ref 11.7–15.4)
HGB UR QL STRIP: ABNORMAL
HMPV RNA SPEC QL NAA+PROBE: NOT DETECTED
HPIV1 RNA SPEC QL NAA+PROBE: NOT DETECTED
HPIV2 RNA SPEC QL NAA+PROBE: NOT DETECTED
HPIV3 RNA SPEC QL NAA+PROBE: NOT DETECTED
HPIV4 RNA SPEC QL NAA+PROBE: NOT DETECTED
IMM GRANULOCYTES # BLD AUTO: 0.1 K/UL (ref 0–0.5)
IMM GRANULOCYTES NFR BLD AUTO: 1 % (ref 0–5)
INR PPP: >16.2
KETONES UR QL STRIP.AUTO: ABNORMAL MG/DL
LACTATE SERPL-SCNC: 2.1 MMOL/L (ref 0.4–2)
LACTATE SERPL-SCNC: 4.2 MMOL/L (ref 0.4–2)
LACTATE SERPL-SCNC: 5.7 MMOL/L (ref 0.4–2)
LEUKOCYTE ESTERASE UR QL STRIP.AUTO: ABNORMAL
LYMPHOCYTES # BLD: 1.4 K/UL (ref 0.5–4.6)
LYMPHOCYTES NFR BLD: 8 % (ref 13–44)
M PNEUMO DNA SPEC QL NAA+PROBE: NOT DETECTED
MCH RBC QN AUTO: 29.1 PG (ref 26.1–32.9)
MCHC RBC AUTO-ENTMCNC: 30.5 G/DL (ref 31.4–35)
MCV RBC AUTO: 95.3 FL (ref 79.6–97.8)
MONOCYTES # BLD: 1.1 K/UL (ref 0.1–1.3)
MONOCYTES NFR BLD: 6 % (ref 4–12)
NEUTS SEG # BLD: 15.1 K/UL (ref 1.7–8.2)
NEUTS SEG NFR BLD: 85 % (ref 43–78)
NITRITE UR QL STRIP.AUTO: POSITIVE
NRBC # BLD: 0.08 K/UL (ref 0–0.2)
OTHER OBSERVATIONS: ABNORMAL
PH UR STRIP: 5 [PH] (ref 5–9)
PLATELET # BLD AUTO: 175 K/UL (ref 150–450)
PMV BLD AUTO: 10.2 FL (ref 9.4–12.3)
POTASSIUM SERPL-SCNC: 3.9 MMOL/L (ref 3.5–5.1)
POTASSIUM SERPL-SCNC: 4.5 MMOL/L (ref 3.5–5.1)
POTASSIUM SERPL-SCNC: 5 MMOL/L (ref 3.5–5.1)
PROCALCITONIN SERPL-MCNC: 0.64 NG/ML (ref 0–0.49)
PROT SERPL-MCNC: 6.4 G/DL (ref 6.3–8.2)
PROT UR STRIP-MCNC: 100 MG/DL
PROTHROMBIN TIME: >120 SEC (ref 12.6–14.5)
RBC # BLD AUTO: 5.57 M/UL (ref 4.05–5.2)
RBC #/AREA URNS HPF: >100 /HPF
RSV RNA SPEC QL NAA+PROBE: NOT DETECTED
RV+EV RNA SPEC QL NAA+PROBE: NOT DETECTED
SARS-COV-2 RNA RESP QL NAA+PROBE: NOT DETECTED
SERVICE CMNT-IMP: ABNORMAL
SODIUM SERPL-SCNC: 152 MMOL/L (ref 136–145)
SODIUM SERPL-SCNC: 153 MMOL/L (ref 136–145)
SODIUM SERPL-SCNC: 155 MMOL/L (ref 136–145)
SP GR UR REFRACTOMETRY: 1.03 (ref 1–1.02)
UFH PPP CHRO-ACNC: >1.1 IU/ML (ref 0.3–0.7)
UROBILINOGEN UR QL STRIP.AUTO: 1 EU/DL (ref 0.2–1)
WBC # BLD AUTO: 17.8 K/UL (ref 4.3–11.1)
WBC URNS QL MICRO: >100 /HPF
YEAST URNS QL MICRO: ABNORMAL

## 2022-10-07 PROCEDURE — 99223 1ST HOSP IP/OBS HIGH 75: CPT | Performed by: INTERNAL MEDICINE

## 2022-10-07 PROCEDURE — 2580000003 HC RX 258: Performed by: FAMILY MEDICINE

## 2022-10-07 PROCEDURE — 87040 BLOOD CULTURE FOR BACTERIA: CPT

## 2022-10-07 PROCEDURE — 85520 HEPARIN ASSAY: CPT

## 2022-10-07 PROCEDURE — 94760 N-INVAS EAR/PLS OXIMETRY 1: CPT

## 2022-10-07 PROCEDURE — 82962 GLUCOSE BLOOD TEST: CPT

## 2022-10-07 PROCEDURE — 85730 THROMBOPLASTIN TIME PARTIAL: CPT

## 2022-10-07 PROCEDURE — 71045 X-RAY EXAM CHEST 1 VIEW: CPT

## 2022-10-07 PROCEDURE — 6360000002 HC RX W HCPCS: Performed by: FAMILY MEDICINE

## 2022-10-07 PROCEDURE — 96374 THER/PROPH/DIAG INJ IV PUSH: CPT

## 2022-10-07 PROCEDURE — 83605 ASSAY OF LACTIC ACID: CPT

## 2022-10-07 PROCEDURE — 36415 COLL VENOUS BLD VENIPUNCTURE: CPT

## 2022-10-07 PROCEDURE — 93005 ELECTROCARDIOGRAM TRACING: CPT | Performed by: GENERAL PRACTICE

## 2022-10-07 PROCEDURE — 2500000003 HC RX 250 WO HCPCS: Performed by: FAMILY MEDICINE

## 2022-10-07 PROCEDURE — 84145 PROCALCITONIN (PCT): CPT

## 2022-10-07 PROCEDURE — 99285 EMERGENCY DEPT VISIT HI MDM: CPT

## 2022-10-07 PROCEDURE — 2500000003 HC RX 250 WO HCPCS: Performed by: GENERAL PRACTICE

## 2022-10-07 PROCEDURE — 81001 URINALYSIS AUTO W/SCOPE: CPT

## 2022-10-07 PROCEDURE — 85610 PROTHROMBIN TIME: CPT

## 2022-10-07 PROCEDURE — 87086 URINE CULTURE/COLONY COUNT: CPT

## 2022-10-07 PROCEDURE — 6360000002 HC RX W HCPCS: Performed by: GENERAL PRACTICE

## 2022-10-07 PROCEDURE — 96365 THER/PROPH/DIAG IV INF INIT: CPT

## 2022-10-07 PROCEDURE — 2700000000 HC OXYGEN THERAPY PER DAY

## 2022-10-07 PROCEDURE — 80053 COMPREHEN METABOLIC PANEL: CPT

## 2022-10-07 PROCEDURE — 80048 BASIC METABOLIC PNL TOTAL CA: CPT

## 2022-10-07 PROCEDURE — 96361 HYDRATE IV INFUSION ADD-ON: CPT

## 2022-10-07 PROCEDURE — 6370000000 HC RX 637 (ALT 250 FOR IP): Performed by: FAMILY MEDICINE

## 2022-10-07 PROCEDURE — 0202U NFCT DS 22 TRGT SARS-COV-2: CPT

## 2022-10-07 PROCEDURE — 2580000003 HC RX 258: Performed by: GENERAL PRACTICE

## 2022-10-07 PROCEDURE — 1100000003 HC PRIVATE W/ TELEMETRY

## 2022-10-07 PROCEDURE — 85025 COMPLETE CBC W/AUTO DIFF WBC: CPT

## 2022-10-07 RX ORDER — ENOXAPARIN SODIUM 100 MG/ML
30 INJECTION SUBCUTANEOUS 2 TIMES DAILY
Status: DISCONTINUED | OUTPATIENT
Start: 2022-10-07 | End: 2022-10-07

## 2022-10-07 RX ORDER — DILTIAZEM HYDROCHLORIDE 5 MG/ML
10 INJECTION INTRAVENOUS ONCE
Status: COMPLETED | OUTPATIENT
Start: 2022-10-07 | End: 2022-10-07

## 2022-10-07 RX ORDER — 0.9 % SODIUM CHLORIDE 0.9 %
1000 INTRAVENOUS SOLUTION INTRAVENOUS ONCE
Status: DISCONTINUED | OUTPATIENT
Start: 2022-10-07 | End: 2022-10-07

## 2022-10-07 RX ORDER — DIMETHICONE, CAMPHOR (SYNTHETIC), MENTHOL, AND PHENOL 1.1; .5; .625; .5 G/100G; G/100G; G/100G; G/100G
OINTMENT TOPICAL
Status: COMPLETED | OUTPATIENT
Start: 2022-10-07 | End: 2022-10-07

## 2022-10-07 RX ORDER — 0.9 % SODIUM CHLORIDE 0.9 %
30 INTRAVENOUS SOLUTION INTRAVENOUS ONCE
Status: COMPLETED | OUTPATIENT
Start: 2022-10-07 | End: 2022-10-07

## 2022-10-07 RX ORDER — ACETAMINOPHEN 650 MG/1
650 SUPPOSITORY RECTAL EVERY 6 HOURS PRN
Status: DISCONTINUED | OUTPATIENT
Start: 2022-10-07 | End: 2022-10-13 | Stop reason: HOSPADM

## 2022-10-07 RX ORDER — 0.9 % SODIUM CHLORIDE 0.9 %
1000 INTRAVENOUS SOLUTION INTRAVENOUS ONCE
Status: COMPLETED | OUTPATIENT
Start: 2022-10-07 | End: 2022-10-07

## 2022-10-07 RX ORDER — DILTIAZEM HYDROCHLORIDE 5 MG/ML
INJECTION INTRAVENOUS
Status: DISPENSED
Start: 2022-10-07 | End: 2022-10-07

## 2022-10-07 RX ORDER — HEPARIN SODIUM 10000 [USP'U]/100ML
5-30 INJECTION, SOLUTION INTRAVENOUS CONTINUOUS
Status: DISCONTINUED | OUTPATIENT
Start: 2022-10-07 | End: 2022-10-08

## 2022-10-07 RX ORDER — 0.9 % SODIUM CHLORIDE 0.9 %
500 INTRAVENOUS SOLUTION INTRAVENOUS
Status: COMPLETED | OUTPATIENT
Start: 2022-10-07 | End: 2022-10-07

## 2022-10-07 RX ORDER — DILTIAZEM HYDROCHLORIDE 5 MG/ML
20 INJECTION INTRAVENOUS
Status: COMPLETED | OUTPATIENT
Start: 2022-10-07 | End: 2022-10-07

## 2022-10-07 RX ORDER — SODIUM CHLORIDE 0.9 % (FLUSH) 0.9 %
5-40 SYRINGE (ML) INJECTION PRN
Status: DISCONTINUED | OUTPATIENT
Start: 2022-10-07 | End: 2022-10-13 | Stop reason: HOSPADM

## 2022-10-07 RX ORDER — ACETAMINOPHEN 325 MG/1
650 TABLET ORAL EVERY 6 HOURS PRN
Status: DISCONTINUED | OUTPATIENT
Start: 2022-10-07 | End: 2022-10-13 | Stop reason: HOSPADM

## 2022-10-07 RX ORDER — METOPROLOL SUCCINATE 50 MG/1
25 TABLET, EXTENDED RELEASE ORAL DAILY
Status: DISCONTINUED | OUTPATIENT
Start: 2022-10-08 | End: 2022-10-08

## 2022-10-07 RX ORDER — SODIUM CHLORIDE 9 MG/ML
INJECTION, SOLUTION INTRAVENOUS PRN
Status: DISCONTINUED | OUTPATIENT
Start: 2022-10-07 | End: 2022-10-13 | Stop reason: HOSPADM

## 2022-10-07 RX ORDER — SODIUM CHLORIDE 0.9 % (FLUSH) 0.9 %
5-40 SYRINGE (ML) INJECTION EVERY 12 HOURS SCHEDULED
Status: DISCONTINUED | OUTPATIENT
Start: 2022-10-07 | End: 2022-10-13 | Stop reason: HOSPADM

## 2022-10-07 RX ORDER — MEMANTINE HYDROCHLORIDE 5 MG/1
10 TABLET ORAL 2 TIMES DAILY
Status: DISCONTINUED | OUTPATIENT
Start: 2022-10-07 | End: 2022-10-13 | Stop reason: HOSPADM

## 2022-10-07 RX ORDER — HEPARIN SODIUM 1000 [USP'U]/ML
2000 INJECTION, SOLUTION INTRAVENOUS; SUBCUTANEOUS PRN
Status: DISCONTINUED | OUTPATIENT
Start: 2022-10-07 | End: 2022-10-09

## 2022-10-07 RX ORDER — POLYETHYLENE GLYCOL 3350 17 G/17G
17 POWDER, FOR SOLUTION ORAL DAILY PRN
Status: DISCONTINUED | OUTPATIENT
Start: 2022-10-07 | End: 2022-10-13 | Stop reason: HOSPADM

## 2022-10-07 RX ORDER — INSULIN LISPRO 100 [IU]/ML
0-8 INJECTION, SOLUTION INTRAVENOUS; SUBCUTANEOUS
Status: DISCONTINUED | OUTPATIENT
Start: 2022-10-07 | End: 2022-10-11

## 2022-10-07 RX ORDER — LEVOTHYROXINE SODIUM 0.05 MG/1
75 TABLET ORAL DAILY
Status: DISCONTINUED | OUTPATIENT
Start: 2022-10-08 | End: 2022-10-13 | Stop reason: HOSPADM

## 2022-10-07 RX ORDER — INSULIN LISPRO 100 [IU]/ML
0-4 INJECTION, SOLUTION INTRAVENOUS; SUBCUTANEOUS NIGHTLY
Status: DISCONTINUED | OUTPATIENT
Start: 2022-10-07 | End: 2022-10-11

## 2022-10-07 RX ORDER — DEXTROSE MONOHYDRATE 50 MG/ML
INJECTION, SOLUTION INTRAVENOUS CONTINUOUS
Status: DISCONTINUED | OUTPATIENT
Start: 2022-10-07 | End: 2022-10-10

## 2022-10-07 RX ORDER — HEPARIN SODIUM 1000 [USP'U]/ML
4000 INJECTION, SOLUTION INTRAVENOUS; SUBCUTANEOUS PRN
Status: DISCONTINUED | OUTPATIENT
Start: 2022-10-07 | End: 2022-10-09

## 2022-10-07 RX ORDER — HEPARIN SODIUM 1000 [USP'U]/ML
4000 INJECTION, SOLUTION INTRAVENOUS; SUBCUTANEOUS ONCE
Status: COMPLETED | OUTPATIENT
Start: 2022-10-07 | End: 2022-10-07

## 2022-10-07 RX ORDER — DEXTROSE MONOHYDRATE 100 MG/ML
INJECTION, SOLUTION INTRAVENOUS CONTINUOUS PRN
Status: DISCONTINUED | OUTPATIENT
Start: 2022-10-07 | End: 2022-10-13 | Stop reason: HOSPADM

## 2022-10-07 RX ADMIN — HEPARIN SODIUM 8 UNITS/KG/HR: 10000 INJECTION, SOLUTION INTRAVENOUS at 15:03

## 2022-10-07 RX ADMIN — SODIUM CHLORIDE 15 MG/HR: 900 INJECTION, SOLUTION INTRAVENOUS at 21:59

## 2022-10-07 RX ADMIN — HEPARIN SODIUM 4000 UNITS: 1000 INJECTION INTRAVENOUS; SUBCUTANEOUS at 15:03

## 2022-10-07 RX ADMIN — Medication: at 15:56

## 2022-10-07 RX ADMIN — SODIUM CHLORIDE 1000 ML: 9 INJECTION, SOLUTION INTRAVENOUS at 17:08

## 2022-10-07 RX ADMIN — SODIUM CHLORIDE 500 ML: 9 INJECTION, SOLUTION INTRAVENOUS at 13:26

## 2022-10-07 RX ADMIN — SODIUM CHLORIDE 1000 ML: 9 INJECTION, SOLUTION INTRAVENOUS at 11:05

## 2022-10-07 RX ADMIN — DILTIAZEM HYDROCHLORIDE 10 MG: 5 INJECTION INTRAVENOUS at 14:51

## 2022-10-07 RX ADMIN — SODIUM CHLORIDE 1848 ML: 900 INJECTION, SOLUTION INTRAVENOUS at 11:17

## 2022-10-07 RX ADMIN — ANTI-FUNGAL POWDER MICONAZOLE NITRATE TALC FREE: 1.42 POWDER TOPICAL at 16:44

## 2022-10-07 RX ADMIN — Medication 2500 MG: at 16:44

## 2022-10-07 RX ADMIN — SODIUM CHLORIDE 5 MG/HR: 900 INJECTION, SOLUTION INTRAVENOUS at 14:51

## 2022-10-07 RX ADMIN — DILTIAZEM HYDROCHLORIDE 20 MG: 5 INJECTION, SOLUTION INTRAVENOUS at 11:03

## 2022-10-07 RX ADMIN — SODIUM CHLORIDE, PRESERVATIVE FREE 10 ML: 5 INJECTION INTRAVENOUS at 22:19

## 2022-10-07 RX ADMIN — ANTI-FUNGAL POWDER MICONAZOLE NITRATE TALC FREE: 1.42 POWDER TOPICAL at 22:19

## 2022-10-07 RX ADMIN — CEFTRIAXONE 1000 MG: 1 INJECTION, POWDER, FOR SOLUTION INTRAMUSCULAR; INTRAVENOUS at 11:40

## 2022-10-07 RX ADMIN — MEMANTINE 10 MG: 5 TABLET ORAL at 21:58

## 2022-10-07 ASSESSMENT — PAIN - FUNCTIONAL ASSESSMENT: PAIN_FUNCTIONAL_ASSESSMENT: 0-10

## 2022-10-07 ASSESSMENT — PAIN SCALES - GENERAL
PAINLEVEL_OUTOF10: 0
PAINLEVEL_OUTOF10: 0

## 2022-10-07 NOTE — H&P
[facility] Cardiology Initial Cardiac Evaluation                Date of  Admission: 10/7/2022 10:59 AM     PCP: Sandra Travis, VIJAYA - CNP  Requested by: Dr Michael Hernández  Primary Cardiologist: Dr Kayla Laura Attending: Dr Reginald Hayden    Reason for Evaluation: A Fib RVR      Asif Contreras is a 67 y.o. female with hx of PAF felt to be provoke not on [facility], uterine mass, thyroid disease, menopause, DM, dementia with memory loss, and colon cancer s/p colectomy in 1999. The patient was at her nursing home where she was found to be diaphoretic, febrile with temp of 102 and elevated HR reported in the 200's. The patient was found to be in A Fib with RVR with HR of 200 and the patient was given IV fluids and 20 mg of diltiazem in the field. The patients HR came down to 150-180s on arrival with no initial complaints herself to the ED staff. The patient was noted to have a positive UA with +4 bacteria, +100 WBC, and large Leukocytes. Negative respiratory viral panel, WBC count of 17.8, elevated LFT's, lactic 5.7 then 4.2, , Cr 1.9 from a base of 0.8 and BGL of 187. The patient has family at bedside but is confused herself with no complaints. The patient is still running 130 bmp with A Fib on a heparin drip and Cardizem drip at 5 mg/hr after receiving 2500 ml of fluid.       Past Medical History:   Diagnosis Date    Atrial fibrillation ([address]     Colon cancer ([facility] [address] 1999    Dementia ([facility] [address]     with memory loss    Diabetes ([facility] [address]     Menopause     Thyroid disease     Uterine mass       Past Surgical History:   Procedure Laterality Date    TOTAL COLECTOMY  1999     No Known Allergies   Family History   Problem Relation Age of Onset    Diabetes Mother     Diabetes Father       Social History       Tobacco History       Smoking Status  Never      Smokeless Tobacco Use  Never              Alcohol History       Alcohol Use Status  No              Drug Use       Drug Use Status  No              Sexual Activity       Sexually Active  Not Asked                     Not in a hospital admission.      Current Facility-Administered Medications   Medication Dose Route Frequency    [START ON 10/8/2022] levothyroxine (SYNTHROID) tablet 75 mcg  75 mcg Oral Daily    memantine (NAMENDA) tablet 10 mg  10 mg Oral BID    [START ON 10/8/2022] metoprolol succinate (TOPROL XL) extended release tablet 25 mg  25 mg Oral Daily    miconazole (MICOTIN) 2 % powder   Topical BID    insulin lispro (HUMALOG) injection vial 0-8 Units  0-8 Units SubCUTAneous TID WC    insulin lispro (HUMALOG) injection vial 0-4 Units  0-4 Units SubCUTAneous Nightly    glucose chewable tablet 16 g  4 tablet Oral PRN    dextrose bolus 10% 125 mL  125 mL IntraVENous PRN    Or    dextrose bolus 10% 250 mL  250 mL IntraVENous PRN    glucagon (rDNA) injection 1 mg  1 mg SubCUTAneous PRN    dextrose 10 % infusion   IntraVENous Continuous PRN    tuberculin injection 5 Units  5 Units IntraDERmal Once    sodium chloride flush 0.9 % injection 5-40 mL  5-40 mL IntraVENous 2 times per day    sodium chloride flush 0.9 % injection 5-40 mL  5-40 mL IntraVENous PRN    0.9 % sodium chloride infusion   IntraVENous PRN    polyethylene glycol (GLYCOLAX) packet 17 g  17 g Oral Daily PRN    acetaminophen (TYLENOL) tablet 650 mg  650 mg Oral Q6H PRN    Or    acetaminophen (TYLENOL) suppository 650 mg  650 mg Rectal Q6H PRN    dilTIAZem 100 mg in sodium chloride 0.9 % 100 mL infusion ([facility])  2.5-15 mg/hr IntraVENous Continuous    dextrose 5 % solution   IntraVENous Continuous    heparin (porcine) injection 4,000 Units  4,000 Units IntraVENous Once    heparin (porcine) injection 4,000 Units  4,000 Units IntraVENous PRN    heparin (porcine) injection 2,000 Units  2,000 Units IntraVENous PRN    heparin 25,000 units in dextrose 5% 250 mL (premix) infusion  5-30 Units/kg/hr IntraVENous Continuous    [START ON 10/8/2022] cefTRIAXone (ROCEPHIN) 1,000 mg in sodium chloride 0.9 % 50 mL IVPB mini-bag  1,000 mg IntraVENous Q24H    vancomycin (VANCOCIN) 2500 mg in sodium chloride 0.9 % 500 mL IVPB  2,500 mg IntraVENous Once    vancomycin (VANCOCIN) intermittent dosing (placeholder)   Other RX Placeholder     Current Outpatient Medications   Medication Sig    FREESTYLE LITE strip USE TO CHECK BLOOD GLUCOSE ONCE DAILY    FreeStyle Lancets MISC USE TO CHECK BLOOD GLUCOSE ONCE DAILY    acetaminophen (TYLENOL) 325 MG tablet Take 650 mg by mouth every 4 hours as needed    ascorbic acid (VITAMIN C) 250 MG tablet Take 250 mg by mouth daily    atorvastatin (LIPITOR) 40 MG tablet TAKE 1 TABLET DAILY    glyBURIDE-metFORMIN (GLUCOVANCE) 2.5-500 MG per tablet TAKE 2 TABLETS TWICE A DAY WITH MEALS    levothyroxine (SYNTHROID) 75 MCG tablet TAKE 1 TABLET DAILY BEFORE BREAKFAST    memantine (NAMENDA) 10 MG tablet TAKE 1 TABLET TWICE A DAY    metoprolol succinate (TOPROL XL) 25 MG extended release tablet Take 25 mg by mouth daily    nystatin (MYCOSTATIN) 512498 UNIT/GM powder Apply powder to skin folds between breasts and abdomen 4 times daily    SITagliptin (JANUVIA) 100 MG tablet Take 100 mg by mouth daily       Review of Systems   Unable to perform ROS: Dementia      Physical Exam  Vitals:    10/07/22 1216 10/07/22 1230 10/07/22 1300 10/07/22 1415   BP:  111/73 122/83 (!) 118/91   Pulse: (!) 136 (!) 135 (!) 146 (!) 139   Resp: 19 20 22 21   Temp:   98.6 °F (37 °C)    TempSrc:   Oral    SpO2: 95% 98% 98% 98%   Weight:       Height:           Patient Vitals for the past 96 hrs (Last 3 readings):   Weight   10/07/22 1058 250 lb (113.4 kg)         Intake/Output Summary (Last 24 hours) at 10/7/2022 1505  Last data filed at 10/7/2022 1135  Gross per 24 hour   Intake --   Output 200 ml   Net -200 ml       Net IO Since Admission: -200 mL [10/07/22 1505]      Physical Exam:  General: Well Developed, Well Nourished, Appears ill  HEENT: pupils equal and round, no abnormalities noted  Neck: supple, no JVD, no carotid bruits  Heart: S1S2 with rapid and irregular without murmurs or gallops  Lungs: Clear throughout auscultation bilaterally without adventitious sounds  Abd: soft, nontender, nondistended, with good bowel sounds  Ext: warm, no edema, calves supple/nontender, pulses 2+ bilaterally  Skin: cool, moist  Psychiatric: Normal mood and affect  Neurologic: Alert not orriented      Recent Results (from the past 24 hour(s))   Lactic Acid    Collection Time: 10/07/22 11:14 AM   Result Value Ref Range    Lactic Acid, Plasma 5.7 (HH) 0.4 - 2.0 MMOL/L   CBC with Auto Differential    Collection Time: 10/07/22 11:14 AM   Result Value Ref Range    WBC 17.8 (H) 4.3 - 11.1 K/uL    RBC 5.57 (H) 4.05 - 5.2 M/uL    Hemoglobin 16.2 (H) 11.7 - 15.4 g/dL    Hematocrit 53.1 (H) 35.8 - 46.3 %    MCV 95.3 79.6 - 97.8 FL    MCH 29.1 26.1 - 32.9 PG    MCHC 30.5 (L) 31.4 - 35.0 g/dL    RDW 18.6 (H) 11.9 - 14.6 %    Platelets 868 978 - 613 K/uL    MPV 10.2 9.4 - 12.3 FL    nRBC 0.08 0.0 - 0.2 K/uL    Differential Type AUTOMATED      Seg Neutrophils 85 (H) 43 - 78 %    Lymphocytes 8 (L) 13 - 44 %    Monocytes 6 4.0 - 12.0 %    Eosinophils % 0 (L) 0.5 - 7.8 %    Basophils 0 0.0 - 2.0 %    Immature Granulocytes 1 0.0 - 5.0 %    Segs Absolute 15.1 (H) 1.7 - 8.2 K/UL    Absolute Lymph # 1.4 0.5 - 4.6 K/UL    Absolute Mono # 1.1 0.1 - 1.3 K/UL    Absolute Eos # 0.0 0.0 - 0.8 K/UL    Basophils Absolute 0.0 0.0 - 0.2 K/UL    Absolute Immature Granulocyte 0.1 0.0 - 0.5 K/UL   CMP    Collection Time: 10/07/22 11:14 AM   Result Value Ref Range    Sodium 155 (H) 136 - 145 mmol/L    Potassium 3.9 3.5 - 5.1 mmol/L    Chloride 125 (H) 101 - 110 mmol/L    CO2 15 (L) 21 - 32 mmol/L    Anion Gap 15 (H) 4 - 13 mmol/L    Glucose 187 (H) 65 - 100 mg/dL    BUN 53 (H) 8 - 23 MG/DL    Creatinine 1.90 (H) 0.6 - 1.0 MG/DL    Est, Glom Filt Rate 28 (L) >60 ml/min/1.73m2    Calcium 7.2 (L) 8.3 - 10.4 MG/DL    Total Bilirubin 1.1 0.2 - 1.1 MG/DL     (H) 12 - 65 U/L     (H) 15 - 37 U/L    Alk Phosphatase 99 50 - 136 U/L    Total Protein 6.4 6.3 - 8.2 g/dL    Albumin 2.9 (L) 3.2 - 4.6 g/dL    Globulin 3.5 2.3 - 3.5 g/dL    Albumin/Globulin Ratio 0.8 (L) 1.2 - 3.5     Procalcitonin    Collection Time: 10/07/22 11:14 AM   Result Value Ref Range    Procalcitonin 0.64 (H) 0.00 - 0.49 ng/mL   Urinalysis    Collection Time: 10/07/22 11:33 AM   Result Value Ref Range    Color, UA ORANGE      Appearance TURBID      Specific Gravity, UA 1.028 (H) 1.001 - 1.023      pH, Urine 5.0 5.0 - 9.0      Protein,  (A) NEG mg/dL    Glucose, UA Negative mg/dL    Ketones, Urine TRACE (A) NEG mg/dL    Bilirubin Urine MODERATE (A) NEG      Blood, Urine LARGE (A) NEG      Urobilinogen, Urine 1.0 0.2 - 1.0 EU/dL    Nitrite, Urine Positive (A) NEG      Leukocyte Esterase, Urine LARGE (A) NEG      WBC, UA >100 0 /hpf    RBC, UA >100 0 /hpf    BACTERIA, URINE 4+ (H) 0 /hpf    Yeast, UA OCCASIONAL      OTHER OBSERVATIONS RESULTS VERIFIED MANUALLY     Respiratory Panel, Molecular, with COVID-19 (Restricted: peds pts or suitable admitted adults)    Collection Time: 10/07/22 11:33 AM    Specimen: Nasopharyngeal   Result Value Ref Range    Adenovirus by PCR NOT DETECTED NOTDET      Coronavirus 229E by PCR NOT DETECTED NOTDET      Coronavirus HKU1 by PCR NOT DETECTED NOTDET      Coronavirus NL63 by PCR NOT DETECTED NOTDET      Coronavirus OC43 by PCR NOT DETECTED NOTDET      SARS-CoV-2, PCR NOT DETECTED NOTDET      Human Metapneumovirus by PCR NOT DETECTED NOTDET      Rhinovirus Enterovirus PCR NOT DETECTED NOTDET      Influenza A by PCR NOT DETECTED NOTDET      Influenza B PCR NOT DETECTED NOTDET      Parainfluenza 1 PCR NOT DETECTED NOTDET      Parainfluenza 2 PCR NOT DETECTED NOTDET      Parainfluenza 3 PCR NOT DETECTED NOTDET      Parainfluenza 4 PCR NOT DETECTED NOTDET      Respiratory Syncytial Virus by PCR NOT DETECTED NOTDET      Bordetella parapertussis by PCR NOT DETECTED NOTDET      Bordetella pertussis by PCR NOT DETECTED NOTDET Chlamydophila Pneumonia PCR NOT DETECTED NOTDET      Mycoplasma pneumo by PCR NOT DETECTED NOTDET     Lactic Acid    Collection Time: 10/07/22 12:59 PM   Result Value Ref Range    Lactic Acid, Plasma 4.2 (HH) 0.4 - 2.0 MMOL/L        XR CHEST PORTABLE    Result Date: 10/7/2022  XR CHEST PORTABLE 10/7/2022 11:19 AM HISTORY: Sepsis COMPARISON: None available. A portable AP view of the chest was obtained. The lungs are clear. The heart is normal in size. No pneumothorax. No pleural effusions. Initial Recommendations:      Cardiac Problems:    Sepsis: Per primary Team    PAF:  Pt has hx of PAF that was felt to be provoked and was NSR last time she was in the office off [address] and [address]. Likely currently being driven by urosepsis. No need for Charly at this time. Will need [address] with elevated chads score and will complete a rate control strategy at this time with low likelihood of being able to maintain NSR with active infection. Current BP is currently stable for Cardizem drip. The patient will need to be placed on anticoagulation. Can use heparin in the short term but will need to be on [address] in the outpatient setting. Goal HR less than 110 while at rest and BP greater than 90. Will likely need more fluid resuscitation with sepsis and LACY. LACY: Per primary team pt has received 2.5 L of fluid. Likely will need more for 30 ml/kg for sepsis. UTI: Per primary team.     Dementia: Per Primary Team     Thank you very much for requesting a Cardiology Evaluation. We appreciate the opportunity to participate in this patient's care. We will follow along with above stated plan. This is initial management plan but please see attendings consult note for full plan of care.     Sun Lilly, APRN - CNP AGACNP-BC

## 2022-10-07 NOTE — ED PROVIDER NOTES
Emergency Department Provider Note                   PCP:                VIJAYA Colvin CNP               Age: 67 y.o. Sex: female     No diagnosis found. DISPOSITION          MDM  Number of Diagnoses or Management Options  Acute kidney injury [facility]): new, needed workup  Acute sepsis [facility]): new, needed workup  Atrial fibrillation with rapid ventricular response ([facility] [address]): new, needed workup  Hypernatremia: new, needed workup  Urinary tract infection with hematuria, site unspecified: new, needed workup     Amount and/or Complexity of Data Reviewed  Clinical lab tests: ordered and reviewed  Tests in the radiology section of CPT®: ordered and reviewed  Tests in the medicine section of CPT®: ordered and reviewed  Discussion of test results with the performing providers: no  Decide to obtain previous medical records or to obtain history from someone other than the patient: yes  Obtain history from someone other than the patient: yes  Review and summarize past medical records: yes  Discuss the patient with other providers: yes  Independent visualization of images, tracings, or specimens: yes    Risk of Complications, Morbidity, and/or Mortality  Presenting problems: high  Diagnostic procedures: high  Management options: high  General comments: Patient presented with tachycardia and fever. Patient was found to be in atrial fibrillation with rapid ventricular response. However, I believe this was secondary to the patient's sepsis which is likely due to the urine as the source. Patient was hypernatremic, and had acute kidney injury. I treated the tachycardia with a combination of IV fluid and diltiazem. However, I felt the biggest concern was treating the underlying condition which was the sepsis. Patient did improve with IV fluids and IV antibiotics. Patient's heart rate was in the 140s at the time of admission. Patient's heart rate had been in the 200s prior to arrival here.   Patient's blood pressure remained stable and she never needed pressors. Patient will be admitted to the hospitalist to telemetry. Patient Progress  Patient progress: improved             Orders Placed This Encounter   Procedures    Culture, Blood 1    Culture, Blood 1    COVID-19, Flu A/B, and RSV Combo    XR CHEST PORTABLE    Lactic Acid    Lactic Acid    CBC with Auto Differential    CMP    Procalcitonin    Urinalysis    Cardiac Monitor - ED Only    Straight Cath (Select if patient is unable to provide a sample)    EKG 12 Lead    Saline lock IV        Medications   cefTRIAXone (ROCEPHIN) 1,000 mg in sodium chloride 0.9 % 50 mL IVPB mini-bag (has no administration in time range)   0.9 % sodium chloride bolus (has no administration in time range)   dilTIAZem injection 20 mg (20 mg IntraVENous Given 10/7/22 1103)       New Prescriptions    No medications on file        Lidia Guillen is a 67 y.o. female who presents to the Emergency Department with chief complaint of    Chief Complaint   Patient presents with    Shortness of Breath      Patient presents with fever and A. fib with RVR. Patient was at a nursing home and was found to be diaphoretic. Patient was also found to be febrile at a temperature of 102. They also noted that patient's heart rate was in the 200s with A. fib with RVR. I spoke with medics over the phone and requested they give IV fluid and a dose of 20 mg of diltiazem. Patient's blood pressure at the time was systolic 564. Patient now presents with heart rate still in the 150s to 180s. When I speak to the patient she denies all complaints. Patient is in a nursing home with dementia.   Otherwise history is limited        Review of Systems   Unable to perform ROS: Dementia     Past Medical History:   Diagnosis Date    Atrial fibrillation ([address]     Colon cancer ([address] 1999    Dementia ([address]     with memory loss    Diabetes ([facility] [address]     Menopause     Thyroid disease     Uterine mass         Past Surgical History: Procedure Laterality Date    TOTAL COLECTOMY  1999        Family History   Problem Relation Age of Onset    Diabetes Mother     Diabetes Father         Social History     Socioeconomic History    Marital status:    Tobacco Use    Smoking status: Never    Smokeless tobacco: Never   Substance and Sexual Activity    Alcohol use: No    Drug use: No         Patient has no known allergies. Previous Medications    ACETAMINOPHEN (TYLENOL) 325 MG TABLET    Take 650 mg by mouth every 4 hours as needed    ASCORBIC ACID (VITAMIN C) 250 MG TABLET    Take 250 mg by mouth daily    ATORVASTATIN (LIPITOR) 40 MG TABLET    TAKE 1 TABLET DAILY    FREESTYLE LANCETS MISC    USE TO CHECK BLOOD GLUCOSE ONCE DAILY    FREESTYLE LITE STRIP    USE TO CHECK BLOOD GLUCOSE ONCE DAILY    GLYBURIDE-METFORMIN (GLUCOVANCE) 2.5-500 MG PER TABLET    TAKE 2 TABLETS TWICE A DAY WITH MEALS    LEVOTHYROXINE (SYNTHROID) 75 MCG TABLET    TAKE 1 TABLET DAILY BEFORE BREAKFAST    MEMANTINE (NAMENDA) 10 MG TABLET    TAKE 1 TABLET TWICE A DAY    METOPROLOL SUCCINATE (TOPROL XL) 25 MG EXTENDED RELEASE TABLET    Take 25 mg by mouth daily    NYSTATIN (MYCOSTATIN) 754360 UNIT/GM POWDER    Apply powder to skin folds between breasts and abdomen 4 times daily    SITAGLIPTIN (JANUVIA) 100 MG TABLET    Take 100 mg by mouth daily        Vitals signs and nursing note reviewed. Patient Vitals for the past 4 hrs:   Temp Pulse Resp BP SpO2   10/07/22 1100 98.4 °F (36.9 °C) -- -- -- --   10/07/22 1058 -- (!) 183 (!) 40 132/75 97 %          Physical Exam  Constitutional:       General: She is not in acute distress. Appearance: She is diaphoretic. HENT:      Head: Normocephalic and atraumatic. Nose: Nose normal.      Mouth/Throat:      Mouth: Mucous membranes are moist.   Eyes:      Extraocular Movements: Extraocular movements intact. Pupils: Pupils are equal, round, and reactive to light. Cardiovascular:      Rate and Rhythm: Tachycardia present. Rhythm irregular. Heart sounds: Normal heart sounds. Pulmonary:      Effort: No respiratory distress. Breath sounds: No wheezing. Abdominal:      General: Abdomen is flat. Palpations: Abdomen is soft. Tenderness: There is no abdominal tenderness. Musculoskeletal:         General: No swelling or tenderness. Cervical back: Normal range of motion. Skin:     Findings: No erythema or rash. Neurological:      General: No focal deficit present. Mental Status: She is alert and oriented to person, place, and time.    Psychiatric:         Mood and Affect: Mood normal.         Behavior: Behavior normal.        EKG 12 Lead    Date/Time: 10/7/2022 11:04 AM  Performed by: Elsie Rust DO  Authorized by: Elsie Rust DO     ECG reviewed by ED Physician in the absence of a cardiologist: yes    Interpretation:     Interpretation: abnormal    Rate:     ECG rate:  146    ECG rate assessment: tachycardic    Rhythm:     Rhythm: atrial fibrillation    ST segments:     ST segments:  Non-specific  Critical Care  Performed by: Elsie Rust DO  Authorized by: Michelle Orozco MD     Critical care provider statement:     Critical care time (minutes):  50    Critical care time was exclusive of:  Separately billable procedures and treating other patients    Critical care was necessary to treat or prevent imminent or life-threatening deterioration of the following conditions:  Cardiac failure, circulatory failure, dehydration, metabolic crisis, renal failure and sepsis    Critical care was time spent personally by me on the following activities:  Blood draw for specimens, development of treatment plan with patient or surrogate, evaluation of patient's response to treatment, examination of patient, interpretation of cardiac output measurements, obtaining history from patient or surrogate, ordering and performing treatments and interventions, ordering and review of laboratory studies, ordering and review of radiographic studies, re-evaluation of patient's condition and review of old charts    Care discussed with: admitting provider      No results found for any visits on 10/07/22. XR CHEST PORTABLE    (Results Pending)                       Voice dictation software was used during the making of this note. This software is not perfect and grammatical and other typographical errors may be present. This note has not been completely proofread for errors.      Josiane Ruiz DO  10/08/22 157

## 2022-10-07 NOTE — ED NOTES
Report given to Josiah Mae, receiving RN. Pt awaiting transport with RN dionne.      Dain Guy RN  10/07/22 4468

## 2022-10-07 NOTE — ED NOTES
MD Mendoza notified of HR maintaining 130s-140s despite diltiazem. IVF to be ordered.      Eleuterio Murcia RN  10/07/22 7495

## 2022-10-07 NOTE — ACP (ADVANCE CARE PLANNING)
[facility] Hospitalist [facility]  At the heart of better care     Advance Care Planning   Admit Date:  10/7/2022 10:59 AM   Name:  Dickson Moran   Age:  67 y.o. Sex:  female  :  1950   MRN:  [id number]   [facility]    Dickson Moran is able to make her own decisions:   No    If pt unable to make decisions, POA/surrogate decision maker:  Sister    Other people present:   Sister    Patient / surrogate decision-maker directed code status:  FULL    Patient or surrogate consented to discussion of the current conditions, workup, management plans, prognosis, and the risk for further deterioration. Time spent: 17 minutes in direct discussion.       Signed:  Alin Suarez DO

## 2022-10-07 NOTE — H&P
Hospitalist Admission History and Physical         NAME:            Dotti Bloch    Age:                67 y.o.    :               1950    MRN:              [id number]    PCP: VIJAYA Weir CNP    Consulting MD:    Treatment Team: Attending Provider: Christina Alba DO; Registered Nurse: Cinthya Barry RN         Chief Complaint   Patient presents with    Shortness of Breath   HPI:    Patient is a 67 y.o. female who presented to the ED for cc fevers and afib with RVR. HR noted to be in the 150s-180s on arrival. Given diltiazem bolus. Hx of a fib not on anticoagulation, former colon cancer, dementia, DM type II, and wheel chair found. Currently living at [facility]. Temp 102. 6. . Na 155, CO2 15, creatine 1.9 from baseline 0.8, procal 0.6. Lactic acid 4.2. LFTs elevated. WBC 17.8. Past Medical History:   Diagnosis Date    Atrial fibrillation ([facility]     Colon cancer ([facility] [address]     Dementia ([facility] [address]     with memory loss    Diabetes ([facility] [address]     Menopause     Thyroid disease     Uterine mass             Past Surgical History:   Procedure Laterality Date    TOTAL COLECTOMY              Family History   Problem Relation Age of Onset    Diabetes Mother     Diabetes Father        Family history reviewed and negative except as noted above. Social History     Social History Narrative    Not on file            Social History     Tobacco Use    Smoking status: Never    Smokeless tobacco: Never   Substance Use Topics    Alcohol use: No            Social History     Substance and Sexual Activity   Drug Use No                 No Known Allergies         Prior to Admission medications    Medication Sig Start Date End Date Taking?  Authorizing Provider   FREESTYLE LITE strip USE TO CHECK BLOOD GLUCOSE ONCE DAILY 22   VIJAYA Weir CNP   FreeStyle Lancets MISC USE TO CHECK BLOOD GLUCOSE ONCE DAILY 22   VIJAYA Paula CNP   acetaminophen (TYLENOL) 325 MG tablet Take 650 mg by mouth every 4 hours as needed 6/4/21   Ar Automatic Reconciliation   ascorbic acid (VITAMIN C) 250 MG tablet Take 250 mg by mouth daily 6/4/21   Ar Automatic Reconciliation   atorvastatin (LIPITOR) 40 MG tablet TAKE 1 TABLET DAILY 3/22/22   Ar Automatic Reconciliation   glyBURIDE-metFORMIN (GLUCOVANCE) 2.5-500 MG per tablet TAKE 2 TABLETS TWICE A DAY WITH MEALS 3/22/22   Ar Automatic Reconciliation   levothyroxine (SYNTHROID) 75 MCG tablet TAKE 1 TABLET DAILY BEFORE BREAKFAST 2/24/22   Ar Automatic Reconciliation   memantine (NAMENDA) 10 MG tablet TAKE 1 TABLET TWICE A DAY 3/22/22   Ar Automatic Reconciliation   metoprolol succinate (TOPROL XL) 25 MG extended release tablet Take 25 mg by mouth daily 3/22/22   Ar Automatic Reconciliation   nystatin (MYCOSTATIN) 242566 UNIT/GM powder Apply powder to skin folds between breasts and abdomen 4 times daily 3/22/22   Ar Automatic Reconciliation   SITagliptin (JANUVIA) 100 MG tablet Take 100 mg by mouth daily 9/15/21   Ar Automatic Reconciliation                      Review of Systems    Cannot obtain due to dementia. Objective:         Patient Vitals for the past 24 hrs:   Temp Pulse Resp BP SpO2   10/07/22 1415 -- (!) 139 21 (!) 118/91 98 %   10/07/22 1300 98.6 °F (37 °C) (!) 146 22 122/83 98 %   10/07/22 1230 -- (!) 135 20 111/73 98 %   10/07/22 1216 -- (!) 136 19 -- 95 %   10/07/22 1215 -- -- 23 111/72 98 %   10/07/22 1200 -- (!) 149 27 105/63 97 %   10/07/22 1151 -- (!) 148 25 120/89 97 %   10/07/22 1138 (!) 102.6 °F (39.2 °C) (!) 131 (!) 33 125/83 98 %   10/07/22 1134 -- (!) 174 (!) 36 109/79 --   10/07/22 1130 -- (!) 148 (!) 35 (!) 97/56 97 %   10/07/22 1115 -- (!) 146 (!) 31 -- 97 %   10/07/22 1100 98.4 °F (36.9 °C) (!) 156 (!) 38 -- --   10/07/22 1058 -- (!) 183 (!) 40 132/75 97 %            10/07 0701 - 10/07 1900  In: -   Out: 200 [Urine:200]    No intake/output data recorded.          Data Review:   Recent Results (from the past 24 hour(s))   Lactic Acid    Collection Time: 10/07/22 11:14 AM   Result Value Ref Range    Lactic Acid, Plasma 5.7 (HH) 0.4 - 2.0 MMOL/L   CBC with Auto Differential    Collection Time: 10/07/22 11:14 AM   Result Value Ref Range    WBC 17.8 (H) 4.3 - 11.1 K/uL    RBC 5.57 (H) 4.05 - 5.2 M/uL    Hemoglobin 16.2 (H) 11.7 - 15.4 g/dL    Hematocrit 53.1 (H) 35.8 - 46.3 %    MCV 95.3 79.6 - 97.8 FL    MCH 29.1 26.1 - 32.9 PG    MCHC 30.5 (L) 31.4 - 35.0 g/dL    RDW 18.6 (H) 11.9 - 14.6 %    Platelets 714 643 - 641 K/uL    MPV 10.2 9.4 - 12.3 FL    nRBC 0.08 0.0 - 0.2 K/uL    Differential Type AUTOMATED      Seg Neutrophils 85 (H) 43 - 78 %    Lymphocytes 8 (L) 13 - 44 %    Monocytes 6 4.0 - 12.0 %    Eosinophils % 0 (L) 0.5 - 7.8 %    Basophils 0 0.0 - 2.0 %    Immature Granulocytes 1 0.0 - 5.0 %    Segs Absolute 15.1 (H) 1.7 - 8.2 K/UL    Absolute Lymph # 1.4 0.5 - 4.6 K/UL    Absolute Mono # 1.1 0.1 - 1.3 K/UL    Absolute Eos # 0.0 0.0 - 0.8 K/UL    Basophils Absolute 0.0 0.0 - 0.2 K/UL    Absolute Immature Granulocyte 0.1 0.0 - 0.5 K/UL   CMP    Collection Time: 10/07/22 11:14 AM   Result Value Ref Range    Sodium 155 (H) 136 - 145 mmol/L    Potassium 3.9 3.5 - 5.1 mmol/L    Chloride 125 (H) 101 - 110 mmol/L    CO2 15 (L) 21 - 32 mmol/L    Anion Gap 15 (H) 4 - 13 mmol/L    Glucose 187 (H) 65 - 100 mg/dL    BUN 53 (H) 8 - 23 MG/DL    Creatinine 1.90 (H) 0.6 - 1.0 MG/DL    Est, Glom Filt Rate 28 (L) >60 ml/min/1.73m2    Calcium 7.2 (L) 8.3 - 10.4 MG/DL    Total Bilirubin 1.1 0.2 - 1.1 MG/DL     (H) 12 - 65 U/L     (H) 15 - 37 U/L    Alk Phosphatase 99 50 - 136 U/L    Total Protein 6.4 6.3 - 8.2 g/dL    Albumin 2.9 (L) 3.2 - 4.6 g/dL    Globulin 3.5 2.3 - 3.5 g/dL    Albumin/Globulin Ratio 0.8 (L) 1.2 - 3.5     Procalcitonin    Collection Time: 10/07/22 11:14 AM   Result Value Ref Range    Procalcitonin 0.64 (H) 0.00 - 0.49 ng/mL   Urinalysis    Collection Time: 10/07/22 11:33 AM   Result Value Ref Range Color, UA ORANGE      Appearance TURBID      Specific Gravity, UA 1.028 (H) 1.001 - 1.023      pH, Urine 5.0 5.0 - 9.0      Protein,  (A) NEG mg/dL    Glucose, UA Negative mg/dL    Ketones, Urine TRACE (A) NEG mg/dL    Bilirubin Urine MODERATE (A) NEG      Blood, Urine LARGE (A) NEG      Urobilinogen, Urine 1.0 0.2 - 1.0 EU/dL    Nitrite, Urine Positive (A) NEG      Leukocyte Esterase, Urine LARGE (A) NEG      WBC, UA >100 0 /hpf    RBC, UA >100 0 /hpf    BACTERIA, URINE 4+ (H) 0 /hpf    Yeast, UA OCCASIONAL      OTHER OBSERVATIONS RESULTS VERIFIED MANUALLY     Respiratory Panel, Molecular, with COVID-19 (Restricted: peds pts or suitable admitted adults)    Collection Time: 10/07/22 11:33 AM    Specimen: Nasopharyngeal   Result Value Ref Range    Adenovirus by PCR NOT DETECTED NOTDET      Coronavirus 229E by PCR NOT DETECTED NOTDET      Coronavirus HKU1 by PCR NOT DETECTED NOTDET      Coronavirus NL63 by PCR NOT DETECTED NOTDET      Coronavirus OC43 by PCR NOT DETECTED NOTDET      SARS-CoV-2, PCR NOT DETECTED NOTDET      Human Metapneumovirus by PCR NOT DETECTED NOTDET      Rhinovirus Enterovirus PCR NOT DETECTED NOTDET      Influenza A by PCR NOT DETECTED NOTDET      Influenza B PCR NOT DETECTED NOTDET      Parainfluenza 1 PCR NOT DETECTED NOTDET      Parainfluenza 2 PCR NOT DETECTED NOTDET      Parainfluenza 3 PCR NOT DETECTED NOTDET      Parainfluenza 4 PCR NOT DETECTED NOTDET      Respiratory Syncytial Virus by PCR NOT DETECTED NOTDET      Bordetella parapertussis by PCR NOT DETECTED NOTDET      Bordetella pertussis by PCR NOT DETECTED NOTDET      Chlamydophila Pneumonia PCR NOT DETECTED NOTDET      Mycoplasma pneumo by PCR NOT DETECTED NOTDET     Lactic Acid    Collection Time: 10/07/22 12:59 PM   Result Value Ref Range    Lactic Acid, Plasma 4.2 (HH) 0.4 - 2.0 MMOL/L            Physical Exam:         General:    Alert, following me with her eyes and will follow some commands.  Lethargic, diaphoretic    Eyes:    Conjunctivae/corneas clear. PERRL    Ears:    Normal     Nose:    Nares normal.     Mouth/Throat:    Dry tongue. Poor dentition. Neck:     no JVD. Back:     deferred    Lungs:     Clear to auscultation bilaterally. Heart:    Irregularly irregular with tachycardia     Abdomen:     Soft, non-tender. Bowel sounds normal.     Extremities:    Limited ROM in bed. No obvious lesions     Skin:    Skin intact    Neurologic:    Not speaking, very lethargic          Assessment and Plan         Principal Problem:    Severe sepsis (HCC)  Active Problems:    Inability to walk    LACY (acute kidney injury) ([address]    Metabolic acidosis    Transaminitis    Hypernatremia    Dementia without behavioral disturbance (HCC)    Type 2 diabetes with nephropathy (HCC)    Atrial fibrillation with RVR ([address]    Acquired hypothyroidism    Hypercholesterolemia  Resolved Problems:    * No resolved hospital problems. *    Severe sepsis met by temp, HR, WBC, LACY, elevated LFTs - Vanc/Ceftriaxone. Urine culture ordered, in the past has grown Klebsiella sensitive to Ceftriaxone along with e coli, enterococcus faecalis, and staph epidermidis. Blood cultures ordered. A fib RVR- cardizem drip. Tx sepsis. Unsure how long has had tachycardia. CHADSVASC 2 score is 5 so will start heparin drip and consult cardiology to ensure no need for MYLA    Metabolic acidosis - Tx sepsis    Hypernatremia - D5, give bolus due to how dehydrated she is. Do not correct more than 6-8 units in 24 hours to avoid cerebral edema. LACY - Tx sepsis. IV fluids. Transaminitis - Tx sepsis    HLD - holding statin    Hypothyroidism - Synthroid    Dementia - Namenda    Dm type II - SS    HTN- BB    Nystatin    Place on telemetry floor.      DVT prophylaxis - heparin drip  Signed By:    Héctor Corley DO    October 7, 2022

## 2022-10-07 NOTE — PROGRESS NOTES
TRANSFER - IN REPORT:    Verbal report received from PENNY Stokes on Danya Rivera being received from ER for routine progression of patient care      Report consisted of patients Situation, Background, Assessment and Recommendations(SBAR). Information from the following report(s) SBAR, Kardex, ED Summary, Intake/Output, MAR, Med Rec Status, and Cardiac Rhythm Afib  was reviewed with the receiving nurse. Opportunity for questions and clarification was provided. Assessment completed upon patients arrival to unit and care assumed.

## 2022-10-07 NOTE — PROGRESS NOTES
VANCO DAILY FOLLOW UP RENAL INSUFFICIENCY PATIENT   [address] [facility] Pharmacokinetic Monitoring Service - Vancomycin    Consulting Provider: Dr. Fortunato Mendoza   Indication: UTI, Sepsis  Target Concentration: Random level ? 15 mg/L  Day of Therapy: 1  Additional Antimicrobials: ceftriaxone    Pertinent Laboratory Values: Wt Readings from Last 1 Encounters:   10/07/22 250 lb (113.4 kg)     Temp Readings from Last 1 Encounters:   10/07/22 98.6 °F (37 °C) (Oral)     Recent Labs     10/07/22  1114 10/07/22  1259   BUN 53*  --    CREATININE 1.90*  --    WBC 17.8*  --    PROCAL 0.64*  --    LACACIDPL 5.7* 4.2*       No results found for: Ekaterina Flood    MRSA Nasal Swab: N/A. Non-respiratory infection. .    Assessment:  Date:  Dose/Freq Admin Times Level/Time:   10/7 2500 mg x 1 1500    10/8   Rd @ (0600)                       Plan:  Concentration-guided dosing due to renal impairment  Give vancomycin 2500 mg x 1 dose  Vancomycin concentration ordered for 10/8 @ 0600    Pharmacy will continue to monitor patient and adjust therapy as indicated    Thank you for the consult,  Marion Simmonds, [facility]

## 2022-10-07 NOTE — ED TRIAGE NOTES
Patient arrives to ED via EMS from [facility]. Facility called out related to SOB. When EMS arrived patient was diaphoretic with a HR of 250-290.      In route:  20 mg Cardizem  400 cc fluids    VS:  Temp: 102  BP: 110/60  O2:97% RA  RR 40

## 2022-10-08 ENCOUNTER — APPOINTMENT (OUTPATIENT)
Dept: ULTRASOUND IMAGING | Age: 72
DRG: 872 | End: 2022-10-08
Payer: MEDICARE

## 2022-10-08 ENCOUNTER — APPOINTMENT (OUTPATIENT)
Dept: NON INVASIVE DIAGNOSTICS | Age: 72
DRG: 872 | End: 2022-10-08
Payer: MEDICARE

## 2022-10-08 LAB
ACCESSION NUMBER, LLC1M: ABNORMAL
ACINETOBACTER CALCOAC BAUMANNII COMPLEX BY PCR: NOT DETECTED
ALBUMIN SERPL-MCNC: 2.9 G/DL (ref 3.2–4.6)
ALBUMIN/GLOB SERPL: 0.8 {RATIO} (ref 1.2–3.5)
ALP SERPL-CCNC: 103 U/L (ref 50–136)
ALT SERPL-CCNC: 151 U/L (ref 12–65)
ANION GAP SERPL CALC-SCNC: 11 MMOL/L (ref 4–13)
ANION GAP SERPL CALC-SCNC: 8 MMOL/L (ref 4–13)
ANION GAP SERPL CALC-SCNC: 8 MMOL/L (ref 4–13)
ANION GAP SERPL CALC-SCNC: 9 MMOL/L (ref 4–13)
AST SERPL-CCNC: 150 U/L (ref 15–37)
BACTEROIDES FRAGILIS BY PCR: NOT DETECTED
BASOPHILS # BLD: 0.1 K/UL (ref 0–0.2)
BASOPHILS NFR BLD: 0 % (ref 0–2)
BILIRUB SERPL-MCNC: 0.7 MG/DL (ref 0.2–1.1)
BIOFIRE TEST COMMENT: ABNORMAL
BUN SERPL-MCNC: 56 MG/DL (ref 8–23)
BUN SERPL-MCNC: 57 MG/DL (ref 8–23)
BUN SERPL-MCNC: 59 MG/DL (ref 8–23)
BUN SERPL-MCNC: 60 MG/DL (ref 8–23)
C ALBICANS DNA BLD POS QL NAA+NON-PROBE: NOT DETECTED
C GLABRATA DNA BLD POS QL NAA+NON-PROBE: NOT DETECTED
C KRUSEI DNA BLD POS QL NAA+NON-PROBE: NOT DETECTED
C PARAP DNA BLD POS QL NAA+NON-PROBE: NOT DETECTED
C TROPICLS DNA BLD POS QL NAA+NON-PROBE: NOT DETECTED
CALCIUM SERPL-MCNC: 8 MG/DL (ref 8.3–10.4)
CALCIUM SERPL-MCNC: 8.2 MG/DL (ref 8.3–10.4)
CALCIUM SERPL-MCNC: 8.3 MG/DL (ref 8.3–10.4)
CALCIUM SERPL-MCNC: 8.5 MG/DL (ref 8.3–10.4)
CANDIDA AURIS BY PCR: NOT DETECTED
CHLORIDE SERPL-SCNC: 128 MMOL/L (ref 101–110)
CHLORIDE SERPL-SCNC: 129 MMOL/L (ref 101–110)
CO2 SERPL-SCNC: 17 MMOL/L (ref 21–32)
CO2 SERPL-SCNC: 18 MMOL/L (ref 21–32)
CO2 SERPL-SCNC: 18 MMOL/L (ref 21–32)
CO2 SERPL-SCNC: 20 MMOL/L (ref 21–32)
CREAT SERPL-MCNC: 1.4 MG/DL (ref 0.6–1)
CREAT SERPL-MCNC: 1.5 MG/DL (ref 0.6–1)
CRYPTOCOCCUS NEOFORMANS/GATTII BY PCR: NOT DETECTED
DIFFERENTIAL METHOD BLD: ABNORMAL
E CLOAC COMP DNA BLD POS NAA+NON-PROBE: NOT DETECTED
E COLI DNA BLD POS QL NAA+NON-PROBE: NOT DETECTED
ENTEROBACTERALES BY PCR: NOT DETECTED
ENTEROCOCCUS FAECALIS BY PCR: NOT DETECTED
ENTEROCOCCUS FAECIUM BY PCR: NOT DETECTED
EOSINOPHIL # BLD: 0 K/UL (ref 0–0.8)
EOSINOPHIL NFR BLD: 0 % (ref 0.5–7.8)
ERYTHROCYTE [DISTWIDTH] IN BLOOD BY AUTOMATED COUNT: 18.8 % (ref 11.9–14.6)
GLOBULIN SER CALC-MCNC: 3.8 G/DL (ref 2.3–3.5)
GLUCOSE BLD STRIP.AUTO-MCNC: 154 MG/DL (ref 65–100)
GLUCOSE BLD STRIP.AUTO-MCNC: 169 MG/DL (ref 65–100)
GLUCOSE BLD STRIP.AUTO-MCNC: 194 MG/DL (ref 65–100)
GLUCOSE BLD STRIP.AUTO-MCNC: 210 MG/DL (ref 65–100)
GLUCOSE SERPL-MCNC: 124 MG/DL (ref 65–100)
GLUCOSE SERPL-MCNC: 192 MG/DL (ref 65–100)
GLUCOSE SERPL-MCNC: 218 MG/DL (ref 65–100)
GLUCOSE SERPL-MCNC: 286 MG/DL (ref 65–100)
GP B STREP DNA BLD POS QL NAA+NON-PROBE: NOT DETECTED
HAEM INFLU DNA BLD POS QL NAA+NON-PROBE: NOT DETECTED
HCT VFR BLD AUTO: 50.4 % (ref 35.8–46.3)
HGB BLD-MCNC: 15.8 G/DL (ref 11.7–15.4)
IMM GRANULOCYTES # BLD AUTO: 0.2 K/UL (ref 0–0.5)
IMM GRANULOCYTES NFR BLD AUTO: 1 % (ref 0–5)
K OXYTOCA DNA BLD POS QL NAA+NON-PROBE: NOT DETECTED
KLEBSIELLA AEROGENES BY PCR: NOT DETECTED
KLEBSIELLA PNEUMONIAE GROUP BY PCR: NOT DETECTED
L MONOCYTOG DNA BLD POS QL NAA+NON-PROBE: NOT DETECTED
LACTATE SERPL-SCNC: 1.4 MMOL/L (ref 0.4–2)
LYMPHOCYTES # BLD: 2.6 K/UL (ref 0.5–4.6)
LYMPHOCYTES NFR BLD: 11 % (ref 13–44)
MCH RBC QN AUTO: 29.3 PG (ref 26.1–32.9)
MCHC RBC AUTO-ENTMCNC: 31.3 G/DL (ref 31.4–35)
MCV RBC AUTO: 93.5 FL (ref 79.6–97.8)
MONOCYTES # BLD: 1.2 K/UL (ref 0.1–1.3)
MONOCYTES NFR BLD: 5 % (ref 4–12)
N MEN DNA BLD POS QL NAA+NON-PROBE: NOT DETECTED
NEUTS SEG # BLD: 20.5 K/UL (ref 1.7–8.2)
NEUTS SEG NFR BLD: 83 % (ref 43–78)
NRBC # BLD: 0.04 K/UL (ref 0–0.2)
OSMOLALITY SERPL: 344 MOSM/KG H2O (ref 280–301)
OSMOLALITY UR: 591 MOSM/KG H2O (ref 50–1400)
P AERUGINOSA DNA BLD POS NAA+NON-PROBE: NOT DETECTED
PLATELET # BLD AUTO: 144 K/UL (ref 150–450)
PMV BLD AUTO: 10.4 FL (ref 9.4–12.3)
POTASSIUM SERPL-SCNC: 3.3 MMOL/L (ref 3.5–5.1)
POTASSIUM SERPL-SCNC: 3.9 MMOL/L (ref 3.5–5.1)
POTASSIUM SERPL-SCNC: 4 MMOL/L (ref 3.5–5.1)
POTASSIUM SERPL-SCNC: 4.4 MMOL/L (ref 3.5–5.1)
PROT SERPL-MCNC: 6.7 G/DL (ref 6.3–8.2)
PROTEUS SP DNA BLD POS QL NAA+NON-PROBE: NOT DETECTED
RBC # BLD AUTO: 5.39 M/UL (ref 4.05–5.2)
RESISTANT GENE TARGETS: ABNORMAL
S AUREUS DNA BLD POS QL NAA+NON-PROBE: NOT DETECTED
S AUREUS+CONS DNA BLD POS NAA+NON-PROBE: DETECTED
S MARCESCENS DNA BLD POS NAA+NON-PROBE: NOT DETECTED
S PNEUM DNA BLD POS QL NAA+NON-PROBE: NOT DETECTED
S PYO DNA BLD POS QL NAA+NON-PROBE: NOT DETECTED
SALMONELLA SPECIES BY PCR: NOT DETECTED
SERVICE CMNT-IMP: ABNORMAL
SODIUM SERPL-SCNC: 154 MMOL/L (ref 136–145)
SODIUM SERPL-SCNC: 156 MMOL/L (ref 136–145)
SODIUM SERPL-SCNC: 156 MMOL/L (ref 136–145)
SODIUM SERPL-SCNC: 158 MMOL/L (ref 136–145)
SODIUM UR-SCNC: 14 MMOL/L
STAPHYLOCOCCUS EPIDERMIDIS BY PCR: NOT DETECTED
STAPHYLOCOCCUS LUGDUNENSIS BY PCR: NOT DETECTED
STENOTROPHOMONAS MALTOPHILIA BY PCR: NOT DETECTED
STREPTOCOCCUS DNA BLD POS NAA+NON-PROBE: NOT DETECTED
UFH PPP CHRO-ACNC: 0.49 IU/ML (ref 0.3–0.7)
UFH PPP CHRO-ACNC: 0.64 IU/ML (ref 0.3–0.7)
UFH PPP CHRO-ACNC: 0.76 IU/ML (ref 0.3–0.7)
VANCOMYCIN SERPL-MCNC: 25 UG/ML
WBC # BLD AUTO: 24.6 K/UL (ref 4.3–11.1)

## 2022-10-08 PROCEDURE — 2500000003 HC RX 250 WO HCPCS: Performed by: FAMILY MEDICINE

## 2022-10-08 PROCEDURE — 82962 GLUCOSE BLOOD TEST: CPT

## 2022-10-08 PROCEDURE — 1100000003 HC PRIVATE W/ TELEMETRY

## 2022-10-08 PROCEDURE — 94760 N-INVAS EAR/PLS OXIMETRY 1: CPT

## 2022-10-08 PROCEDURE — 51702 INSERT TEMP BLADDER CATH: CPT

## 2022-10-08 PROCEDURE — 99232 SBSQ HOSP IP/OBS MODERATE 35: CPT | Performed by: INTERNAL MEDICINE

## 2022-10-08 PROCEDURE — 83935 ASSAY OF URINE OSMOLALITY: CPT

## 2022-10-08 PROCEDURE — 80053 COMPREHEN METABOLIC PANEL: CPT

## 2022-10-08 PROCEDURE — 2580000003 HC RX 258: Performed by: FAMILY MEDICINE

## 2022-10-08 PROCEDURE — 87150 DNA/RNA AMPLIFIED PROBE: CPT

## 2022-10-08 PROCEDURE — 6370000000 HC RX 637 (ALT 250 FOR IP): Performed by: INTERNAL MEDICINE

## 2022-10-08 PROCEDURE — 85520 HEPARIN ASSAY: CPT

## 2022-10-08 PROCEDURE — 6360000002 HC RX W HCPCS: Performed by: FAMILY MEDICINE

## 2022-10-08 PROCEDURE — 6360000004 HC RX CONTRAST MEDICATION: Performed by: INTERNAL MEDICINE

## 2022-10-08 PROCEDURE — 83930 ASSAY OF BLOOD OSMOLALITY: CPT

## 2022-10-08 PROCEDURE — 36415 COLL VENOUS BLD VENIPUNCTURE: CPT

## 2022-10-08 PROCEDURE — 84300 ASSAY OF URINE SODIUM: CPT

## 2022-10-08 PROCEDURE — 6370000000 HC RX 637 (ALT 250 FOR IP): Performed by: FAMILY MEDICINE

## 2022-10-08 PROCEDURE — 85025 COMPLETE CBC W/AUTO DIFF WBC: CPT

## 2022-10-08 PROCEDURE — 83605 ASSAY OF LACTIC ACID: CPT

## 2022-10-08 PROCEDURE — 2700000000 HC OXYGEN THERAPY PER DAY

## 2022-10-08 PROCEDURE — C8929 TTE W OR WO FOL WCON,DOPPLER: HCPCS

## 2022-10-08 PROCEDURE — 6360000002 HC RX W HCPCS: Performed by: INTERNAL MEDICINE

## 2022-10-08 PROCEDURE — 80048 BASIC METABOLIC PNL TOTAL CA: CPT

## 2022-10-08 PROCEDURE — 80202 ASSAY OF VANCOMYCIN: CPT

## 2022-10-08 PROCEDURE — 2580000003 HC RX 258: Performed by: INTERNAL MEDICINE

## 2022-10-08 RX ORDER — METOPROLOL SUCCINATE 25 MG/1
50 TABLET, EXTENDED RELEASE ORAL DAILY
Status: DISCONTINUED | OUTPATIENT
Start: 2022-10-08 | End: 2022-10-13 | Stop reason: HOSPADM

## 2022-10-08 RX ORDER — POTASSIUM CHLORIDE 20 MEQ/1
40 TABLET, EXTENDED RELEASE ORAL 2 TIMES DAILY WITH MEALS
Status: ACTIVE | OUTPATIENT
Start: 2022-10-08 | End: 2022-10-10

## 2022-10-08 RX ORDER — POTASSIUM CHLORIDE 20 MEQ/1
40 TABLET, EXTENDED RELEASE ORAL ONCE
Status: COMPLETED | OUTPATIENT
Start: 2022-10-08 | End: 2022-10-08

## 2022-10-08 RX ORDER — HEPARIN SODIUM 10000 [USP'U]/100ML
4-30 INJECTION, SOLUTION INTRAVENOUS CONTINUOUS
Status: DISCONTINUED | OUTPATIENT
Start: 2022-10-08 | End: 2022-10-09

## 2022-10-08 RX ADMIN — MEMANTINE 10 MG: 5 TABLET ORAL at 20:24

## 2022-10-08 RX ADMIN — SODIUM CHLORIDE 15 MG/HR: 900 INJECTION, SOLUTION INTRAVENOUS at 04:44

## 2022-10-08 RX ADMIN — SODIUM CHLORIDE, PRESERVATIVE FREE 10 ML: 5 INJECTION INTRAVENOUS at 20:24

## 2022-10-08 RX ADMIN — CEFTRIAXONE 1000 MG: 1 INJECTION, POWDER, FOR SOLUTION INTRAMUSCULAR; INTRAVENOUS at 10:54

## 2022-10-08 RX ADMIN — SODIUM CHLORIDE 15 MG/HR: 900 INJECTION, SOLUTION INTRAVENOUS at 11:58

## 2022-10-08 RX ADMIN — NYSTATIN 500000 UNITS: 100000 SUSPENSION ORAL at 11:59

## 2022-10-08 RX ADMIN — POTASSIUM CHLORIDE 40 MEQ: 1500 TABLET, EXTENDED RELEASE ORAL at 10:55

## 2022-10-08 RX ADMIN — VANCOMYCIN HYDROCHLORIDE 1250 MG: 10 INJECTION, POWDER, LYOPHILIZED, FOR SOLUTION INTRAVENOUS at 17:39

## 2022-10-08 RX ADMIN — DEXTROSE MONOHYDRATE: 5 INJECTION, SOLUTION INTRAVENOUS at 20:24

## 2022-10-08 RX ADMIN — NYSTATIN 500000 UNITS: 100000 SUSPENSION ORAL at 20:24

## 2022-10-08 RX ADMIN — ANTI-FUNGAL POWDER MICONAZOLE NITRATE TALC FREE: 1.42 POWDER TOPICAL at 09:20

## 2022-10-08 RX ADMIN — TUBERCULIN PURIFIED PROTEIN DERIVATIVE 5 UNITS: 5 INJECTION, SOLUTION INTRADERMAL at 09:14

## 2022-10-08 RX ADMIN — POTASSIUM CHLORIDE 40 MEQ: 20 TABLET, EXTENDED RELEASE ORAL at 17:39

## 2022-10-08 RX ADMIN — MEMANTINE 10 MG: 5 TABLET ORAL at 09:16

## 2022-10-08 RX ADMIN — SODIUM CHLORIDE 15 MG/HR: 900 INJECTION, SOLUTION INTRAVENOUS at 17:57

## 2022-10-08 RX ADMIN — SODIUM CHLORIDE, PRESERVATIVE FREE 10 ML: 5 INJECTION INTRAVENOUS at 09:19

## 2022-10-08 RX ADMIN — NYSTATIN 500000 UNITS: 100000 SUSPENSION ORAL at 09:16

## 2022-10-08 RX ADMIN — NYSTATIN 500000 UNITS: 100000 SUSPENSION ORAL at 17:39

## 2022-10-08 RX ADMIN — PERFLUTREN 0.45 ML: 6.52 INJECTION, SUSPENSION INTRAVENOUS at 16:10

## 2022-10-08 RX ADMIN — ANTI-FUNGAL POWDER MICONAZOLE NITRATE TALC FREE: 1.42 POWDER TOPICAL at 20:25

## 2022-10-08 RX ADMIN — LEVOTHYROXINE SODIUM 75 MCG: 0.05 TABLET ORAL at 05:48

## 2022-10-08 RX ADMIN — DEXTROSE MONOHYDRATE: 5 INJECTION, SOLUTION INTRAVENOUS at 14:16

## 2022-10-08 RX ADMIN — METOPROLOL SUCCINATE 50 MG: 25 TABLET, FILM COATED, EXTENDED RELEASE ORAL at 09:16

## 2022-10-08 ASSESSMENT — PAIN SCALES - GENERAL
PAINLEVEL_OUTOF10: 0
PAINLEVEL_OUTOF10: 0

## 2022-10-08 NOTE — PLAN OF CARE
Problem: Discharge Planning  Goal: Discharge to home or other facility with appropriate resources  Outcome: Progressing     Problem: Skin/Tissue Integrity  Goal: Absence of new skin breakdown  Description: 1. Monitor for areas of redness and/or skin breakdown  2. Assess vascular access sites hourly  3. Every 4-6 hours minimum:  Change oxygen saturation probe site  4. Every 4-6 hours:  If on nasal continuous positive airway pressure, respiratory therapy assess nares and determine need for appliance change or resting period.   Outcome: Progressing     Problem: Safety - Adult  Goal: Free from fall injury  Outcome: Progressing     Problem: Cardiovascular - Adult  Goal: Maintains optimal cardiac output and hemodynamic stability  Outcome: Progressing  Goal: Absence of cardiac dysrhythmias or at baseline  Outcome: Progressing     Problem: Metabolic/Fluid and Electrolytes - Adult  Goal: Electrolytes maintained within normal limits  Outcome: Progressing  Goal: Hemodynamic stability and optimal renal function maintained  Outcome: Progressing  Goal: Glucose maintained within prescribed range  Outcome: Progressing

## 2022-10-08 NOTE — PROGRESS NOTES
Progress Note    Patient: Jazz Saldana MRN: [id number]  SSN: [id number]    YOB: 1950  Age: 67 y.o. Sex: female      Admit Date: 10/7/2022    LOS: 1 day     Assessment and Plan:   67-year-old female with a past medical history of atrial fibrillation not on anticoagulation, history of colon cancer, dementia, diabetes, that presented in the setting of fevers    1. Sepsis concerning urinary tract infection and Staph bacteremia unclear source  -Continue IV fluids  -Continue ceftriaxone and vancomycin for now  -Follow blood cultures  -Follow urine culture  -Obtain echocardiogram  -Repeated blood cultures  -Symptomatic management    2. Atrial fibrillation with RVR  -Telemetry monitoring  -Continue diltiazem drip  -Continue heparin drip  -Continue metoprolol    3. Hypernatremia  -Continue D5W  -Obtain serum osmolality and urine osmolality  Monitor sodium levels  -Avoid rapid correction  -Nephrology consultation    4. Hypokalemia  -Replete and  -Monitor potassium levels    5. Thrush  -Start nystatin    6. Diabetes mellitus  -Insulin sliding scale  -Blood sugar checks before meals and at bedtime    DVT prophylaxis with heparin drip      Subjective:   67-year-old female with a past medical history of atrial fibrillation not on anticoagulation, history of colon cancer, dementia, diabetes, that presented in the setting of fevers. Patient seen and examined at bedside. This morning the patient feeling tired. Denies any chest pain, no abdominal pain, no nausea or vomiting.     Objective:     Vitals:    10/08/22 0014 10/08/22 0439 10/08/22 0745 10/08/22 0916   BP: 127/79 120/69 119/77 122/69   Pulse: 95 (!) 101 96 (!) 105   Resp: 16 16 16    Temp: 97.7 °F (36.5 °C) 97.3 °F (36.3 °C) 98.2 °F (36.8 °C)    TempSrc: Axillary Axillary Oral    SpO2: 99% 100% 98%    Weight:  223 lb 8 oz (101.4 kg)     Height:            Intake and Output:  Current Shift: 10/08 0701 - 10/08 1900  In: 170 [P.O.:170]  Out: 0   Last three shifts: 10/06 1901 - 10/08 0700  In: -   Out: 700 [Urine:700]    ROS  10 ROS negative except from stated on subjective    Physical Exam:   General: Alert, oriented, NAD  HEENT: NC/AT, EOM are intact  Neck: supple, no JVD  Cardiovascular: RRR, S1, S2, no murmurs  Respiratory: Lungs are clear, no wheezes or rales  Abdomen: Soft, NT, ND  Back: No CVA tenderness, no paraspinal tenderness  Extremities: LE without pedal edema, no erythema  Neuro: A&O, CN are intact, no focal deficits  Skin: no rash or ulcers  Psych: good mood and affect    Lab/Data Review:  I have personally reviewed patients laboratory data showing  Recent Results (from the past 24 hour(s))   Lactic Acid    Collection Time: 10/07/22 12:59 PM   Result Value Ref Range    Lactic Acid, Plasma 4.2 (HH) 0.4 - 2.0 MMOL/L   APTT    Collection Time: 10/07/22  3:21 PM   Result Value Ref Range    PTT >200.0 (HH) 24.1 - 35.1 SEC   Protime-INR    Collection Time: 10/07/22  3:21 PM   Result Value Ref Range    Protime >120.0 (H) 12.6 - 14.5 sec    INR >16.2 (HH)    Lactic Acid    Collection Time: 10/07/22  4:44 PM   Result Value Ref Range    Lactic Acid, Plasma 2.1 (H) 0.4 - 2.0 MMOL/L   Basic Metabolic Panel    Collection Time: 10/07/22  4:44 PM   Result Value Ref Range    Sodium 152 (H) 136 - 145 mmol/L    Potassium 4.5 3.5 - 5.1 mmol/L    Chloride 125 (H) 101 - 110 mmol/L    CO2 17 (L) 21 - 32 mmol/L    Anion Gap 10 4 - 13 mmol/L    Glucose 175 (H) 65 - 100 mg/dL    BUN 62 (H) 8 - 23 MG/DL    Creatinine 1.80 (H) 0.6 - 1.0 MG/DL    Est, Glom Filt Rate 29 (L) >60 ml/min/1.73m2    Calcium 8.3 8.3 - 10.4 MG/DL   POCT Glucose    Collection Time: 10/07/22  8:27 PM   Result Value Ref Range    POC Glucose 126 (H) 65 - 100 mg/dL    Performed by: Mary Ellen    Basic Metabolic Panel    Collection Time: 10/07/22  9:11 PM   Result Value Ref Range    Sodium 153 (H) 136 - 145 mmol/L    Potassium 5.0 3.5 - 5.1 mmol/L    Chloride 128 (H) 101 - 110 mmol/L    CO2 11 (L) 21 - 32 mmol/L    Anion Gap 14 (H) 4 - 13 mmol/L    Glucose 148 (H) 65 - 100 mg/dL    BUN 62 (H) 8 - 23 MG/DL    Creatinine 1.70 (H) 0.6 - 1.0 MG/DL    Est, Glom Filt Rate 31 (L) >60 ml/min/1.73m2    Calcium 8.3 8.3 - 10.4 MG/DL   Anti-Xa, Unfractionated Heparin    Collection Time: 10/07/22  9:51 PM   Result Value Ref Range    Anti-XA Unfrac Heparin >1.1 (H) 0.3 - 0.7 IU/mL   Comprehensive Metabolic Panel w/ Reflex to MG    Collection Time: 10/08/22  5:18 AM   Result Value Ref Range    Sodium 158 (H) 136 - 145 mmol/L    Potassium 3.9 3.5 - 5.1 mmol/L    Chloride 129 (H) 101 - 110 mmol/L    CO2 18 (L) 21 - 32 mmol/L    Anion Gap 11 4 - 13 mmol/L    Glucose 124 (H) 65 - 100 mg/dL    BUN 59 (H) 8 - 23 MG/DL    Creatinine 1.50 (H) 0.6 - 1.0 MG/DL    Est, Glom Filt Rate 37 (L) >60 ml/min/1.73m2    Calcium 8.2 (L) 8.3 - 10.4 MG/DL    Total Bilirubin 0.7 0.2 - 1.1 MG/DL     (H) 12 - 65 U/L     (H) 15 - 37 U/L    Alk Phosphatase 103 50 - 136 U/L    Total Protein 6.7 6.3 - 8.2 g/dL    Albumin 2.9 (L) 3.2 - 4.6 g/dL    Globulin 3.8 (H) 2.3 - 3.5 g/dL    Albumin/Globulin Ratio 0.8 (L) 1.2 - 3.5     CBC with Auto Differential    Collection Time: 10/08/22  5:18 AM   Result Value Ref Range    WBC 24.6 (H) 4.3 - 11.1 K/uL    RBC 5.39 (H) 4.05 - 5.2 M/uL    Hemoglobin 15.8 (H) 11.7 - 15.4 g/dL    Hematocrit 50.4 (H) 35.8 - 46.3 %    MCV 93.5 79.6 - 97.8 FL    MCH 29.3 26.1 - 32.9 PG    MCHC 31.3 (L) 31.4 - 35.0 g/dL    RDW 18.8 (H) 11.9 - 14.6 %    Platelets 316 (L) 479 - 450 K/uL    MPV 10.4 9.4 - 12.3 FL    nRBC 0.04 0.0 - 0.2 K/uL    Differential Type AUTOMATED      Seg Neutrophils 83 (H) 43 - 78 %    Lymphocytes 11 (L) 13 - 44 %    Monocytes 5 4.0 - 12.0 %    Eosinophils % 0 (L) 0.5 - 7.8 %    Basophils 0 0.0 - 2.0 %    Immature Granulocytes 1 0.0 - 5.0 %    Segs Absolute 20.5 (H) 1.7 - 8.2 K/UL    Absolute Lymph # 2.6 0.5 - 4.6 K/UL    Absolute Mono # 1.2 0.1 - 1.3 K/UL    Absolute Eos # 0.0 0.0 - 0.8 K/UL Basophils Absolute 0.1 0.0 - 0.2 K/UL    Absolute Immature Granulocyte 0.2 0.0 - 0.5 K/UL   Anti-Xa, Unfractionated Heparin    Collection Time: 10/08/22  5:18 AM   Result Value Ref Range    Anti-XA Unfrac Heparin 0.64 0.3 - 0.7 IU/mL   Lactic Acid    Collection Time: 10/08/22  5:18 AM   Result Value Ref Range    Lactic Acid, Plasma 1.4 0.4 - 2.0 MMOL/L   Vancomycin Level, Random    Collection Time: 10/08/22  5:18 AM   Result Value Ref Range    Vancomycin Rm 25.0 UG/ML   Culture, Blood, PCR ID Panel    Collection Time: 10/08/22  5:50 AM    Specimen: Blood   Result Value Ref Range    Accession Number [id number]     Enterococcus faecalis by PCR NOT DETECTED NOTDET      Enterococcus faecium by PCR NOT DETECTED NOTDET      Listeria monocytogenes by PCR NOT DETECTED NOTDET      STAPHYLOCOCCUS Detected (A) NOTDET      Staphylococcus Aureus NOT DETECTED NOTDET      Staphylococcus epidermidis by PCR NOT DETECTED NOTDET      Staphylococcus lugdunensis by PCR NOT DETECTED NOTDET      STREPTOCOCCUS NOT DETECTED NOTDET      Streptococcus agalactiae (Group B) NOT DETECTED NOTDET      Strep pneumoniae NOT DETECTED NOTDET      Strep pyogenes,(Grp. A) NOT DETECTED NOTDET      Acinetobacter calcoac baumannii complex by PCR NOT DETECTED NOTDET      Bacteroides fragilis by PCR NOT DETECTED NOTDET      Enterobacteriaceae by PCR NOT DETECTED NOTDET      Enterobacter cloacae complex by PCR NOT DETECTED NOTDET      Escherichia Coli NOT DETECTED NOTDET      Klebsiella aerogenes by PCR NOT DETECTED NOTDET      Klebsiella oxytoca by PCR NOT DETECTED NOTDET      Klebsiella pneumoniae group by PCR NOT DETECTED NOTDET      Proteus by PCR NOT DETECTED NOTDET      Salmonella species by PCR NOT DETECTED NOTDET      Serratia marcescens by PCR NOT DETECTED NOTDET      Haemophilus Influenzae by PCR NOT DETECTED NOTDET      Neisseria meningitidis by PCR NOT DETECTED NOTDET      Pseudomonas aeruginosa NOT DETECTED NOTDET      Stenotrophomonas maltophilia by PCR NOT DETECTED NOTDET      Candida albicans by PCR NOT DETECTED NOTDET      Candida auris by PCR NOT DETECTED NOTDET      Candida glabrata NOT DETECTED NOTDET      Candida krusei by PCR NOT DETECTED NOTDET      Candida parapsilosis by PCR NOT DETECTED NOTDET      Candida tropicalis by PCR NOT DETECTED NOTDET      Cryptococcus neoformans/gattii by PCR NOT DETECTED NOTDET      Resistant gene targets          [employer] test comment        False positive results may rarely occur. Correlate with clinical,epidemiologic, and other laboratory findings   POCT Glucose    Collection Time: 10/08/22  7:58 AM   Result Value Ref Range    POC Glucose 154 (H) 65 - 100 mg/dL    Performed by: Rico Vazquez    Basic Metabolic Panel    Collection Time: 10/08/22  9:50 AM   Result Value Ref Range    Sodium 156 (H) 136 - 145 mmol/L    Potassium 3.3 (L) 3.5 - 5.1 mmol/L    Chloride 129 (H) 101 - 110 mmol/L    CO2 18 (L) 21 - 32 mmol/L    Anion Gap 9 4 - 13 mmol/L    Glucose 218 (H) 65 - 100 mg/dL    BUN 57 (H) 8 - 23 MG/DL    Creatinine 1.40 (H) 0.6 - 1.0 MG/DL    Est, Glom Filt Rate 40 (L) >60 ml/min/1.73m2    Calcium 8.0 (L) 8.3 - 10.4 MG/DL   Osmolality    Collection Time: 10/08/22  9:50 AM   Result Value Ref Range    Serum Osmolality 344 (H) 280 - 301 MOSM/kg H2O   POCT Glucose    Collection Time: 10/08/22 11:37 AM   Result Value Ref Range    POC Glucose 169 (H) 65 - 100 mg/dL    Performed by: Massiel Stage       [unfilled]     Image:  I have personally reviewed patients imaging showing  XR CHEST PORTABLE   Final Result   The lungs are clear. The heart is normal in size. No pneumothorax. No pleural effusions.          US RETROPERITONEAL COMPLETE    (Results Pending)        Current Facility-Administered Medications   Medication Dose Route Frequency Provider Last Rate Last Admin    nystatin (MYCOSTATIN) 285681 UNIT/ML suspension 500,000 Units  5 mL Oral 4x Daily Radhika Hennessy MD   500,000 Units at 10/08/22 0091 metoprolol succinate (TOPROL XL) extended release tablet 50 mg  50 mg Oral Daily Heather Morton MD   50 mg at 10/08/22 0916    vancomycin (VANCOCIN) 1250 mg in sodium chloride 0.9% 250 mL IVPB  1,250 mg IntraVENous Q24H Dimas Cartwright MD        potassium chloride (KLOR-CON M) extended release tablet 40 mEq  40 mEq Oral BID  Max Galloway MD        levothyroxine (SYNTHROID) tablet 75 mcg  75 mcg Oral Daily Katrinka Phalen, DO   75 mcg at 10/08/22 0548    memantine (NAMENDA) tablet 10 mg  10 mg Oral BID Katrinka Phalen, DO   10 mg at 10/08/22 0916    miconazole (MICOTIN) 2 % powder   Topical BID Katrinka Phalen, DO   Given at 10/08/22 0920    insulin lispro (HUMALOG) injection vial 0-8 Units  0-8 Units SubCUTAneous TID  Katrinka Phalen, DO        insulin lispro (HUMALOG) injection vial 0-4 Units  0-4 Units SubCUTAneous Nightly Katrinka Phalen, DO        glucose chewable tablet 16 g  4 tablet Oral PRN Katrinka Phalen, DO        dextrose bolus 10% 125 mL  125 mL IntraVENous PRN Katrinka Phalen, DO        Or    dextrose bolus 10% 250 mL  250 mL IntraVENous PRN Katrinka Phalen, DO        glucagon (rDNA) injection 1 mg  1 mg SubCUTAneous PRN Katrinka Phalen, DO        dextrose 10 % infusion   IntraVENous Continuous PRN Katrinka Phalen, DO        tuberculin injection 5 Units  5 Units IntraDERmal Once Katrinka Phalen, DO   5 Units at 10/08/22 0914    sodium chloride flush 0.9 % injection 5-40 mL  5-40 mL IntraVENous 2 times per day Katrinka Phalen, DO   10 mL at 10/08/22 0919    sodium chloride flush 0.9 % injection 5-40 mL  5-40 mL IntraVENous PRN Katrinka Phalen, DO        0.9 % sodium chloride infusion   IntraVENous PRN Katrinka Phalen, DO        polyethylene glycol (GLYCOLAX) packet 17 g  17 g Oral Daily PRN Katrinka Phalen, DO        acetaminophen (TYLENOL) tablet 650 mg  650 mg Oral Q6H PRN Katrinka Phalen, DO        Or    acetaminophen (TYLENOL) suppository 650 mg  650 mg Rectal Q6H PRN Dl [place], DO        dilTIAZem 100 mg in sodium chloride 0.9 % 100 mL infusion (ADD-[place])  2.5-15 mg/hr IntraVENous Continuous Dl Devonte, DO 15 mL/hr at 10/08/22 1158 15 mg/hr at 10/08/22 1158    dextrose 5 % solution   IntraVENous Continuous Dl Devonte, DO        heparin (porcine) injection 4,000 Units  4,000 Units IntraVENous PRN Dl Devonte, DO        heparin (porcine) injection 2,000 Units  2,000 Units IntraVENous PRN Dl [place], DO        heparin 25,000 units in dextrose 5% 250 mL (premix) infusion  5-30 Units/kg/hr IntraVENous Continuous Dl [place], DO 5.7 mL/hr at 10/08/22 0617 5 Units/kg/hr at 10/08/22 0617    cefTRIAXone (ROCEPHIN) 1,000 mg in sodium chloride 0.9 % 50 mL IVPB mini-bag  1,000 mg IntraVENous Q24H Dl Devonte, DO   Stopped at 10/08/22 13[address] problems     Patient Active Problem List   Diagnosis    Dementia without behavioral disturbance (HCC)    Type 2 diabetes with nephropathy (HCC)    Uterine mass    Atrial fibrillation with RVR (HCC)    Chronic anticoagulation    Acquired hypothyroidism    Hypercholesterolemia    Severe obesity (BMI 35.0-39. 9) with comorbidity ([address]    Inability to walk    Acute pain of left knee    Fall from ground level    Acute sepsis ([address]    LACY (acute kidney injury) ([address]    Metabolic acidosis    Transaminitis    Hypernatremia        I have reviewed, updated, and verified this note's content and spent 38 minutes of my 42 minutes visit performing counseling and coordination of care regarding medical management.        Signed By: Yuliya Deluna MD     October 8, 2022

## 2022-10-08 NOTE — PROGRESS NOTES
Patient was unavailable secondary to difficult blood draw then off floor for US procedure. PT to follow back up for eval when available. Alireza Bradford, PT, DPT, OCS.

## 2022-10-08 NOTE — PROGRESS NOTES
VANCO DAILY FOLLOW UP NOTE  [address] [facility] Pharmacokinetic Monitoring Service - Vancomycin    Consulting Provider: Alex Wiley MD   Indication: UTI, sepsis  Target Concentration: Goal AUC/-600 mg*hr/L  Day of Therapy: 2  Additional Antimicrobials: ceftriaxone    Pertinent Laboratory Values: Wt Readings from Last 1 Encounters:   10/08/22 223 lb 8 oz (101.4 kg)     Temp Readings from Last 1 Encounters:   10/08/22 98.2 °F (36.8 °C) (Oral)     Recent Labs     10/07/22  1114 10/07/22  1259 10/07/22  1644 10/07/22  2111 10/08/22  0518 10/08/22  0950   BUN 53*  --  62* 62* 59* 57*   CREATININE 1.90*  --  1.80* 1.70* 1.50* 1.40*   WBC 17.8*  --   --   --  24.6*  --    PROCAL 0.64*  --   --   --   --   --    LACACIDPL 5.7* 4.2* 2.1*  --  1.4  --      Estimated Creatinine Clearance: 44 mL/min (A) (based on SCr of 1.4 mg/dL (H)). Lab Results   Component Value Date/Time    VANCORANDOM 25.0 10/08/2022 05:18 AM       MRSA Nasal Swab: N/A. Non-respiratory infection.       Assessment:  Date/Time Dose Concentration AUC         Note: Serum concentrations collected for AUC dosing may appear elevated if collected in close proximity to the dose administered, this is not necessarily an indication of toxicity    Plan:  Change to dosing recommendations based on Bayesian software  Vancomycin started with 2500 mg x 1 dose and then 1250 mg q24h  Anticipated AUC of 577 and trough concentration of 17.0 at steady state  Renal labs as indicated   Vancomycin concentrations will be ordered as clinically appropriate   Pharmacy will continue to monitor patient and adjust therapy as indicated    Thank you for the consult,  Jos Nielsen, PharmD, BCOP  Clinical Pharmacist  Contact [employer]

## 2022-10-08 NOTE — PROGRESS NOTES
am  10/8/2022 7:40 AM    Admit Date: 10/7/2022    Admit Diagnosis: Hypernatremia [E87.0]  Atrial fibrillation with rapid ventricular response (HCC) [I48.91]  Acute kidney injury ([facility] [N17.9]  Severe sepsis ([facility] [A41.9, R65.20]  Urinary tract infection with hematuria, site unspecified [N39.0, R31.9]  Acute sepsis ([facility] [A41.9]      Subjective:    Patient : No acute events overnight. Patient sitting up in bed this AM. Patient admitted for urosepsis and elevated rate with PAF. Objective:    /69   Pulse (!) 101   Temp 97.3 °F (36.3 °C) (Axillary)   Resp 16   Ht 5' 7\" (1.702 m)   Wt 223 lb 8 oz (101.4 kg)   SpO2 100%   BMI 35.01 kg/m²     ROS:  General ROS: negative for - chills  Hematological and Lymphatic ROS: negative for - blood clots or jaundice  Respiratory ROS: no cough, shortness of breath, or wheezing  Cardiovascular ROS: no chest pain or dyspnea on exertion  Gastrointestinal ROS: no abdominal pain, change in bowel habits, or black or bloody stools  Neurological ROS: no TIA or stroke symptoms    Physical Exam:      Physical Examination: General appearance - Appearance: alert, well appearing, and in no distress.    Neck/lymph - supple, no significant adenopathy  Chest/CV - clear to auscultation, no wheezes, rales or rhonchi, symmetric air entry  Heart - normal rate, regular rhythm, normal S1, S2, no murmurs, rubs, clicks or gallops  Abdomen/GI - soft, nontender, nondistended, no masses or organomegaly   Musculoskeletal - no joint tenderness, deformity or swelling  Extremities - peripheral pulses normal, no pedal edema, no clubbing or cyanosis  Skin - normal coloration and turgor, no rashes, no suspicious skin lesions noted    Current Facility-Administered Medications   Medication Dose Route Frequency    levothyroxine (SYNTHROID) tablet 75 mcg  75 mcg Oral Daily    memantine (NAMENDA) tablet 10 mg  10 mg Oral BID    metoprolol succinate (TOPROL XL) extended release tablet 25 mg  25 mg Oral Daily miconazole (MICOTIN) 2 % powder   Topical BID    insulin lispro (HUMALOG) injection vial 0-8 Units  0-8 Units SubCUTAneous TID WC    insulin lispro (HUMALOG) injection vial 0-4 Units  0-4 Units SubCUTAneous Nightly    glucose chewable tablet 16 g  4 tablet Oral PRN    dextrose bolus 10% 125 mL  125 mL IntraVENous PRN    Or    dextrose bolus 10% 250 mL  250 mL IntraVENous PRN    glucagon (rDNA) injection 1 mg  1 mg SubCUTAneous PRN    dextrose 10 % infusion   IntraVENous Continuous PRN    tuberculin injection 5 Units  5 Units IntraDERmal Once    sodium chloride flush 0.9 % injection 5-40 mL  5-40 mL IntraVENous 2 times per day    sodium chloride flush 0.9 % injection 5-40 mL  5-40 mL IntraVENous PRN    0.9 % sodium chloride infusion   IntraVENous PRN    polyethylene glycol (GLYCOLAX) packet 17 g  17 g Oral Daily PRN    acetaminophen (TYLENOL) tablet 650 mg  650 mg Oral Q6H PRN    Or    acetaminophen (TYLENOL) suppository 650 mg  650 mg Rectal Q6H PRN    dilTIAZem 100 mg in sodium chloride 0.9 % 100 mL infusion (ADD-[place])  2.5-15 mg/hr IntraVENous Continuous    dextrose 5 % solution   IntraVENous Continuous    heparin (porcine) injection 4,000 Units  4,000 Units IntraVENous PRN    heparin (porcine) injection 2,000 Units  2,000 Units IntraVENous PRN    heparin 25,000 units in dextrose 5% 250 mL (premix) infusion  5-30 Units/kg/hr IntraVENous Continuous    cefTRIAXone (ROCEPHIN) 1,000 mg in sodium chloride 0.9 % 50 mL IVPB mini-bag  1,000 mg IntraVENous Q24H    vancomycin (VANCOCIN) intermittent dosing (placeholder)   Other RX Placeholder       Data Review: data included in this note has been independently reviewed by the author     TELEMETRY: atrial fibrillation    Assessment/Plan:     Patient Active Problem List   Diagnosis    Dementia without behavioral disturbance (HCC)    Type 2 diabetes with nephropathy (HCC)    Uterine mass    Atrial fibrillation with RVR (HCC)    Chronic anticoagulation    Acquired hypothyroidism    Hypercholesterolemia    Severe obesity (BMI 35.0-39. 9) with comorbidity ([address]    Inability to walk    Acute pain of left knee    Fall from ground level    Acute sepsis ([address]    LACY (acute kidney injury) ([address]    Metabolic acidosis    Transaminitis    Hypernatremia     PLAN  PAF  - patient has hx of PAF, likely provoked by current UTI. No need for MYLA at this time. - BP stable on diltiazem drip  - Metoprolol XL 50 mg qd  - currently on heparin drip, consider switching to [facility] in the future    LACY  - Scr now 1.5 from 1.9.     UTI/Urosepsis  - WBC 24.6  - Urine culture pending  - continue TY Mota  I have personally seen and evaluated the patient and reviewed the students note and agree with the following assessment and plan and findings. I was present for and observed the key components of this note. Any appropriate additions or editing of the information have been done by me.     Jewell Varma MD, [facility]  Cardiology

## 2022-10-08 NOTE — PROGRESS NOTES
Patient had not voided all night. Bladder scanned the patient and scanned approximately 400 mL. Notified Gladys Ruiz MD, and received orders to straight cath the patient. Performed straight cath and removed 500 mL of urine.

## 2022-10-08 NOTE — CONSULTS
Nephrology consult    Admission Date:  10/7/2022    Admission Diagnosis  Hypernatremia [E87.0]  Atrial fibrillation with rapid ventricular response (HCC) [I48.91]  Acute kidney injury ([facility] [N17.9]  Severe sepsis ([facility] [A41.9, R65.20]  Urinary tract infection with hematuria, site unspecified [N39.0, R31.9]  Acute sepsis ([facility] [A41.9]        History of Present Illness:    79-year-old female with past medical history of history of colon cancer, dementia, A. fib, diabetes, thyroid disease is admitted for shortness of breath and fever and A. fib RVR. Heart rate was between 1 50-1 80. Received diltiazem. Patient is not on anticoagulation given the history of previous colon cancer. He lives at [facility]     Seen in room. Lying down in bed comfortably.   Talking very little    Past Medical History:   Diagnosis Date    Atrial fibrillation ([facility]     Colon cancer ([facility] 1999    Dementia ([facility]     with memory loss    Diabetes ([facility]     Menopause     Thyroid disease     Uterine mass       Past Surgical History:   Procedure Laterality Date    TOTAL COLECTOMY  1999      Current Facility-Administered Medications   Medication Dose Route Frequency    nystatin (MYCOSTATIN) 177999 UNIT/ML suspension 500,000 Units  5 mL Oral 4x Daily    metoprolol succinate (TOPROL XL) extended release tablet 50 mg  50 mg Oral Daily    vancomycin (VANCOCIN) 1250 mg in sodium chloride 0.9% 250 mL IVPB  1,250 mg IntraVENous Q24H    levothyroxine (SYNTHROID) tablet 75 mcg  75 mcg Oral Daily    memantine (NAMENDA) tablet 10 mg  10 mg Oral BID    miconazole (MICOTIN) 2 % powder   Topical BID    insulin lispro (HUMALOG) injection vial 0-8 Units  0-8 Units SubCUTAneous TID WC    insulin lispro (HUMALOG) injection vial 0-4 Units  0-4 Units SubCUTAneous Nightly    glucose chewable tablet 16 g  4 tablet Oral PRN    dextrose bolus 10% 125 mL  125 mL IntraVENous PRN    Or    dextrose bolus 10% 250 mL  250 mL IntraVENous PRN glucagon (rDNA) injection 1 mg  1 mg SubCUTAneous PRN    dextrose 10 % infusion   IntraVENous Continuous PRN    tuberculin injection 5 Units  5 Units IntraDERmal Once    sodium chloride flush 0.9 % injection 5-40 mL  5-40 mL IntraVENous 2 times per day    sodium chloride flush 0.9 % injection 5-40 mL  5-40 mL IntraVENous PRN    0.9 % sodium chloride infusion   IntraVENous PRN    polyethylene glycol (GLYCOLAX) packet 17 g  17 g Oral Daily PRN    acetaminophen (TYLENOL) tablet 650 mg  650 mg Oral Q6H PRN    Or    acetaminophen (TYLENOL) suppository 650 mg  650 mg Rectal Q6H PRN    dilTIAZem 100 mg in sodium chloride 0.9 % 100 mL infusion ([facility])  2.5-15 mg/hr IntraVENous Continuous    dextrose 5 % solution   IntraVENous Continuous    heparin (porcine) injection 4,000 Units  4,000 Units IntraVENous PRN    heparin (porcine) injection 2,000 Units  2,000 Units IntraVENous PRN    heparin 25,000 units in dextrose 5% 250 mL (premix) infusion  5-30 Units/kg/hr IntraVENous Continuous    cefTRIAXone (ROCEPHIN) 1,000 mg in sodium chloride 0.9 % 50 mL IVPB mini-bag  1,000 mg IntraVENous Q24H     No Known Allergies   Social History     Tobacco Use    Smoking status: Never    Smokeless tobacco: Never   Substance Use Topics    Alcohol use: No      Family History   Problem Relation Age of Onset    Diabetes Mother     Diabetes Father         Review of Systems  Gen - no fever, no chills, appetite okay  HEENT - no sore throat, no decreased vision, no hearing loss  Neck - no neck mass  CV - no chest pain, no palpitation, no orthopnea  Lung - no shortness of breath, no cough, no hemoptysis  Abd - no tenderness, no nausea/vomiting, no bloody stool  Ext - no edema, no clubbing, no cyanosis  Musculoskeletal - no joint pain, no back pain  Neurologic - no headaches, no dizziness, no seizures  Psychiatric - no anxiety, no depression  Skin - no rashes, no pupura  Genitourinary - no decreased urine output, no hematuria, no foamy urine    Objective:     Vitals:    10/08/22 0014 10/08/22 0439 10/08/22 0745 10/08/22 0916   BP: 127/79 120/69 119/77 122/69   Pulse: 95 (!) 101 96 (!) 105   Resp: 16 16 16    Temp: 97.7 °F (36.5 °C) 97.3 °F (36.3 °C) 98.2 °F (36.8 °C)    TempSrc: Axillary Axillary Oral    SpO2: 99% 100% 98%    Weight:  223 lb 8 oz (101.4 kg)     Height:           Intake/Output Summary (Last 24 hours) at 10/8/2022 1209  Last data filed at 10/8/2022 0830  Gross per 24 hour   Intake 120 ml   Output 500 ml   Net -380 ml       Physical Exam  GEN :in no distress, alert and oriented  HEENT: anicteric sclerae, Mucous membranes are moist.  Neck - supple without JVD  CV - regular rate and rhythm, no murmur, no rub  Lung - clear bilaterally, lungs expand symmetrically  Abd - soft, nontender, bowel sounds present  Ext - no clubbing, no cyanosis, no edema  Neurologic - nonfocal  Genitourinary - bladder nonpalpable  Skin - no rashes, no purpura  Psychiatric: Normal mood and affect. Patient Active Problem List    Diagnosis Date Noted    Acute sepsis ([facility] 10/07/2022    LACY (acute kidney injury) ([facility] 85/50/0739    Metabolic acidosis 80/91/8623    Transaminitis 10/07/2022    Hypernatremia 10/07/2022    Inability to walk 08/11/2022    Acute pain of left knee 08/11/2022    Fall from ground level 08/11/2022    Hypercholesterolemia 04/25/2019    Type 2 diabetes with nephropathy ([address] 01/14/2019    Severe obesity (BMI 35.0-39. 9) with comorbidity ([address] 01/14/2019    Chronic anticoagulation 10/19/2018    Uterine mass 10/17/2018    Dementia without behavioral disturbance ([address] 09/19/2018    Atrial fibrillation with RVR ([facility] 09/19/2018    Acquired hypothyroidism 09/19/2018     XR CHEST PORTABLE   Final Result   The lungs are clear. The heart is normal in size. No pneumothorax. No pleural effusions. US RETROPERITONEAL COMPLETE    (Results Pending)     [unfilled]  No results found for this or any previous visit.       Data Review: Recent Labs     10/07/22  1114 10/08/22  0518   WBC 17.8* 24.6*   HGB 16.2* 15.8*   HCT 53.1* 50.4*    144*     Recent Labs     10/07/22  2111 10/08/22  0518 10/08/22  0950   * 158* 156*   K 5.0 3.9 3.3*   * 129* 129*   CO2 11* 18* 18*   BUN 62* 59* 57*   CREATININE 1.70* 1.50* 1.40*     No results for input(s): PH, PCO2, PO2, PCO2 in the last 72 hours. Plan:     1. LACY  Your volume contraction seen with hyperosmolarity serum osmolality 344  Renal function has significantly improved from 1.9-1.4 a creatinine with IV fluids  Baseline renal function was within normal limit. UA shows UTI. Yeast is seen. Nonoliguric. Will plan for ultrasound of the kidney if renal function is not improving with IV fluids      2. Hypernatremia  Admitted with sodium of 155 and today it is 156  On D5 water      3. Hypokalemia  Ordered replacement      . 4. UTI  On vancomycin. Advised to de-escalate as soon as possible    5. A. fib RVR  On Cardizem drip and metoprolol. On heparin drip  Cardiology on board    4.   Hyperosmolar non-anion gap hyperchloremic metabolic acidosis

## 2022-10-09 ENCOUNTER — APPOINTMENT (OUTPATIENT)
Dept: ULTRASOUND IMAGING | Age: 72
DRG: 872 | End: 2022-10-09
Payer: MEDICARE

## 2022-10-09 LAB
ANION GAP SERPL CALC-SCNC: 6 MMOL/L (ref 4–13)
ANION GAP SERPL CALC-SCNC: 7 MMOL/L (ref 4–13)
BACTERIA SPEC CULT: NORMAL
BACTERIA SPEC CULT: NORMAL
BASOPHILS # BLD: 0 K/UL (ref 0–0.2)
BASOPHILS NFR BLD: 0 % (ref 0–2)
BUN SERPL-MCNC: 46 MG/DL (ref 8–23)
BUN SERPL-MCNC: 47 MG/DL (ref 8–23)
BUN SERPL-MCNC: 49 MG/DL (ref 8–23)
BUN SERPL-MCNC: 55 MG/DL (ref 8–23)
CALCIUM SERPL-MCNC: 8.2 MG/DL (ref 8.3–10.4)
CALCIUM SERPL-MCNC: 8.2 MG/DL (ref 8.3–10.4)
CALCIUM SERPL-MCNC: 8.4 MG/DL (ref 8.3–10.4)
CALCIUM SERPL-MCNC: 8.5 MG/DL (ref 8.3–10.4)
CHLORIDE SERPL-SCNC: 120 MMOL/L (ref 101–110)
CHLORIDE SERPL-SCNC: 124 MMOL/L (ref 101–110)
CHLORIDE SERPL-SCNC: 125 MMOL/L (ref 101–110)
CHLORIDE SERPL-SCNC: 128 MMOL/L (ref 101–110)
CO2 SERPL-SCNC: 16 MMOL/L (ref 21–32)
CO2 SERPL-SCNC: 19 MMOL/L (ref 21–32)
CO2 SERPL-SCNC: 19 MMOL/L (ref 21–32)
CO2 SERPL-SCNC: 20 MMOL/L (ref 21–32)
CREAT SERPL-MCNC: 1.4 MG/DL (ref 0.6–1)
CREAT SERPL-MCNC: 1.4 MG/DL (ref 0.6–1)
CREAT SERPL-MCNC: 1.5 MG/DL (ref 0.6–1)
CREAT SERPL-MCNC: 1.6 MG/DL (ref 0.6–1)
DIFFERENTIAL METHOD BLD: ABNORMAL
ECHO AO ASC DIAM: 3 CM
ECHO AO ASCENDING AORTA INDEX: 1.42 CM/M2
ECHO AO ROOT DIAM: 3.2 CM
ECHO AO ROOT INDEX: 1.51 CM/M2
ECHO AV AREA PEAK VELOCITY: 1.9 CM2
ECHO AV AREA VTI: 2 CM2
ECHO AV AREA/BSA PEAK VELOCITY: 0.9 CM2/M2
ECHO AV AREA/BSA VTI: 0.9 CM2/M2
ECHO AV MEAN GRADIENT: 2 MMHG
ECHO AV MEAN VELOCITY: 0.6 M/S
ECHO AV PEAK GRADIENT: 3 MMHG
ECHO AV PEAK VELOCITY: 0.9 M/S
ECHO AV VELOCITY RATIO: 0.67
ECHO AV VTI: 14.4 CM
ECHO BSA: 2.32 M2
ECHO LA AREA 4C: 18 CM2
ECHO LA DIAMETER INDEX: 1.75 CM/M2
ECHO LA DIAMETER: 3.7 CM
ECHO LA MAJOR AXIS: 5.4 CM
ECHO LA TO AORTIC ROOT RATIO: 1.16
ECHO LV E' LATERAL VELOCITY: 11 CM/S
ECHO LV E' SEPTAL VELOCITY: 7 CM/S
ECHO LV FRACTIONAL SHORTENING: 28 % (ref 28–44)
ECHO LV INTERNAL DIMENSION DIASTOLE INDEX: 1.7 CM/M2
ECHO LV INTERNAL DIMENSION DIASTOLIC: 3.6 CM (ref 3.9–5.3)
ECHO LV INTERNAL DIMENSION SYSTOLIC INDEX: 1.23 CM/M2
ECHO LV INTERNAL DIMENSION SYSTOLIC: 2.6 CM
ECHO LV IVSD: 1 CM (ref 0.6–0.9)
ECHO LV MASS 2D: 115.9 G (ref 67–162)
ECHO LV MASS INDEX 2D: 54.7 G/M2 (ref 43–95)
ECHO LV POSTERIOR WALL DIASTOLIC: 1.1 CM (ref 0.6–0.9)
ECHO LV RELATIVE WALL THICKNESS RATIO: 0.61
ECHO LVOT AREA: 2.8 CM2
ECHO LVOT AV VTI INDEX: 0.71
ECHO LVOT DIAM: 1.9 CM
ECHO LVOT MEAN GRADIENT: 1 MMHG
ECHO LVOT PEAK GRADIENT: 1 MMHG
ECHO LVOT PEAK VELOCITY: 0.6 M/S
ECHO LVOT STROKE VOLUME INDEX: 13.6 ML/M2
ECHO LVOT SV: 28.9 ML
ECHO LVOT VTI: 10.2 CM
ECHO MV E DECELERATION TIME (DT): 179 MS
ECHO MV E VELOCITY: 0.73 M/S
ECHO MV E/E' LATERAL: 6.64
ECHO MV E/E' RATIO (AVERAGED): 8.53
ECHO MV E/E' SEPTAL: 10.43
EOSINOPHIL # BLD: 0.2 K/UL (ref 0–0.8)
EOSINOPHIL NFR BLD: 1 % (ref 0.5–7.8)
ERYTHROCYTE [DISTWIDTH] IN BLOOD BY AUTOMATED COUNT: 17.8 % (ref 11.9–14.6)
GLUCOSE BLD STRIP.AUTO-MCNC: 180 MG/DL (ref 65–100)
GLUCOSE BLD STRIP.AUTO-MCNC: 197 MG/DL (ref 65–100)
GLUCOSE BLD STRIP.AUTO-MCNC: 222 MG/DL (ref 65–100)
GLUCOSE BLD STRIP.AUTO-MCNC: 227 MG/DL (ref 65–100)
GLUCOSE SERPL-MCNC: 208 MG/DL (ref 65–100)
GLUCOSE SERPL-MCNC: 231 MG/DL (ref 65–100)
GLUCOSE SERPL-MCNC: 266 MG/DL (ref 65–100)
GLUCOSE SERPL-MCNC: 273 MG/DL (ref 65–100)
HCT VFR BLD AUTO: 46.2 % (ref 35.8–46.3)
HGB BLD-MCNC: 14.3 G/DL (ref 11.7–15.4)
IMM GRANULOCYTES # BLD AUTO: 0.2 K/UL (ref 0–0.5)
IMM GRANULOCYTES NFR BLD AUTO: 1 % (ref 0–5)
LYMPHOCYTES # BLD: 2.2 K/UL (ref 0.5–4.6)
LYMPHOCYTES NFR BLD: 11 % (ref 13–44)
MCH RBC QN AUTO: 29.3 PG (ref 26.1–32.9)
MCHC RBC AUTO-ENTMCNC: 31 G/DL (ref 31.4–35)
MCV RBC AUTO: 94.7 FL (ref 79.6–97.8)
MM INDURATION, POC: 0 MM (ref 0–5)
MONOCYTES # BLD: 1.3 K/UL (ref 0.1–1.3)
MONOCYTES NFR BLD: 6 % (ref 4–12)
NEUTS SEG # BLD: 16.5 K/UL (ref 1.7–8.2)
NEUTS SEG NFR BLD: 81 % (ref 43–78)
NRBC # BLD: 0.05 K/UL (ref 0–0.2)
PLATELET # BLD AUTO: 138 K/UL (ref 150–450)
PMV BLD AUTO: 10.8 FL (ref 9.4–12.3)
POTASSIUM SERPL-SCNC: 3.8 MMOL/L (ref 3.5–5.1)
POTASSIUM SERPL-SCNC: 4.1 MMOL/L (ref 3.5–5.1)
POTASSIUM SERPL-SCNC: 4.2 MMOL/L (ref 3.5–5.1)
POTASSIUM SERPL-SCNC: 5 MMOL/L (ref 3.5–5.1)
PPD, POC: NEGATIVE
RBC # BLD AUTO: 4.88 M/UL (ref 4.05–5.2)
SERVICE CMNT-IMP: ABNORMAL
SERVICE CMNT-IMP: NORMAL
SODIUM SERPL-SCNC: 146 MMOL/L (ref 136–145)
SODIUM SERPL-SCNC: 147 MMOL/L (ref 136–145)
SODIUM SERPL-SCNC: 150 MMOL/L (ref 136–145)
SODIUM SERPL-SCNC: 153 MMOL/L (ref 136–145)
UFH PPP CHRO-ACNC: 0.36 IU/ML (ref 0.3–0.7)
WBC # BLD AUTO: 20.4 K/UL (ref 4.3–11.1)

## 2022-10-09 PROCEDURE — 2700000000 HC OXYGEN THERAPY PER DAY

## 2022-10-09 PROCEDURE — 97535 SELF CARE MNGMENT TRAINING: CPT

## 2022-10-09 PROCEDURE — 2500000003 HC RX 250 WO HCPCS: Performed by: FAMILY MEDICINE

## 2022-10-09 PROCEDURE — 76770 US EXAM ABDO BACK WALL COMP: CPT

## 2022-10-09 PROCEDURE — 2580000003 HC RX 258: Performed by: FAMILY MEDICINE

## 2022-10-09 PROCEDURE — 87040 BLOOD CULTURE FOR BACTERIA: CPT

## 2022-10-09 PROCEDURE — 51702 INSERT TEMP BLADDER CATH: CPT

## 2022-10-09 PROCEDURE — 85520 HEPARIN ASSAY: CPT

## 2022-10-09 PROCEDURE — 94760 N-INVAS EAR/PLS OXIMETRY 1: CPT

## 2022-10-09 PROCEDURE — 6360000002 HC RX W HCPCS: Performed by: FAMILY MEDICINE

## 2022-10-09 PROCEDURE — 6370000000 HC RX 637 (ALT 250 FOR IP): Performed by: INTERNAL MEDICINE

## 2022-10-09 PROCEDURE — 80048 BASIC METABOLIC PNL TOTAL CA: CPT

## 2022-10-09 PROCEDURE — 99232 SBSQ HOSP IP/OBS MODERATE 35: CPT | Performed by: INTERNAL MEDICINE

## 2022-10-09 PROCEDURE — 36415 COLL VENOUS BLD VENIPUNCTURE: CPT

## 2022-10-09 PROCEDURE — 93306 TTE W/DOPPLER COMPLETE: CPT | Performed by: INTERNAL MEDICINE

## 2022-10-09 PROCEDURE — 82962 GLUCOSE BLOOD TEST: CPT

## 2022-10-09 PROCEDURE — 6370000000 HC RX 637 (ALT 250 FOR IP): Performed by: FAMILY MEDICINE

## 2022-10-09 PROCEDURE — 2580000003 HC RX 258: Performed by: INTERNAL MEDICINE

## 2022-10-09 PROCEDURE — 97165 OT EVAL LOW COMPLEX 30 MIN: CPT

## 2022-10-09 PROCEDURE — 85025 COMPLETE CBC W/AUTO DIFF WBC: CPT

## 2022-10-09 PROCEDURE — 6360000002 HC RX W HCPCS: Performed by: INTERNAL MEDICINE

## 2022-10-09 PROCEDURE — 1100000003 HC PRIVATE W/ TELEMETRY

## 2022-10-09 RX ADMIN — DEXTROSE MONOHYDRATE: 5 INJECTION, SOLUTION INTRAVENOUS at 15:23

## 2022-10-09 RX ADMIN — METOPROLOL SUCCINATE 50 MG: 25 TABLET, FILM COATED, EXTENDED RELEASE ORAL at 08:29

## 2022-10-09 RX ADMIN — INSULIN LISPRO 2 UNITS: 100 INJECTION, SOLUTION INTRAVENOUS; SUBCUTANEOUS at 12:13

## 2022-10-09 RX ADMIN — NYSTATIN 500000 UNITS: 100000 SUSPENSION ORAL at 08:28

## 2022-10-09 RX ADMIN — INSULIN LISPRO 2 UNITS: 100 INJECTION, SOLUTION INTRAVENOUS; SUBCUTANEOUS at 17:23

## 2022-10-09 RX ADMIN — MEMANTINE 10 MG: 5 TABLET ORAL at 08:29

## 2022-10-09 RX ADMIN — APIXABAN 5 MG: 5 TABLET, FILM COATED ORAL at 08:29

## 2022-10-09 RX ADMIN — CEFTRIAXONE 1000 MG: 1 INJECTION, POWDER, FOR SOLUTION INTRAMUSCULAR; INTRAVENOUS at 11:19

## 2022-10-09 RX ADMIN — DEXTROSE MONOHYDRATE: 5 INJECTION, SOLUTION INTRAVENOUS at 02:33

## 2022-10-09 RX ADMIN — POTASSIUM CHLORIDE 40 MEQ: 20 TABLET, EXTENDED RELEASE ORAL at 08:30

## 2022-10-09 RX ADMIN — APIXABAN 5 MG: 5 TABLET, FILM COATED ORAL at 20:48

## 2022-10-09 RX ADMIN — SODIUM CHLORIDE 12.5 MG/HR: 900 INJECTION, SOLUTION INTRAVENOUS at 00:25

## 2022-10-09 RX ADMIN — DEXTROSE MONOHYDRATE: 5 INJECTION, SOLUTION INTRAVENOUS at 23:30

## 2022-10-09 RX ADMIN — LEVOTHYROXINE SODIUM 75 MCG: 0.05 TABLET ORAL at 05:22

## 2022-10-09 RX ADMIN — MEMANTINE 10 MG: 5 TABLET ORAL at 20:48

## 2022-10-09 RX ADMIN — DEXTROSE MONOHYDRATE: 5 INJECTION, SOLUTION INTRAVENOUS at 00:27

## 2022-10-09 RX ADMIN — POTASSIUM CHLORIDE 40 MEQ: 20 TABLET, EXTENDED RELEASE ORAL at 15:56

## 2022-10-09 RX ADMIN — SODIUM CHLORIDE, PRESERVATIVE FREE 10 ML: 5 INJECTION INTRAVENOUS at 20:56

## 2022-10-09 RX ADMIN — DEXTROSE MONOHYDRATE: 5 INJECTION, SOLUTION INTRAVENOUS at 04:50

## 2022-10-09 RX ADMIN — VANCOMYCIN HYDROCHLORIDE 1000 MG: 1 INJECTION, POWDER, LYOPHILIZED, FOR SOLUTION INTRAVENOUS at 20:56

## 2022-10-09 RX ADMIN — NYSTATIN 500000 UNITS: 100000 SUSPENSION ORAL at 15:56

## 2022-10-09 RX ADMIN — ANTI-FUNGAL POWDER MICONAZOLE NITRATE TALC FREE: 1.42 POWDER TOPICAL at 08:32

## 2022-10-09 RX ADMIN — ANTI-FUNGAL POWDER MICONAZOLE NITRATE TALC FREE: 1.42 POWDER TOPICAL at 20:49

## 2022-10-09 RX ADMIN — NYSTATIN 500000 UNITS: 100000 SUSPENSION ORAL at 12:13

## 2022-10-09 RX ADMIN — SODIUM CHLORIDE, PRESERVATIVE FREE 10 ML: 5 INJECTION INTRAVENOUS at 08:28

## 2022-10-09 ASSESSMENT — PAIN SCALES - GENERAL
PAINLEVEL_OUTOF10: 0

## 2022-10-09 NOTE — PROGRESS NOTES
mobility and positioning. Min A for self-grooming tasks. Pt left sitting upright in bed with breakfast; required set-up for self-feeding tasks and verbal cues for facilitation. At this time, Rich Azevedo is functioning below baseline for ADL performance and functional mobility. Patient would benefit from skilled OT services to address goals and plan of care. [facility] [address] [address] AM-PAC 6 Clicks Daily Activity Inpatient Short Form:    AM-PAC Daily Activity Inpatient   How much help for putting on and taking off regular lower body clothing?: A Lot  How much help for Bathing?: A Lot  How much help for Toileting?: A Lot  How much help for putting on and taking off regular upper body clothing?: A Lot  How much help for taking care of personal grooming?: A Lot  How much help for eating meals?: A Lot  AM-Located within [facility] Inpatient Daily Activity Raw Score: 12  AM-PAC Inpatient ADL T-Scale Score : 30.6  ADL Inpatient CMS 0-100% Score: 66.57  ADL Inpatient CMS G-Code Modifier : CL           SUBJECTIVE:     Ms. Ata Vergara states, \"Leave the door open. \"     Social/Functional    Unclear PLOF d/t AMS however states shes lives alone and is independent with all ADLs and functional mobility prior to hospitalization  OBJECTIVE:     LINES / Rodriguez Esvin / AIRWAY: Rob Catheter and IV    RESTRICTIONS/PRECAUTIONS:       PAIN: VITALS / O2:   Pre Treatment:       Numeric 0-10  0/10    Post Treatment:   0/10       Vitals        WFL    Oxygen     WFL       GROSS EVALUATION: INTACT IMPAIRED   (See Comments)   UE AROM [] []   UE PROM [] []   Strength []  Generally weak     Posture / Balance []  Fair - to poor sitting balance; standing not tested   Sensation [x]     Coordination [x]       Tone [x]       Edema [x]    Activity Tolerance []  Generally decreased     Hand Dominance R [] L []      COGNITION/  PERCEPTION: INTACT IMPAIRED   (See Comments)   Orientation []  Oriented to place and time   Vision [x]     Hearing [x]     Cognition  []  AMS/confused Perception []  Decreased insight into deficits     MOBILITY: I Mod I S SBA CGA Min Mod Max Total  NT x2 Comments:   Bed Mobility    Rolling [] [] [] [] [] [] [] [x] [] [] []    Supine to Sit [] [] [] [] [] [] [] [] [] [x] []    Scooting [] [] [] [] [] [] [] [x] [] [] []    Sit to Supine [] [] [] [] [] [] [] [] [] [x] []    Transfers    Sit to Stand [] [] [] [] [] [] [] [] [] [x] []    Bed to Chair [] [] [] [] [] [] [] [] [] [x] []    Stand to Sit [] [] [] [] [] [] [] [] [] [x] []    Tub/Shower [] [] [] [] [] [] [] [] [] [] []     Toilet [] [] [] [] [] [] [] [] [] [] []      [] [] [] [] [] [] [] [] [] [] []    I=Independent, Mod I=Modified Independent, S=Supervision/Setup, SBA=Standby Assistance, CGA=Contact Guard Assistance, Min=Minimal Assistance, Mod=Moderate Assistance, Max=Maximal Assistance, Total=Total Assistance, NT=Not Tested    ACTIVITIES OF DAILY LIVING: I Mod I S SBA CGA Min Mod Max Total NT Comments   BASIC ADLs:              Upper Body Bathing  [] [] [] [] [] [] [] [] [] [x]    Lower Body Bathing [] [] [] [] [] [] [] [] [] [x]    Toileting [] [] [] [] [] [] [] [] [] [x]    Upper Body Dressing [] [] [] [] [] [] [] [] [] [x]    Lower Body Dressing [] [] [] [] [] [] [] [] [x] []    Feeding [] [] [x] [] [] [] [] [] [] []    Grooming [] [] [] [] [] [x] [] [] [] []    Personal Device Care [] [] [] [] [] [] [] [] [] [x]    Functional Mobility [] [] [] [] [] [] [] [x] [] []    I=Independent, Mod I=Modified Independent, S=Supervision/Setup, SBA=Standby Assistance, CGA=Contact Guard Assistance, Min=Minimal Assistance, Mod=Moderate Assistance, Max=Maximal Assistance, Total=Total Assistance, NT=Not Tested    PLAN:     FREQUENCY/DURATION   OT Plan of Care: 3 times/week for duration of hospital stay or until stated goals are met, whichever comes first.    PROBLEM LIST:   (Skilled intervention is medically necessary to address:)  Decreased ADL/Functional Activities  Decreased Activity Tolerance  Decreased AROM/PROM  Decreased Balance  Decreased Cognition  Decreased Gait Ability  Decreased Safety Awareness  Decreased Strength  Decreased Transfer Abilities  Increased Pain   INTERVENTIONS PLANNED:  (Benefits and precautions of occupational therapy have been discussed with the patient.)  Self Care Training  Therapeutic Activity  Therapeutic Exercise/HEP  Neuromuscular Re-education  Manual Therapy  Education         TREATMENT:     EVALUATION: LOW COMPLEXITY: (Untimed Charge)    TREATMENT:   Co-Treatment PT/OT necessary due to patient's decreased overall endurance/tolerance levels, as well as need for high level skilled assistance to complete functional transfers/mobility and functional tasks  Self Care (10 minutes): Patient participated in lower body dressing, self feeding, and grooming ADLs in supported sitting and unsupported sitting with minimal visual and verbal cueing to increase independence, decrease assistance required, and increase activity tolerance. Patient also participated in functional mobility, functional transfer, and energy conservation training to increase independence, decrease assistance required, and increase activity tolerance.      TREATMENT GRID:  N/A    AFTER TREATMENT PRECAUTIONS: Bed, Call light within reach, Needs within reach, and RN notified    INTERDISCIPLINARY COLLABORATION:  RN/ PCT, PT/ PTA, and OT/ JAUREGUI    EDUCATION:  Education Given To: Patient  Education Provided: Role of Therapy;Plan of Care  Barriers to Learning: Cognition  Education Outcome: Demonstrated understanding;Verbalized understanding      TOTAL TREATMENT DURATION AND TIME:  Time In: 0848  Time Out: 0902  Minutes: [facility], OT

## 2022-10-09 NOTE — PROGRESS NOTES
Tara Izaguirre  Admission Date: 10/7/2022         [facility] Progress Note: 10/9/2022    Follow-up for: LACY    The patient's chart is reviewed and the patient is discussed with the staff.     Subjective:     Not talking much secondary to her dementia  Sister present bedside  Rob catheter with tea colored good urine output seen    ROS:  Gen - no fever, no chills, appetite unchanged  CV - no chest pain, no palpitation  Lung - no shortness of breath, no cough  Abd - no tenderness, no nausea/vomiting, no diarrhea  Ext - no edema    Current Facility-Administered Medications   Medication Dose Route Frequency    apixaban (ELIQUIS) tablet 5 mg  5 mg Oral BID    nystatin (MYCOSTATIN) 623703 UNIT/ML suspension 500,000 Units  5 mL Oral 4x Daily    metoprolol succinate (TOPROL XL) extended release tablet 50 mg  50 mg Oral Daily    vancomycin (VANCOCIN) 1250 mg in sodium chloride 0.9% 250 mL IVPB  1,250 mg IntraVENous Q24H    potassium chloride (KLOR-CON M) extended release tablet 40 mEq  40 mEq Oral BID WC    levothyroxine (SYNTHROID) tablet 75 mcg  75 mcg Oral Daily    memantine (NAMENDA) tablet 10 mg  10 mg Oral BID    miconazole (MICOTIN) 2 % powder   Topical BID    insulin lispro (HUMALOG) injection vial 0-8 Units  0-8 Units SubCUTAneous TID WC    insulin lispro (HUMALOG) injection vial 0-4 Units  0-4 Units SubCUTAneous Nightly    glucose chewable tablet 16 g  4 tablet Oral PRN    dextrose bolus 10% 125 mL  125 mL IntraVENous PRN    Or    dextrose bolus 10% 250 mL  250 mL IntraVENous PRN    glucagon (rDNA) injection 1 mg  1 mg SubCUTAneous PRN    dextrose 10 % infusion   IntraVENous Continuous PRN    sodium chloride flush 0.9 % injection 5-40 mL  5-40 mL IntraVENous 2 times per day    sodium chloride flush 0.9 % injection 5-40 mL  5-40 mL IntraVENous PRN    0.9 % sodium chloride infusion   IntraVENous PRN    polyethylene glycol (GLYCOLAX) packet 17 g  17 g Oral Daily PRN    acetaminophen (TYLENOL) tablet 650 mg  650 mg Oral Q6H PRN    Or    acetaminophen (TYLENOL) suppository 650 mg  650 mg Rectal Q6H PRN    dextrose 5 % solution   IntraVENous Continuous    cefTRIAXone (ROCEPHIN) 1,000 mg in sodium chloride 0.9 % 50 mL IVPB mini-bag  1,000 mg IntraVENous Q24H         Objective:     Vitals:    10/09/22 0730 10/09/22 0741 10/09/22 0811 10/09/22 0828   BP:   (!) 130/96 136/78   Pulse: 65 83 64 80   Resp:   16    Temp:   97.3 °F (36.3 °C)    TempSrc:   Axillary    SpO2:   98%    Weight:       Height:         Intake and Output:   10/07 1901 - 10/09 0700  In: 420 [P.O.:420]  Out: 1350 [Urine:1350]  No intake/output data recorded. Physical Exam:   Constitutional:  the patient is well developed and in no acute distress  HEENT:  Sclera clear, pupils equal, oral mucosa moist  Lungs: Clear  Cardiovascular:  RRR without M,G,R  Abd/GI: soft and non-tender; with positive bowel sounds. Ext: warm without cyanosis. There is no lower leg edema. US RETROPERITONEAL COMPLETE   Final Result   1. No evidence of hydronephrosis. 2. 6.7 cm right kidney simple cyst.   3. Normal left kidney. 4. Known pelvic mass. XR CHEST PORTABLE   Final Result   The lungs are clear. The heart is normal in size. No pneumothorax. No pleural effusions. [unfilled]    LAB  Recent Labs     10/07/22  1114 10/07/22  1521 10/08/22  0518 10/09/22  0144   WBC 17.8*  --  24.6* 20.4*   HGB 16.2*  --  15.8* 14.3   HCT 53.1*  --  50.4* 46.2     --  144* 138*   INR  --  >16.2*  --   --      Recent Labs     10/08/22  1609 10/08/22  2243 10/09/22  0144   * 154* 153*   K 4.4 4.0 4.2   * 129* 128*   CO2 20* 17* 19*   BUN 60* 56* 55*   CREATININE 1.50* 1.50* 1.60*       No results for input(s): PH, PCO2, PO2, HCO3 in the last 72 hours. Plan:  (Medical Decision Making)     1.   LACY  Your volume contraction seen with hyperosmolarity serum osmolality 344  Renal function has significantly improved from 1.9-1.4 a creatinine with IV fluids  Baseline renal function was within normal limit. UA shows UTI. Yeast is seen. Nonoliguric. Ultrasound kidney shows simple cyst otherwise within normal limit        2. Hypernatremia  Admitted with sodium of 155 and today it is 153  On D5 water        3. Hypokalemia  Ordered replacement        . 4. UTI  On vancomycin. Advised to de-escalate as soon as possible     5. A. fib RVR  On Cardizem drip and metoprolol. On heparin drip  Cardiology on board     4.   Hyperosmolar non-anion gap hyperchloremic metabolic acidosis    Discussed POC with sister and nurse bedside    Celestine Malave MD, MPH   [facility]

## 2022-10-09 NOTE — PROGRESS NOTES
am  10/9/2022 7:22 AM    Admit Date: 10/7/2022    Admit Diagnosis: Hypernatremia [E87.0]  Atrial fibrillation with rapid ventricular response (HCC) [I48.91]  Acute kidney injury ([facility] [N17.9]  Severe sepsis ([facility] [A41.9, R65.20]  Urinary tract infection with hematuria, site unspecified [N39.0, R31.9]  Acute sepsis ([facility] [A41.9]      Subjective:    Patient : No acute events overnight. Patient sitting up in bed this AM. Patient admitted for urosepsis and elevated rate with PAF. Patient appears hemodynamically stable from an A. fib standpoint we will make final adjustments for her rate control by increasing her Toprol-XL to 50 mg twice daily and adding Eliquis 5 mg p.o. twice daily    Objective:    BP (!) 146/71   Pulse 92   Temp 98.1 °F (36.7 °C) (Axillary)   Resp 18   Ht 5' 7\" (1.702 m)   Wt 229 lb 1.6 oz (103.9 kg)   SpO2 99%   BMI 35.88 kg/m²     ROS:  General ROS: negative for - chills  Hematological and Lymphatic ROS: negative for - blood clots or jaundice  Respiratory ROS: no cough, shortness of breath, or wheezing  Cardiovascular ROS: no chest pain or dyspnea on exertion  Gastrointestinal ROS: no abdominal pain, change in bowel habits, or black or bloody stools  Neurological ROS: no TIA or stroke symptoms    Physical Exam:      Physical Examination: General appearance - Appearance: alert, well appearing, and in no distress.    Neck/lymph - supple, no significant adenopathy  Chest/CV - clear to auscultation, no wheezes, rales or rhonchi, symmetric air entry  Heart - normal rate, regular rhythm, normal S1, S2, no murmurs, rubs, clicks or gallops  Abdomen/GI - soft, nontender, nondistended, no masses or organomegaly   Musculoskeletal - no joint tenderness, deformity or swelling  Extremities - peripheral pulses normal, no pedal edema, no clubbing or cyanosis  Skin - normal coloration and turgor, no rashes, no suspicious skin lesions noted    Current Facility-Administered Medications   Medication Dose Route Frequency    nystatin (MYCOSTATIN) 646920 UNIT/ML suspension 500,000 Units  5 mL Oral 4x Daily    metoprolol succinate (TOPROL XL) extended release tablet 50 mg  50 mg Oral Daily    vancomycin (VANCOCIN) 1250 mg in sodium chloride 0.9% 250 mL IVPB  1,250 mg IntraVENous Q24H    potassium chloride (KLOR-CON M) extended release tablet 40 mEq  40 mEq Oral BID WC    heparin 25,000 units in dextrose 5% 250 mL (premix) infusion  4-30 Units/kg/hr IntraVENous Continuous    levothyroxine (SYNTHROID) tablet 75 mcg  75 mcg Oral Daily    memantine (NAMENDA) tablet 10 mg  10 mg Oral BID    miconazole (MICOTIN) 2 % powder   Topical BID    insulin lispro (HUMALOG) injection vial 0-8 Units  0-8 Units SubCUTAneous TID WC    insulin lispro (HUMALOG) injection vial 0-4 Units  0-4 Units SubCUTAneous Nightly    glucose chewable tablet 16 g  4 tablet Oral PRN    dextrose bolus 10% 125 mL  125 mL IntraVENous PRN    Or    dextrose bolus 10% 250 mL  250 mL IntraVENous PRN    glucagon (rDNA) injection 1 mg  1 mg SubCUTAneous PRN    dextrose 10 % infusion   IntraVENous Continuous PRN    tuberculin injection 5 Units  5 Units IntraDERmal Once    sodium chloride flush 0.9 % injection 5-40 mL  5-40 mL IntraVENous 2 times per day    sodium chloride flush 0.9 % injection 5-40 mL  5-40 mL IntraVENous PRN    0.9 % sodium chloride infusion   IntraVENous PRN    polyethylene glycol (GLYCOLAX) packet 17 g  17 g Oral Daily PRN    acetaminophen (TYLENOL) tablet 650 mg  650 mg Oral Q6H PRN    Or    acetaminophen (TYLENOL) suppository 650 mg  650 mg Rectal Q6H PRN    dilTIAZem 100 mg in sodium chloride 0.9 % 100 mL infusion ([facility])  2.5-15 mg/hr IntraVENous Continuous    dextrose 5 % solution   IntraVENous Continuous    heparin (porcine) injection 4,000 Units  4,000 Units IntraVENous PRN    heparin (porcine) injection 2,000 Units  2,000 Units IntraVENous PRN    cefTRIAXone (ROCEPHIN) 1,000 mg in sodium chloride 0.9 % 50 mL IVPB mini-bag  1,000 mg IntraVENous Q24H       Data Review: data included in this note has been independently reviewed by the author     TELEMETRY: atrial fibrillation    Assessment/Plan:     Patient Active Problem List   Diagnosis    Dementia without behavioral disturbance (HCC)    Type 2 diabetes with nephropathy ([address]    Uterine mass    Atrial fibrillation with RVR (HCC)    Chronic anticoagulation    Acquired hypothyroidism    Hypercholesterolemia    Severe obesity (BMI 35.0-39. 9) with comorbidity ([address]    Inability to walk    Acute pain of left knee    Fall from ground level    Acute sepsis ([address]    LACY (acute kidney injury) ([address]    Metabolic acidosis    Transaminitis    Hypernatremia     PLAN  PAF  - patient has hx of PAF, likely provoked by current UTI. No need for MYLA at this time. - BP stable on diltiazem drip  - Metoprolol XL 50 mg qd  - currently on heparin drip, consider switching to [address] [place] [address] in the future    LACY  - Scr now 1.5 from 1.9.     UTI/Urosepsis  - WBC 24.6  - Urine culture pending  - continue Rocephin    Toprol-XL 50 mg p.o. twice daily and Eliquis 5 mg p.o. twice daily please call if needed    Benny Perkins MD  I have personally seen and evaluated the patient and reviewed the students note and agree with the following assessment and plan and findings. I was present for and observed the key components of this note. Any appropriate additions or editing of the information have been done by me.     Gema Mcallister MD, [facility]  Cardiology

## 2022-10-09 NOTE — PROGRESS NOTES
New orders placed for patient to start on Eliquis today at 9am. Patient currenty on a heparin gtt, Dr. Rashaun Horner notified and new orders placed for heparin gtt to be discontinued.

## 2022-10-09 NOTE — PROGRESS NOTES
Progress Note    Patient: Merary Ribeiro MRN: [id number]  SSN: [id number]    YOB: 1950  Age: 67 y.o. Sex: female      Admit Date: 10/7/2022    LOS: 2 days     Assessment and Plan:   67-year-old female with a past medical history of atrial fibrillation not on anticoagulation, history of colon cancer, dementia, diabetes, that presented in the setting of fevers    1. Sepsis concerning urinary tract infection and Staph bacteremia unclear source  -Continue IV fluids  -Continue ceftriaxone and vancomycin for now  -Follow blood cultures  -Follow urine culture  -Echocardiogram without signs of IE  -Repeat blood cultures  -Symptomatic management    2. Atrial fibrillation with RVR  -Telemetry monitoring  -Continue metoprolol  -Started on Eliquis    3. Hypernatremia  -Continue D5W  -Monitor sodium levels  -Avoid rapid correction  -Nephrology recommendations appreciated    4. Hypokalemia  -Replete and  -Monitor potassium levels    5. Thrush  -Continue nystatin    6. Diabetes mellitus  -Insulin sliding scale  -Blood sugar checks before meals and at bedtime    DVT prophylaxis with heparin drip      Subjective:   67-year-old female with a past medical history of atrial fibrillation not on anticoagulation, history of colon cancer, dementia, diabetes, that presented in the setting of fevers. Patient seen and examined at bedside. This morning the patient feeling little weak, denies any chest pain, no abdominal pain, no nausea or vomiting.     Objective:     Vitals:    10/09/22 0730 10/09/22 0741 10/09/22 0811 10/09/22 0828   BP:   (!) 130/96 136/78   Pulse: 65 83 64 80   Resp:   16    Temp:   97.3 °F (36.3 °C)    TempSrc:   Axillary    SpO2:   98%    Weight:       Height:            Intake and Output:  Current Shift: 10/09 0701 - 10/09 1900  In: -   Out: 200 [Urine:200]  Last three shifts: 10/07 1901 - 10/09 0700  In: 420 [P.O.:420]  Out: 1350 [Urine:1350]    ROS  10 ROS negative except from stated on subjective    Physical Exam:   General: Alert, oriented, NAD  HEENT: NC/AT, EOM are intact  Neck: supple, no JVD  Cardiovascular: RRR, S1, S2, no murmurs  Respiratory: Lungs are clear, no wheezes or rales  Abdomen: Soft, NT, ND  Back: No CVA tenderness, no paraspinal tenderness  Extremities: LE without pedal edema, no erythema  Neuro: A&O, CN are intact, no focal deficits  Skin: no rash or ulcers  Psych: good mood and affect    Lab/Data Review:  I have personally reviewed patients laboratory data showing  Recent Results (from the past 24 hour(s))   Anti-Xa, Unfractionated Heparin    Collection Time: 10/08/22 12:11 PM   Result Value Ref Range    Anti-XA Unfrac Heparin 0.76 (H) 0.3 - 0.7 IU/mL   Osmolality, Urine    Collection Time: 10/08/22 12:42 PM   Result Value Ref Range    Osmolality, Ur 591 50 - 1400 MOSM/kg H2O   Sodium, urine, random    Collection Time: 10/08/22 12:42 PM   Result Value Ref Range    SODIUM, RANDOM URINE 14 MMOL/L   Transthoracic echocardiogram (TTE) complete with contrast, bubble, strain, and 3D PRN    Collection Time: 10/08/22  2:45 PM   Result Value Ref Range    LA Major Alvo 5.4 cm    LA Area 4C 18.0 cm2    LA Diameter 3.7 cm    AV Mean Velocity 0.6 m/s    AV Mean Gradient 2 mmHg    AV VTI 14.4 cm    AV Peak Velocity 0.9 m/s    AV Peak Gradient 3 mmHg    AV Area by VTI 2.0 cm2    AV Area by Peak Velocity 1.9 cm2    Aortic Root 3.2 cm    Ascending Aorta 3.0 cm    IVSd 1.0 (A) 0.6 - 0.9 cm    LVIDd 3.6 (A) 3.9 - 5.3 cm    LVIDs 2.6 cm    LVOT Diameter 1.9 cm    LVOT Mean Gradient 1 mmHg    LVOT VTI 10.2 cm    LVOT Peak Velocity 0.6 m/s    LVOT Peak Gradient 1 mmHg    LVPWd 1.1 (A) 0.6 - 0.9 cm    LV E' Lateral Velocity 11 cm/s    LV E' Septal Velocity 7 cm/s    LVOT Area 2.8 cm2    LVOT SV 28.9 ml    MV E Wave Deceleration Time 179.0 ms    MV E Velocity 0.73 m/s    Body Surface Area 2.32 m2    Fractional Shortening 2D 28 28 - 44 %    LVIDd Index 1.70 cm/m2    LVIDs Index 1.23 cm/m2    LV RWT Ratio 0.61     LV Mass 2D 115.9 67 - 162 g    LV Mass 2D Index 54.7 43 - 95 g/m2    E/E' Ratio (Averaged) 8.53     E/E' Lateral 6.64     E/E' Septal 10.43     LVOT Stroke Volume Index 13.6 mL/m2    LA Size Index 1.75 cm/m2    LA/AO Root Ratio 1.16     Ao Root Index 1.51 cm/m2    Ascending Aorta Index 1.42 cm/m2    AV Velocity Ratio 0.67     LVOT:AV VTI Index 0.71     OSITO/BSA VTI 0.9 cm2/m2    OSITO/BSA Peak Velocity 0.9 AX6/L9   Basic Metabolic Panel    Collection Time: 10/08/22  4:09 PM   Result Value Ref Range    Sodium 156 (H) 136 - 145 mmol/L    Potassium 4.4 3.5 - 5.1 mmol/L    Chloride 128 (H) 101 - 110 mmol/L    CO2 20 (L) 21 - 32 mmol/L    Anion Gap 8 4 - 13 mmol/L    Glucose 192 (H) 65 - 100 mg/dL    BUN 60 (H) 8 - 23 MG/DL    Creatinine 1.50 (H) 0.6 - 1.0 MG/DL    Est, Glom Filt Rate 37 (L) >60 ml/min/1.73m2    Calcium 8.5 8.3 - 10.4 MG/DL   POCT Glucose    Collection Time: 10/08/22  4:38 PM   Result Value Ref Range    POC Glucose 194 (H) 65 - 100 mg/dL    Performed by: Rico Vazquez    Anti-Xa, Unfractionated Heparin    Collection Time: 10/08/22  6:56 PM   Result Value Ref Range    Anti-XA Unfrac Heparin 0.49 0.3 - 0.7 IU/mL   POCT Glucose    Collection Time: 10/08/22  8:26 PM   Result Value Ref Range    POC Glucose 210 (H) 65 - 100 mg/dL    Performed by: Mary Ellen    Basic Metabolic Panel    Collection Time: 10/08/22 10:43 PM   Result Value Ref Range    Sodium 154 (H) 136 - 145 mmol/L    Potassium 4.0 3.5 - 5.1 mmol/L    Chloride 129 (H) 101 - 110 mmol/L    CO2 17 (L) 21 - 32 mmol/L    Anion Gap 8 4 - 13 mmol/L    Glucose 286 (H) 65 - 100 mg/dL    BUN 56 (H) 8 - 23 MG/DL    Creatinine 1.50 (H) 0.6 - 1.0 MG/DL    Est, Glom Filt Rate 37 (L) >60 ml/min/1.73m2    Calcium 8.3 8.3 - 10.4 MG/DL   PLEASE READ & DOCUMENT PPD TEST IN 24 HRS    Collection Time: 10/09/22 12:00 AM   Result Value Ref Range    PPD, (POC) Negative Negative    mm Induration 0 0 - 5 mm   Anti-Xa, Unfractionated Heparin    Collection Time: 10/09/22  1:44 AM   Result Value Ref Range    Anti-XA Unfrac Heparin 0.36 0.3 - 0.7 IU/mL   CBC with Auto Differential    Collection Time: 10/09/22  1:44 AM   Result Value Ref Range    WBC 20.4 (H) 4.3 - 11.1 K/uL    RBC 4.88 4.05 - 5.2 M/uL    Hemoglobin 14.3 11.7 - 15.4 g/dL    Hematocrit 46.2 35.8 - 46.3 %    MCV 94.7 79.6 - 97.8 FL    MCH 29.3 26.1 - 32.9 PG    MCHC 31.0 (L) 31.4 - 35.0 g/dL    RDW 17.8 (H) 11.9 - 14.6 %    Platelets 114 (L) 942 - 450 K/uL    MPV 10.8 9.4 - 12.3 FL    nRBC 0.05 0.0 - 0.2 K/uL    Differential Type AUTOMATED      Seg Neutrophils 81 (H) 43 - 78 %    Lymphocytes 11 (L) 13 - 44 %    Monocytes 6 4.0 - 12.0 %    Eosinophils % 1 0.5 - 7.8 %    Basophils 0 0.0 - 2.0 %    Immature Granulocytes 1 0.0 - 5.0 %    Segs Absolute 16.5 (H) 1.7 - 8.2 K/UL    Absolute Lymph # 2.2 0.5 - 4.6 K/UL    Absolute Mono # 1.3 0.1 - 1.3 K/UL    Absolute Eos # 0.2 0.0 - 0.8 K/UL    Basophils Absolute 0.0 0.0 - 0.2 K/UL    Absolute Immature Granulocyte 0.2 0.0 - 0.5 K/UL   Basic Metabolic Panel    Collection Time: 10/09/22  1:44 AM   Result Value Ref Range    Sodium 153 (H) 136 - 145 mmol/L    Potassium 4.2 3.5 - 5.1 mmol/L    Chloride 128 (H) 101 - 110 mmol/L    CO2 19 (L) 21 - 32 mmol/L    Anion Gap 6 4 - 13 mmol/L    Glucose 266 (H) 65 - 100 mg/dL    BUN 55 (H) 8 - 23 MG/DL    Creatinine 1.60 (H) 0.6 - 1.0 MG/DL    Est, Glom Filt Rate 34 (L) >60 ml/min/1.73m2    Calcium 8.2 (L) 8.3 - 10.4 MG/DL   POCT Glucose    Collection Time: 10/09/22  8:17 AM   Result Value Ref Range    POC Glucose 197 (H) 65 - 100 mg/dL    Performed by: Constantine Hinojosa    Basic Metabolic Panel    Collection Time: 10/09/22  9:41 AM   Result Value Ref Range    Sodium 150 (H) 136 - 145 mmol/L    Potassium 3.8 3.5 - 5.1 mmol/L    Chloride 124 (H) 101 - 110 mmol/L    CO2 19 (L) 21 - 32 mmol/L    Anion Gap 7 4 - 13 mmol/L    Glucose 273 (H) 65 - 100 mg/dL    BUN 49 (H) 8 - 23 MG/DL    Creatinine 1.50 (H) 0.6 - 1.0 MG/DL    Est, Joannem Filt Rate 37 (L) >60 ml/min/1.73m2    Calcium 8.2 (L) 8.3 - 10.4 MG/DL   POCT Glucose    Collection Time: 10/09/22 11:38 AM   Result Value Ref Range    POC Glucose 227 (H) 65 - 100 mg/dL    Performed by: Gregorio Metz       [unfilled]     Image:  I have personally reviewed patients imaging showing  US RETROPERITONEAL COMPLETE   Final Result   1. No evidence of hydronephrosis. 2. 6.7 cm right kidney simple cyst.   3. Normal left kidney. 4. Known pelvic mass. XR CHEST PORTABLE   Final Result   The lungs are clear. The heart is normal in size. No pneumothorax. No pleural effusions.               Current Facility-Administered Medications   Medication Dose Route Frequency Provider Last Rate Last Admin    apixaban (ELIQUIS) tablet 5 mg  5 mg Oral BID Ronit Gerard MD   5 mg at 10/09/22 0829    vancomycin (VANCOCIN) 1,000 mg in sodium chloride 0.9 % 250 mL IVPB ([id number])  1,000 mg IntraVENous Q24H Neda Jameson MD        nystatin (MYCOSTATIN) 058104 UNIT/ML suspension 500,000 Units  5 mL Oral 4x Daily Neda Jameson MD   500,000 Units at 10/09/22 9523    metoprolol succinate (TOPROL XL) extended release tablet 50 mg  50 mg Oral Daily Ronit Gerard MD   50 mg at 10/09/22 0829    potassium chloride (KLOR-CON M) extended release tablet 40 mEq  40 mEq Oral BID  Edis Schmidt MD   40 mEq at 10/09/22 0830    levothyroxine (SYNTHROID) tablet 75 mcg  75 mcg Oral Daily Trinda Clarke, DO   75 mcg at 10/09/22 0522    memantine (NAMENDA) tablet 10 mg  10 mg Oral BID Trinda Clarke, DO   10 mg at 10/09/22 0829    miconazole (MICOTIN) 2 % powder   Topical BID Trinda Clarke, DO   Given at 10/09/22 8508    insulin lispro (HUMALOG) injection vial 0-8 Units  0-8 Units SubCUTAneous TID  Trinda Clarke, DO        insulin lispro (HUMALOG) injection vial 0-4 Units  0-4 Units SubCUTAneous Nightly Trinda Clarke, DO        glucose chewable tablet 16 g  4 tablet Oral PRN Trinda Clarke, DO MD     October 9, 2022

## 2022-10-09 NOTE — PROGRESS NOTES
Patient's Heparin Xa due at 1900. The lab was collected at [address] [place] [address]. Called lab to get the result to show. Was told that the result should show in a few minutes. 2234 Called lab again for result and  said, Agapito Jackman would look into it. \"    Will follow Heparin protocol when Xa results. 2343 Xa from 1856 resulted at 0.49. Will follow Heparin protocol.

## 2022-10-09 NOTE — PROGRESS NOTES
VANCO DAILY FOLLOW UP NOTE  [address] [facility] Pharmacokinetic Monitoring Service - Vancomycin    Consulting Provider: Smith Lau MD   Indication: UTI, sepsis  Target Concentration: Goal AUC/-600 mg*hr/L  Day of Therapy: 3  Additional Antimicrobials: ceftriaxone    Pertinent Laboratory Values: Wt Readings from Last 1 Encounters:   10/09/22 229 lb 1.6 oz (103.9 kg)     Temp Readings from Last 1 Encounters:   10/09/22 97.3 °F (36.3 °C) (Axillary)     Recent Labs     10/07/22  1114 10/07/22  1259 10/07/22  1644 10/07/22  2111 10/08/22  0518 10/08/22  0950 10/08/22  2243 10/09/22  0144 10/09/22  0941   BUN 53*  --  62*   < > 59*   < > 56* 55* 49*   CREATININE 1.90*  --  1.80*   < > 1.50*   < > 1.50* 1.60* 1.50*   WBC 17.8*  --   --   --  24.6*  --   --  20.4*  --    PROCAL 0.64*  --   --   --   --   --   --   --   --    LACACIDPL 5.7* 4.2* 2.1*  --  1.4  --   --   --   --     < > = values in this interval not displayed. Estimated Creatinine Clearance: 41 mL/min (A) (based on SCr of 1.5 mg/dL (H)). Lab Results   Component Value Date/Time    VANCORANDOM 25.0 10/08/2022 05:18 AM       MRSA Nasal Swab: N/A. Non-respiratory infection.       Assessment:  Date/Time Dose Concentration AUC         Note: Serum concentrations collected for AUC dosing may appear elevated if collected in close proximity to the dose administered, this is not necessarily an indication of toxicity    Plan:  Current dosing regimen is supra-therapeutic  Decrease dose to 1000 mg q24h for predicted AUC/Tr of 489/14.7  Repeat vancomycin concentrations will be ordered as clinically appropriate   Pharmacy will continue to monitor patient and adjust therapy as indicated    Thank you for the consult,  Lj Bailey, PharmD, BCOP  Clinical Pharmacist  Contact [employer]

## 2022-10-10 LAB
ANION GAP SERPL CALC-SCNC: 7 MMOL/L (ref 4–13)
ANION GAP SERPL CALC-SCNC: 8 MMOL/L (ref 4–13)
BACTERIA SPEC CULT: ABNORMAL
BACTERIA SPEC CULT: ABNORMAL
BASOPHILS # BLD: 0 K/UL (ref 0–0.2)
BASOPHILS NFR BLD: 0 % (ref 0–2)
BUN SERPL-MCNC: 38 MG/DL (ref 8–23)
BUN SERPL-MCNC: 40 MG/DL (ref 8–23)
CALCIUM SERPL-MCNC: 8.6 MG/DL (ref 8.3–10.4)
CALCIUM SERPL-MCNC: 8.7 MG/DL (ref 8.3–10.4)
CHLORIDE SERPL-SCNC: 116 MMOL/L (ref 101–110)
CHLORIDE SERPL-SCNC: 120 MMOL/L (ref 101–110)
CO2 SERPL-SCNC: 16 MMOL/L (ref 21–32)
CO2 SERPL-SCNC: 20 MMOL/L (ref 21–32)
CREAT SERPL-MCNC: 1.2 MG/DL (ref 0.6–1)
CREAT SERPL-MCNC: 1.3 MG/DL (ref 0.6–1)
DIFFERENTIAL METHOD BLD: ABNORMAL
EOSINOPHIL # BLD: 0.3 K/UL (ref 0–0.8)
EOSINOPHIL NFR BLD: 2 % (ref 0.5–7.8)
ERYTHROCYTE [DISTWIDTH] IN BLOOD BY AUTOMATED COUNT: 17.5 % (ref 11.9–14.6)
GLUCOSE BLD STRIP.AUTO-MCNC: 122 MG/DL (ref 65–100)
GLUCOSE BLD STRIP.AUTO-MCNC: 165 MG/DL (ref 65–100)
GLUCOSE BLD STRIP.AUTO-MCNC: 74 MG/DL (ref 65–100)
GLUCOSE BLD STRIP.AUTO-MCNC: 97 MG/DL (ref 65–100)
GLUCOSE SERPL-MCNC: 153 MG/DL (ref 65–100)
GLUCOSE SERPL-MCNC: 202 MG/DL (ref 65–100)
GRAM STN SPEC: ABNORMAL
HCT VFR BLD AUTO: 45.4 % (ref 35.8–46.3)
HGB BLD-MCNC: 14.1 G/DL (ref 11.7–15.4)
IMM GRANULOCYTES # BLD AUTO: 0.1 K/UL (ref 0–0.5)
IMM GRANULOCYTES NFR BLD AUTO: 1 % (ref 0–5)
LYMPHOCYTES # BLD: 1.9 K/UL (ref 0.5–4.6)
LYMPHOCYTES NFR BLD: 14 % (ref 13–44)
MCH RBC QN AUTO: 29.2 PG (ref 26.1–32.9)
MCHC RBC AUTO-ENTMCNC: 31.1 G/DL (ref 31.4–35)
MCV RBC AUTO: 94 FL (ref 79.6–97.8)
MONOCYTES # BLD: 0.6 K/UL (ref 0.1–1.3)
MONOCYTES NFR BLD: 4 % (ref 4–12)
NEUTS SEG # BLD: 10.8 K/UL (ref 1.7–8.2)
NEUTS SEG NFR BLD: 79 % (ref 43–78)
NRBC # BLD: 0.05 K/UL (ref 0–0.2)
PLATELET # BLD AUTO: 127 K/UL (ref 150–450)
PMV BLD AUTO: 11.7 FL (ref 9.4–12.3)
POTASSIUM SERPL-SCNC: 3.6 MMOL/L (ref 3.5–5.1)
POTASSIUM SERPL-SCNC: 4.3 MMOL/L (ref 3.5–5.1)
RBC # BLD AUTO: 4.83 M/UL (ref 4.05–5.2)
SERVICE CMNT-IMP: ABNORMAL
SERVICE CMNT-IMP: NORMAL
SERVICE CMNT-IMP: NORMAL
SODIUM SERPL-SCNC: 143 MMOL/L (ref 136–145)
SODIUM SERPL-SCNC: 144 MMOL/L (ref 136–145)
WBC # BLD AUTO: 13.8 K/UL (ref 4.3–11.1)

## 2022-10-10 PROCEDURE — 6370000000 HC RX 637 (ALT 250 FOR IP): Performed by: INTERNAL MEDICINE

## 2022-10-10 PROCEDURE — 2580000003 HC RX 258: Performed by: INTERNAL MEDICINE

## 2022-10-10 PROCEDURE — 85025 COMPLETE CBC W/AUTO DIFF WBC: CPT

## 2022-10-10 PROCEDURE — 6360000002 HC RX W HCPCS: Performed by: INTERNAL MEDICINE

## 2022-10-10 PROCEDURE — 6370000000 HC RX 637 (ALT 250 FOR IP): Performed by: FAMILY MEDICINE

## 2022-10-10 PROCEDURE — 1100000003 HC PRIVATE W/ TELEMETRY

## 2022-10-10 PROCEDURE — 80048 BASIC METABOLIC PNL TOTAL CA: CPT

## 2022-10-10 PROCEDURE — 76937 US GUIDE VASCULAR ACCESS: CPT

## 2022-10-10 PROCEDURE — 2580000003 HC RX 258: Performed by: STUDENT IN AN ORGANIZED HEALTH CARE EDUCATION/TRAINING PROGRAM

## 2022-10-10 PROCEDURE — 94761 N-INVAS EAR/PLS OXIMETRY MLT: CPT

## 2022-10-10 PROCEDURE — 36415 COLL VENOUS BLD VENIPUNCTURE: CPT

## 2022-10-10 PROCEDURE — 97162 PT EVAL MOD COMPLEX 30 MIN: CPT

## 2022-10-10 PROCEDURE — 2580000003 HC RX 258: Performed by: FAMILY MEDICINE

## 2022-10-10 PROCEDURE — 97530 THERAPEUTIC ACTIVITIES: CPT

## 2022-10-10 PROCEDURE — 82962 GLUCOSE BLOOD TEST: CPT

## 2022-10-10 RX ORDER — SODIUM CHLORIDE 9 MG/ML
INJECTION, SOLUTION INTRAVENOUS CONTINUOUS
Status: DISCONTINUED | OUTPATIENT
Start: 2022-10-10 | End: 2022-10-10

## 2022-10-10 RX ORDER — SODIUM CHLORIDE, SODIUM LACTATE, POTASSIUM CHLORIDE, CALCIUM CHLORIDE 600; 310; 30; 20 MG/100ML; MG/100ML; MG/100ML; MG/100ML
INJECTION, SOLUTION INTRAVENOUS CONTINUOUS
Status: DISCONTINUED | OUTPATIENT
Start: 2022-10-10 | End: 2022-10-11

## 2022-10-10 RX ADMIN — VANCOMYCIN HYDROCHLORIDE 1000 MG: 1 INJECTION, POWDER, LYOPHILIZED, FOR SOLUTION INTRAVENOUS at 21:49

## 2022-10-10 RX ADMIN — APIXABAN 5 MG: 5 TABLET, FILM COATED ORAL at 09:17

## 2022-10-10 RX ADMIN — SODIUM CHLORIDE, PRESERVATIVE FREE 10 ML: 5 INJECTION INTRAVENOUS at 09:23

## 2022-10-10 RX ADMIN — SODIUM CHLORIDE: 9 INJECTION, SOLUTION INTRAVENOUS at 09:10

## 2022-10-10 RX ADMIN — CEFTRIAXONE 2000 MG: 2 INJECTION, POWDER, FOR SOLUTION INTRAMUSCULAR; INTRAVENOUS at 11:45

## 2022-10-10 RX ADMIN — METOPROLOL SUCCINATE 50 MG: 25 TABLET, FILM COATED, EXTENDED RELEASE ORAL at 09:17

## 2022-10-10 RX ADMIN — SODIUM CHLORIDE, SODIUM LACTATE, POTASSIUM CHLORIDE, AND CALCIUM CHLORIDE: 600; 310; 30; 20 INJECTION, SOLUTION INTRAVENOUS at 17:34

## 2022-10-10 RX ADMIN — NYSTATIN 500000 UNITS: 100000 SUSPENSION ORAL at 21:49

## 2022-10-10 RX ADMIN — SODIUM CHLORIDE, PRESERVATIVE FREE 10 ML: 5 INJECTION INTRAVENOUS at 22:00

## 2022-10-10 RX ADMIN — LEVOTHYROXINE SODIUM 75 MCG: 0.05 TABLET ORAL at 05:36

## 2022-10-10 RX ADMIN — MEMANTINE 10 MG: 5 TABLET ORAL at 21:48

## 2022-10-10 RX ADMIN — APIXABAN 5 MG: 5 TABLET, FILM COATED ORAL at 21:49

## 2022-10-10 ASSESSMENT — PAIN SCALES - GENERAL
PAINLEVEL_OUTOF10: 0

## 2022-10-10 NOTE — PROGRESS NOTES
US Guided PIV access    Ultrasound was used to find the vein which was compressible and does not have any features of an artery or nerve bundle. Skin was cleaned and disinfected prior to IV puncture. Under real-time ultrasound guidance peripheral access was obtained in the left  antecubital fossa  using 22 G 1.00\" Peripheral IV catheter x 1 attempt. Blood return was present and IV flushed without difficulty. IV dressing applied, no immediate complications noted, and patient tolerated the procedure well.     Angel Aguiar RN, PCCN, VA-BC

## 2022-10-10 NOTE — PROGRESS NOTES
VANCO DAILY FOLLOW UP NOTE  [address] [facility] Pharmacokinetic Monitoring Service - Vancomycin    Consulting Provider: Mildred Carlisle MD   Indication: UTI, sepsis  Target Concentration: Goal AUC/-600 mg*hr/L  Day of Therapy: 4  Additional Antimicrobials: ceftriaxone    Pertinent Laboratory Values: Wt Readings from Last 1 Encounters:   10/10/22 235 lb 3.2 oz (106.7 kg)     Temp Readings from Last 1 Encounters:   10/10/22 97.6 °F (36.4 °C) (Oral)     Recent Labs     10/07/22  1644 10/07/22  2111 10/08/22  0518 10/08/22  0950 10/09/22  0144 10/09/22  0941 10/09/22  2239 10/10/22  0602 10/10/22  0953   BUN 62*   < > 59*   < > 55*   < > 46* 40* 38*   CREATININE 1.80*   < > 1.50*   < > 1.60*   < > 1.40* 1.30* 1.20*   WBC  --   --  24.6*  --  20.4*  --   --  13.8*  --    LACACIDPL 2.1*  --  1.4  --   --   --   --   --   --     < > = values in this interval not displayed. Estimated Creatinine Clearance: 52 mL/min (A) (based on SCr of 1.2 mg/dL (H)). Lab Results   Component Value Date/Time    VANCORANDOM 25.0 10/08/2022 05:18 AM       MRSA Nasal Swab: N/A. Non-respiratory infection.       Assessment:  Date/Time Dose Concentration AUC         Note: Serum concentrations collected for AUC dosing may appear elevated if collected in close proximity to the dose administered, this is not necessarily an indication of toxicity    Plan:  Current dosing regimen is therapeutic  Continue current dose of 1000 mg q24h  Repeat vancomycin concentration ordered for 10/11 @ 0400    Pharmacy will continue to monitor patient and adjust therapy as indicated    Thank you for the consult,  Ira Reid, [facility]

## 2022-10-10 NOTE — THERAPY EVALUATION
ACUTE PHYSICAL THERAPY GOALS:   (Developed with and agreed upon by patient and/or caregiver.)    (1.) John León  will move from supine to sit and sit to supine  with MINIMAL ASSIST within 7 treatment day(s). (2.) John León will transfer from bed to chair and chair to bed with MODERATE ASSIST using the least restrictive device within 7 treatment day(s). (3.) John León will perform sitting static and dynamic balance activities x 15 minutes with MINIMAL ASSIST to improve safety within 7 treatment day(s). (4.) John León will perform bilateral lower extremity exercises x 15 min for HEP with SUPERVISION to improve strength, endurance, and functional mobility within 7 treatment day(s). PHYSICAL THERAPY Initial Assessment, Daily Note, and AM  (Link to Caseload Tracking: PT Visit Days : 1  Acknowledge Orders  Time In/Out  PT Charge Capture  Rehab Caseload Tracker    John León is a 67 y.o. female   PRIMARY DIAGNOSIS: Acute sepsis ([address]  Hypernatremia [E87.0]  Atrial fibrillation with rapid ventricular response (HCC) [I48.91]  Acute kidney injury ([address] [N17.9]  Severe sepsis ([address] [A41.9, R65.20]  Urinary tract infection with hematuria, site unspecified [N39.0, R31.9]  Acute sepsis ([address] [A41.9]       Reason for Referral: Generalized Muscle Weakness (M62.81)  Other abnormalities of gait and mobility (R26.89)  Inpatient: Payor: MEDICARE / Plan: MEDICARE PART A AND B / Product Type: *No Product type* /     ASSESSMENT:     REHAB RECOMMENDATIONS:   Recommendation to date pending progress:  Setting:  Short-term Rehab    Equipment:    To Be Determined     ASSESSMENT:  Ms. Jono Lindsay  is a 67year old female who presents TRUNG with sepsis and tachycardia. Pt is very confused with flat affect, initially did not want to mobilize but agreed to sit EOB. This date pt performs mobility including bed mobility and sitting balance tasks with maxA.  Pt presents as functioning below her baseline, with deficits in mobility including transfers, gait, balance, and activity tolerance. Pt will benefit from skilled therapy services to address stated deficits to promote return to highest level of function, independence, and safety. Will continue to follow.      [facility] [address] [address] AM-PAC 6 Clicks Basic Mobility Inpatient Short Form  AM-PAC Mobility Inpatient   How much difficulty turning over in bed?: A Lot  How much difficulty sitting down on / standing up from a chair with arms?: A Lot  How much difficulty moving from lying on back to sitting on side of bed?: A Lot  How much help from another person moving to and from a bed to a chair?: Total  How much help from another person needed to walk in hospital room?: Total  How much help from another person for climbing 3-5 steps with a railing?: Total  AM-PAC Inpatient Mobility Raw Score : 9  AM-PAC Inpatient T-Scale Score : 30.55  Mobility Inpatient CMS 0-100% Score: 81.38  Mobility Inpatient CMS G-Code Modifier : CM    SUBJECTIVE:   Ms. Audi Myles states, \"I am exhausted\"     Social/Functional    Unclear, pt states she is mod(I) with rollator and lives alone, but this may not be accurate  OBJECTIVE:     PAIN: Agus Tiffany / O2: PRECAUTION / [place] / Maddie Papa:   Pre Treatment:   Pain Assessment: None - Denies Pain      Post Treatment: None Vitals        Oxygen  O2 Therapy: Room air   IV, Purewick, and Telemetry     RESTRICTIONS/PRECAUTIONS:  Restrictions/Precautions: Fall Risk                 GROSS EVALUATION: Intact Impaired (Comments):   AROM [x]     PROM [x]    Strength [] BLE strength grossly 3-/5   Balance [] Posture: Fair  Sitting - Static: Poor  Sitting - Dynamic: Poor   Posture [] Forward Head  Rounded Shoulders  Thoracic Kyphosis   Sensation [x]     Coordination [x]      Tone [x]     Edema [x]    Activity Tolerance [x]      []      COGNITION/  PERCEPTION: Intact Impaired (Comments):   Orientation [] Oriented to person only   Vision [x]     Hearing [x]     Cognition  [] Confusion and short term memory deficits     MOBILITY: I Mod I S SBA CGA Min Mod Max Total  NT x2 Comments:   Bed Mobility    Rolling [] [] [] [] [] [] [] [] [] [x] []    Supine to Sit [] [] [] [] [] [] [] [x] [] [] []    Scooting [] [] [] [] [] [] [] [x] [] [] []    Sit to Supine [] [] [] [] [] [] [] [x] [] [] []    Transfers    Sit to Stand [] [] [] [] [] [] [] [] [] [x] []    Bed to Chair [] [] [] [] [] [] [] [] [] [x] []    Stand to Sit [] [] [] [] [] [] [] [] [] [x] []     [] [] [] [] [] [] [] [] [] [] []    I=Independent, Mod I=Modified Independent, S=Supervision, SBA=Standby Assistance, CGA=Contact Guard Assistance,   Min=Minimal Assistance, Mod=Moderate Assistance, Max=Maximal Assistance, Total=Total Assistance, NT=Not Tested    GAIT: I Mod I S SBA CGA Min Mod Max Total  NT x2 Comments:   Level of Assistance [] [] [] [] [] [] [] [] [] [x] []    Distance   feet    DME N/A    Gait Quality N/A    Weightbearing Status Restrictions/Precautions  Restrictions/Precautions: Fall Risk    Stairs      I=Independent, Mod I=Modified Independent, S=Supervision, SBA=Standby Assistance, CGA=Contact Guard Assistance,   Min=Minimal Assistance, Mod=Moderate Assistance, Max=Maximal Assistance, Total=Total Assistance, NT=Not Tested    PLAN:   FREQUENCY AND DURATION: 3 times/week for duration of hospital stay or until stated goals are met, whichever comes first.    THERAPY PROGNOSIS: Fair    PROBLEM LIST:   (Skilled intervention is medically necessary to address:)  Decreased ADL/Functional Activities  Decreased Activity Tolerance  Decreased Balance  Decreased Cognition  Decreased Safety Awareness  Decreased Strength  Decreased Transfer Abilities INTERVENTIONS PLANNED:   (Benefits and precautions of physical therapy have been discussed with the patient.)  Therapeutic Activity  Therapeutic Exercise/HEP  Neuromuscular Re-education  Gait Training  Education       TREATMENT:   EVALUATION: MODERATE COMPLEXITY: (Untimed Charge)    TREATMENT:   Therapeutic Activity (10 Minutes): Therapeutic activity included Supine to Sit, Sit to Supine, Scooting, Lateral Scooting, and Sitting balance  to improve functional Activity tolerance, Balance, Coordination, Mobility, and Strength.     TREATMENT GRID:  N/A    AFTER TREATMENT PRECAUTIONS: Alarm Activated, Bed, Bed/Chair Locked, Call light within reach, Needs within reach, and RN notified    INTERDISCIPLINARY COLLABORATION:  RN/ PCT    EDUCATION: Education Given To: Patient  Education Provided: Role of Therapy;Plan of Care  Education Method: Verbal  Barriers to Learning: Cognition  Education Outcome: Unable to verbalize    TIME IN/OUT:  Time In: 1009  Time Out: 1031  Minutes: Eder Worthington, PT

## 2022-10-10 NOTE — PROGRESS NOTES
Progress Note    Patient: Berto Russell MRN: [id number]  SSN: [id number]    YOB: 1950  Age: 67 y.o. Sex: female      Admit Date: 10/7/2022    LOS: 3 days     Assessment and Plan:   71-year-old female with a past medical history of atrial fibrillation not on anticoagulation, history of colon cancer, dementia, diabetes, that presented in the setting of fevers    1. Sepsis concerning urinary tract infection and Staph bacteremia unclear source  Afebrile, leukocytosis is present  urine culture negative   -Continue ceftriaxone and vancomycin for now  Continue IV fluids  -Follow repeat blood cultures  -Echocardiogram without signs of IE  ID was consulted  Plan for removal of villar TODAY    Acute hypoxic respiratory failure  Patient is saturating well on 4 LNC in am and now on RA  Weaned off oxygen . RT assessed and patient is on room air, no oxygen needs    2. Atrial fibrillation with RVR  No need of MYLA at this time  S/p Cardizem drip and heparin drip  -Telemetry monitoring  -Continue metoprolol  Continue Eliquis  Cardiology signed off    3. Hypernatremia  Na improved  Stopped  D5  Added free water  -Nephrology recommendations appreciated    LACY   Creatinine is 1.4 and baseline is 0.8  hyperosmolarity serum osmolality 344  Cr improving with IVF   Ultrasound kidney shows simple cyst  Nephrology following, appreciate recommendations    4. Hypokalemia  Resolved     5. Thrush  -Continue nystatin    6. Diabetes mellitus  Uncontrolled hyperglycemia  Blood glucoses ranging around 200s  Hba1c 6.3  -Insulin sliding scale  -Blood sugar checks before meals and at bedtime    Thrombocytopenia  Platelet count is 331 7K    DVT prophylaxis Eliquis    Dispo anticipate hospital stay for 1 to 2 days and then STR      Subjective:   71-year-old female with a past medical history of atrial fibrillation not on anticoagulation, history of colon cancer, dementia, diabetes, that presented in the setting of fevers.     Patient seen and examined at bedside. This morning the patient is feeling much better. Denies any chest pain, no abdominal pain, no nausea or vomiting. Objective:     Vitals:    10/10/22 0459 10/10/22 0835 10/10/22 1004 10/10/22 1130   BP: 125/84 122/66  117/76   Pulse: 88 78 81 73   Resp: 22 20 18 18   Temp: 97.3 °F (36.3 °C) 98 °F (36.7 °C)  97.6 °F (36.4 °C)   TempSrc: Oral Oral  Oral   SpO2:  100% 97% 98%   Weight: 235 lb 3.2 oz (106.7 kg)      Height:            Intake and Output:  Current Shift: No intake/output data recorded.   Last three shifts: 10/08 1901 - 10/10 0700  In: 100 [P.O.:100]  Out: 1200 [Urine:1200]    ROS  10 ROS negative except from stated on subjective    Physical Exam:   General: Alert, oriented, NAD  HEENT: NC/AT, EOM are intact  Neck: supple, no JVD  Cardiovascular: RRR, S1, S2, no murmurs  Respiratory: Lungs are clear, no wheezes or rales  Abdomen: Soft, NT, ND  Back: No CVA tenderness, no paraspinal tenderness  Extremities: LE without pedal edema, no erythema  Neuro: A&O, CN are intact, no focal deficits  Skin: no rash or ulcers  Psych: good mood and affect    Lab/Data Review:  I have personally reviewed patients laboratory data showing  Recent Results (from the past 24 hour(s))   Basic Metabolic Panel    Collection Time: 10/09/22  4:25 PM   Result Value Ref Range    Sodium 147 (H) 136 - 145 mmol/L    Potassium 5.0 3.5 - 5.1 mmol/L    Chloride 125 (H) 101 - 110 mmol/L    CO2 16 (L) 21 - 32 mmol/L    Anion Gap 6 4 - 13 mmol/L    Glucose 231 (H) 65 - 100 mg/dL    BUN 47 (H) 8 - 23 MG/DL    Creatinine 1.40 (H) 0.6 - 1.0 MG/DL    Est, Glom Filt Rate 40 (L) >60 ml/min/1.73m2    Calcium 8.4 8.3 - 10.4 MG/DL   POCT Glucose    Collection Time: 10/09/22  4:48 PM   Result Value Ref Range    POC Glucose 222 (H) 65 - 100 mg/dL    Performed by: Isaias Leslie    POCT Glucose    Collection Time: 10/09/22  8:20 PM   Result Value Ref Range    POC Glucose 180 (H) 65 - 100 mg/dL    Performed by: Mary Ellen    Basic Metabolic Panel    Collection Time: 10/09/22 10:39 PM   Result Value Ref Range    Sodium 146 (H) 136 - 145 mmol/L    Potassium 4.1 3.5 - 5.1 mmol/L    Chloride 120 (H) 101 - 110 mmol/L    CO2 20 (L) 21 - 32 mmol/L    Anion Gap 6 4 - 13 mmol/L    Glucose 208 (H) 65 - 100 mg/dL    BUN 46 (H) 8 - 23 MG/DL    Creatinine 1.40 (H) 0.6 - 1.0 MG/DL    Est, Glom Filt Rate 40 (L) >60 ml/min/1.73m2    Calcium 8.5 8.3 - 10.4 MG/DL   Basic Metabolic Panel    Collection Time: 10/10/22  6:02 AM   Result Value Ref Range    Sodium 143 136 - 145 mmol/L    Potassium 3.6 3.5 - 5.1 mmol/L    Chloride 116 (H) 101 - 110 mmol/L    CO2 20 (L) 21 - 32 mmol/L    Anion Gap 7 4 - 13 mmol/L    Glucose 202 (H) 65 - 100 mg/dL    BUN 40 (H) 8 - 23 MG/DL    Creatinine 1.30 (H) 0.6 - 1.0 MG/DL    Est, Glom Filt Rate 43 (L) >60 ml/min/1.73m2    Calcium 8.6 8.3 - 10.4 MG/DL   CBC with Auto Differential    Collection Time: 10/10/22  6:02 AM   Result Value Ref Range    WBC 13.8 (H) 4.3 - 11.1 K/uL    RBC 4.83 4.05 - 5.2 M/uL    Hemoglobin 14.1 11.7 - 15.4 g/dL    Hematocrit 45.4 35.8 - 46.3 %    MCV 94.0 79.6 - 97.8 FL    MCH 29.2 26.1 - 32.9 PG    MCHC 31.1 (L) 31.4 - 35.0 g/dL    RDW 17.5 (H) 11.9 - 14.6 %    Platelets 684 (L) 629 - 450 K/uL    MPV 11.7 9.4 - 12.3 FL    nRBC 0.05 0.0 - 0.2 K/uL    Differential Type AUTOMATED      Seg Neutrophils 79 (H) 43 - 78 %    Lymphocytes 14 13 - 44 %    Monocytes 4 4.0 - 12.0 %    Eosinophils % 2 0.5 - 7.8 %    Basophils 0 0.0 - 2.0 %    Immature Granulocytes 1 0.0 - 5.0 %    Segs Absolute 10.8 (H) 1.7 - 8.2 K/UL    Absolute Lymph # 1.9 0.5 - 4.6 K/UL    Absolute Mono # 0.6 0.1 - 1.3 K/UL    Absolute Eos # 0.3 0.0 - 0.8 K/UL    Basophils Absolute 0.0 0.0 - 0.2 K/UL    Absolute Immature Granulocyte 0.1 0.0 - 0.5 K/UL   POCT Glucose    Collection Time: 10/10/22  7:18 AM   Result Value Ref Range    POC Glucose 165 (H) 65 - 100 mg/dL    Performed by: Satish Hernandez    Basic Metabolic Panel Collection Time: 10/10/22  9:53 AM   Result Value Ref Range    Sodium 144 136 - 145 mmol/L    Potassium 4.3 3.5 - 5.1 mmol/L    Chloride 120 (H) 101 - 110 mmol/L    CO2 16 (L) 21 - 32 mmol/L    Anion Gap 8 4 - 13 mmol/L    Glucose 153 (H) 65 - 100 mg/dL    BUN 38 (H) 8 - 23 MG/DL    Creatinine 1.20 (H) 0.6 - 1.0 MG/DL    Est, Glom Filt Rate 48 (L) >60 ml/min/1.73m2    Calcium 8.7 8.3 - 10.4 MG/DL   POCT Glucose    Collection Time: 10/10/22 11:32 AM   Result Value Ref Range    POC Glucose 122 (H) 65 - 100 mg/dL    Performed by: Hudson       @[employer]@     Image:  I have personally reviewed patients imaging showing  US RETROPERITONEAL COMPLETE   Final Result   1. No evidence of hydronephrosis. 2. 6.7 cm right kidney simple cyst.   3. Normal left kidney. 4. Known pelvic mass. XR CHEST PORTABLE   Final Result   The lungs are clear. The heart is normal in size. No pneumothorax. No pleural effusions.               Current Facility-Administered Medications   Medication Dose Route Frequency Provider Last Rate Last Admin    0.9 % sodium chloride infusion   IntraVENous Continuous Shiloh Bryant MD   Stopped at 10/10/22 1344    apixaban (ELIQUIS) tablet 5 mg  5 mg Oral BID Junie Ennis MD   5 mg at 10/10/22 0917    vancomycin (VANCOCIN) 1,000 mg in sodium chloride 0.9 % 250 mL IVPB ([id number])  1,000 mg IntraVENous Q24H Loida Jones MD   Stopped at 10/09/22 2215    cefTRIAXone (ROCEPHIN) 2,000 mg in sodium chloride 0.9 % 50 mL IVPB mini-bag  2,000 mg IntraVENous Q24H Loida Jones MD   Stopped at 10/10/22 1220    nystatin (MYCOSTATIN) 440256 UNIT/ML suspension 500,000 Units  5 mL Oral 4x Daily Loida Jones MD   500,000 Units at 10/09/22 1556    metoprolol succinate (TOPROL XL) extended release tablet 50 mg  50 mg Oral Daily Junie Ennis MD   50 mg at 10/10/22 0917    potassium chloride (KLOR-CON M) extended release tablet 40 mEq  40 mEq Oral BID  Beryle Bullion, MD   40 mEq at 10/09/22 1556    levothyroxine (SYNTHROID) tablet 75 mcg  75 mcg Oral Daily Perla Fisher, DO   75 mcg at 10/10/22 0536    memantine (NAMENDA) tablet 10 mg  10 mg Oral BID Perla Fisher, DO   10 mg at 10/09/22 2048    miconazole (MICOTIN) 2 % powder   Topical BID Perla Fisher, DO   Given at 10/09/22 2049    insulin lispro (HUMALOG) injection vial 0-8 Units  0-8 Units SubCUTAneous TID WC Perla Fisher, DO   2 Units at 10/09/22 1723    insulin lispro (HUMALOG) injection vial 0-4 Units  0-4 Units SubCUTAneous Nightly Perla Fisher, DO        glucose chewable tablet 16 g  4 tablet Oral PRN Perla Fisher, DO        dextrose bolus 10% 125 mL  125 mL IntraVENous PRN Perla Fisher, DO        Or    dextrose bolus 10% 250 mL  250 mL IntraVENous PRN Perla Fisher, DO        glucagon (rDNA) injection 1 mg  1 mg SubCUTAneous PRN Perla Fisher, DO        dextrose 10 % infusion   IntraVENous Continuous PRN Perla Fisher, DO        sodium chloride flush 0.9 % injection 5-40 mL  5-40 mL IntraVENous 2 times per day Perla Fisher, DO   10 mL at 10/10/22 6739    sodium chloride flush 0.9 % injection 5-40 mL  5-40 mL IntraVENous PRN Perla Fisher, DO        0.9 % sodium chloride infusion   IntraVENous PRN Perla Fisher, DO        polyethylene glycol (GLYCOLAX) packet 17 g  17 g Oral Daily PRN Perla Fisher, DO        acetaminophen (TYLENOL) tablet 650 mg  650 mg Oral Q6H PRN Perla Fisher, DO        Or    acetaminophen (TYLENOL) suppository 650 mg  650 mg Rectal Q6H PRN Perla Fisher, DO            Hospital problems     Patient Active Problem List   Diagnosis    Dementia without behavioral disturbance (HCC)    Type 2 diabetes with nephropathy ([address]    Uterine mass    Atrial fibrillation with RVR (HCC)    Chronic anticoagulation    Acquired hypothyroidism    Hypercholesterolemia    Severe obesity (BMI 35.0-39. 9) with comorbidity ([address]    Inability to walk    Acute pain of left knee    Fall from ground level    Acute sepsis [facility]    LACY (acute kidney injury) ([facility]    Metabolic acidosis    Transaminitis    Hypernatremia        I have reviewed, updated, and verified this note's content and spent 36 minutes of my 40 minutes visit performing counseling and coordination of care regarding medical management.        Signed By: Louie Cobb MD     October 10, 2022

## 2022-10-10 NOTE — PROGRESS NOTES
Jaya Jameson  Admission Date: 10/7/2022         [address] Nephrology Progress Note: 10/10/2022    Follow-up for: LACY    The patient's chart is reviewed and the patient is discussed with the staff.     Subjective:     Not talking much secondary to her dementia  Sister present bedside  Rob catheter with tea colored good urine output seen    ROS:  Gen - no fever, no chills, appetite unchanged  CV - no chest pain, no palpitation  Lung - no shortness of breath, no cough  Abd - no tenderness, no nausea/vomiting, no diarrhea  Ext - no edema    Current Facility-Administered Medications   Medication Dose Route Frequency    0.9 % sodium chloride infusion   IntraVENous Continuous    apixaban (ELIQUIS) tablet 5 mg  5 mg Oral BID    vancomycin (VANCOCIN) 1,000 mg in sodium chloride 0.9 % 250 mL IVPB ([id number])  1,000 mg IntraVENous Q24H    cefTRIAXone (ROCEPHIN) 2,000 mg in sodium chloride 0.9 % 50 mL IVPB mini-bag  2,000 mg IntraVENous Q24H    nystatin (MYCOSTATIN) 270754 UNIT/ML suspension 500,000 Units  5 mL Oral 4x Daily    metoprolol succinate (TOPROL XL) extended release tablet 50 mg  50 mg Oral Daily    potassium chloride (KLOR-CON M) extended release tablet 40 mEq  40 mEq Oral BID WC    levothyroxine (SYNTHROID) tablet 75 mcg  75 mcg Oral Daily    memantine (NAMENDA) tablet 10 mg  10 mg Oral BID    miconazole (MICOTIN) 2 % powder   Topical BID    insulin lispro (HUMALOG) injection vial 0-8 Units  0-8 Units SubCUTAneous TID WC    insulin lispro (HUMALOG) injection vial 0-4 Units  0-4 Units SubCUTAneous Nightly    glucose chewable tablet 16 g  4 tablet Oral PRN    dextrose bolus 10% 125 mL  125 mL IntraVENous PRN    Or    dextrose bolus 10% 250 mL  250 mL IntraVENous PRN    glucagon (rDNA) injection 1 mg  1 mg SubCUTAneous PRN    dextrose 10 % infusion   IntraVENous Continuous PRN    sodium chloride flush 0.9 % injection 5-40 mL  5-40 mL IntraVENous 2 times per day    sodium chloride flush 0.9 % injection 5-40 mL  5-40 mL IntraVENous PRN    0.9 % sodium chloride infusion   IntraVENous PRN    polyethylene glycol (GLYCOLAX) packet 17 g  17 g Oral Daily PRN    acetaminophen (TYLENOL) tablet 650 mg  650 mg Oral Q6H PRN    Or    acetaminophen (TYLENOL) suppository 650 mg  650 mg Rectal Q6H PRN         Objective:     Vitals:    10/10/22 0459 10/10/22 0835 10/10/22 1004 10/10/22 1130   BP: 125/84 122/66  117/76   Pulse: 88 78 81 73   Resp: 22 20 18 18   Temp: 97.3 °F (36.3 °C) 98 °F (36.7 °C)  97.6 °F (36.4 °C)   TempSrc: Oral Oral  Oral   SpO2:  100% 97% 98%   Weight: 235 lb 3.2 oz (106.7 kg)      Height:         Intake and Output:   10/08 1901 - 10/10 0700  In: 100 [P.O.:100]  Out: 1200 [Urine:1200]  No intake/output data recorded. Physical Exam:   Constitutional:  the patient is well developed and in no acute distress  HEENT:  Sclera clear, pupils equal, oral mucosa moist  Lungs: Clear  Cardiovascular:  RRR without M,G,R  Abd/GI: soft and non-tender; with positive bowel sounds. Ext: warm without cyanosis. There is no lower leg edema. US RETROPERITONEAL COMPLETE   Final Result   1. No evidence of hydronephrosis. 2. 6.7 cm right kidney simple cyst.   3. Normal left kidney. 4. Known pelvic mass. XR CHEST PORTABLE   Final Result   The lungs are clear. The heart is normal in size. No pneumothorax. No pleural effusions. LAB  Recent Labs     10/08/22  0518 10/09/22  0144 10/10/22  0602   WBC 24.6* 20.4* 13.8*   HGB 15.8* 14.3 14.1   HCT 50.4* 46.2 45.4   * 138* 127*       Recent Labs     10/09/22  2239 10/10/22  0602 10/10/22  0953   * 143 144   K 4.1 3.6 4.3   * 116* 120*   CO2 20* 20* 16*   BUN 46* 40* 38*   CREATININE 1.40* 1.30* 1.20*         No results for input(s): PH, PCO2, PO2, HCO3 in the last 72 hours. Plan:  (Medical Decision Making)     1.   LACY  Your volume contraction seen with hyperosmolarity serum osmolality 344  Improving with hydration   Change IVF with LR         2. Hypernatremia  Corrected         3. Hypokalemia  Corrected         . 4. UTI? Advised to de-escalate as soon as possible vancomycin      5.   A. fib RVR

## 2022-10-10 NOTE — PLAN OF CARE
Problem: Discharge Planning  Goal: Discharge to home or other facility with appropriate resources  Outcome: Progressing     Problem: Skin/Tissue Integrity  Goal: Absence of new skin breakdown  Description: 1. Monitor for areas of redness and/or skin breakdown  2. Assess vascular access sites hourly  3. Every 4-6 hours minimum:  Change oxygen saturation probe site  4. Every 4-6 hours:  If on nasal continuous positive airway pressure, respiratory therapy assess nares and determine need for appliance change or resting period.   Outcome: Progressing     Problem: Safety - Adult  Goal: Free from fall injury  Outcome: Progressing  Flowsheets (Taken 10/9/2022 1600 by Marianne Steinberg RN)  Free From Fall Injury: Instruct family/caregiver on patient safety     Problem: Cardiovascular - Adult  Goal: Maintains optimal cardiac output and hemodynamic stability  Outcome: Progressing  Goal: Absence of cardiac dysrhythmias or at baseline  Outcome: Progressing     Problem: Metabolic/Fluid and Electrolytes - Adult  Goal: Electrolytes maintained within normal limits  Outcome: Progressing  Goal: Hemodynamic stability and optimal renal function maintained  Outcome: Progressing  Goal: Glucose maintained within prescribed range  Outcome: Progressing     Problem: Pain  Goal: Verbalizes/displays adequate comfort level or baseline comfort level  Outcome: Progressing

## 2022-10-10 NOTE — PROGRESS NOTES
Oxygen Qualifier       Room air: SpO2 with O2 and liter flow   Resting SpO2  97%    Ambulating SpO2        Pt. Is not able to ambulate.     Completed by:    Qasim Langley RCP

## 2022-10-11 ENCOUNTER — APPOINTMENT (OUTPATIENT)
Dept: MRI IMAGING | Age: 72
DRG: 872 | End: 2022-10-11
Payer: MEDICARE

## 2022-10-11 PROBLEM — Z86.59 HISTORY OF DEMENTIA: Status: ACTIVE | Noted: 2022-10-11

## 2022-10-11 PROBLEM — R41.0 CONFUSION AND DISORIENTATION: Status: ACTIVE | Noted: 2022-01-01

## 2022-10-11 PROBLEM — R41.3 MEMORY DIFFICULTIES: Status: ACTIVE | Noted: 2022-01-01

## 2022-10-11 LAB
ALBUMIN SERPL-MCNC: 2.8 G/DL (ref 3.2–4.6)
ALBUMIN/GLOB SERPL: 1 {RATIO} (ref 0.4–1.6)
ALP SERPL-CCNC: 90 U/L (ref 50–136)
ALT SERPL-CCNC: 75 U/L (ref 12–65)
ANION GAP SERPL CALC-SCNC: 5 MMOL/L (ref 2–11)
AST SERPL-CCNC: 51 U/L (ref 15–37)
BASOPHILS # BLD: 0 K/UL (ref 0–0.2)
BASOPHILS NFR BLD: 0 % (ref 0–2)
BILIRUB SERPL-MCNC: 0.7 MG/DL (ref 0.2–1.1)
BUN SERPL-MCNC: 32 MG/DL (ref 8–23)
CALCIUM SERPL-MCNC: 8.5 MG/DL (ref 8.3–10.4)
CHLORIDE SERPL-SCNC: 118 MMOL/L (ref 101–110)
CO2 SERPL-SCNC: 23 MMOL/L (ref 21–32)
CREAT SERPL-MCNC: 1 MG/DL (ref 0.6–1)
DIFFERENTIAL METHOD BLD: ABNORMAL
EKG ATRIAL RATE: 102 BPM
EKG DIAGNOSIS: NORMAL
EKG Q-T INTERVAL: 374 MS
EKG QRS DURATION: 78 MS
EKG QTC CALCULATION (BAZETT): 457 MS
EKG R AXIS: -17 DEGREES
EKG T AXIS: -29 DEGREES
EKG VENTRICULAR RATE: 90 BPM
EOSINOPHIL # BLD: 0.2 K/UL (ref 0–0.8)
EOSINOPHIL NFR BLD: 2 % (ref 0.5–7.8)
ERYTHROCYTE [DISTWIDTH] IN BLOOD BY AUTOMATED COUNT: 17.2 % (ref 11.9–14.6)
GLOBULIN SER CALC-MCNC: 2.7 G/DL (ref 2.8–4.5)
GLUCOSE BLD STRIP.AUTO-MCNC: 71 MG/DL (ref 65–100)
GLUCOSE SERPL-MCNC: 70 MG/DL (ref 65–100)
HCT VFR BLD AUTO: 43.7 % (ref 35.8–46.3)
HGB BLD-MCNC: 13.7 G/DL (ref 11.7–15.4)
IMM GRANULOCYTES # BLD AUTO: 0.1 K/UL (ref 0–0.5)
IMM GRANULOCYTES NFR BLD AUTO: 1 % (ref 0–5)
LYMPHOCYTES # BLD: 2 K/UL (ref 0.5–4.6)
LYMPHOCYTES NFR BLD: 18 % (ref 13–44)
MCH RBC QN AUTO: 29 PG (ref 26.1–32.9)
MCHC RBC AUTO-ENTMCNC: 31.4 G/DL (ref 31.4–35)
MCV RBC AUTO: 92.4 FL (ref 82–102)
MONOCYTES # BLD: 0.6 K/UL (ref 0.1–1.3)
MONOCYTES NFR BLD: 6 % (ref 4–12)
NEUTS SEG # BLD: 8.1 K/UL (ref 1.7–8.2)
NEUTS SEG NFR BLD: 73 % (ref 43–78)
NRBC # BLD: 0.03 K/UL (ref 0–0.2)
PHOSPHATE SERPL-MCNC: 2.4 MG/DL (ref 2.3–3.7)
PLATELET # BLD AUTO: 143 K/UL (ref 150–450)
PMV BLD AUTO: 11.8 FL (ref 9.4–12.3)
POTASSIUM SERPL-SCNC: 4.3 MMOL/L (ref 3.5–5.1)
PROT SERPL-MCNC: 5.5 G/DL (ref 6.3–8.2)
RBC # BLD AUTO: 4.73 M/UL (ref 4.05–5.2)
SERVICE CMNT-IMP: NORMAL
SODIUM SERPL-SCNC: 146 MMOL/L (ref 133–143)
VANCOMYCIN SERPL-MCNC: 22.5 UG/ML
WBC # BLD AUTO: 11 K/UL (ref 4.3–11.1)

## 2022-10-11 PROCEDURE — 97535 SELF CARE MNGMENT TRAINING: CPT

## 2022-10-11 PROCEDURE — 36415 COLL VENOUS BLD VENIPUNCTURE: CPT

## 2022-10-11 PROCEDURE — 6360000002 HC RX W HCPCS: Performed by: STUDENT IN AN ORGANIZED HEALTH CARE EDUCATION/TRAINING PROGRAM

## 2022-10-11 PROCEDURE — 2580000003 HC RX 258: Performed by: INTERNAL MEDICINE

## 2022-10-11 PROCEDURE — 6370000000 HC RX 637 (ALT 250 FOR IP): Performed by: INTERNAL MEDICINE

## 2022-10-11 PROCEDURE — 87040 BLOOD CULTURE FOR BACTERIA: CPT

## 2022-10-11 PROCEDURE — 2580000003 HC RX 258: Performed by: STUDENT IN AN ORGANIZED HEALTH CARE EDUCATION/TRAINING PROGRAM

## 2022-10-11 PROCEDURE — 93005 ELECTROCARDIOGRAM TRACING: CPT | Performed by: STUDENT IN AN ORGANIZED HEALTH CARE EDUCATION/TRAINING PROGRAM

## 2022-10-11 PROCEDURE — 85025 COMPLETE CBC W/AUTO DIFF WBC: CPT

## 2022-10-11 PROCEDURE — 99223 1ST HOSP IP/OBS HIGH 75: CPT | Performed by: NURSE PRACTITIONER

## 2022-10-11 PROCEDURE — 6370000000 HC RX 637 (ALT 250 FOR IP): Performed by: FAMILY MEDICINE

## 2022-10-11 PROCEDURE — 70551 MRI BRAIN STEM W/O DYE: CPT

## 2022-10-11 PROCEDURE — 84100 ASSAY OF PHOSPHORUS: CPT

## 2022-10-11 PROCEDURE — 6360000002 HC RX W HCPCS: Performed by: INTERNAL MEDICINE

## 2022-10-11 PROCEDURE — 2580000003 HC RX 258: Performed by: FAMILY MEDICINE

## 2022-10-11 PROCEDURE — 6370000000 HC RX 637 (ALT 250 FOR IP): Performed by: STUDENT IN AN ORGANIZED HEALTH CARE EDUCATION/TRAINING PROGRAM

## 2022-10-11 PROCEDURE — 2400000000

## 2022-10-11 PROCEDURE — 80202 ASSAY OF VANCOMYCIN: CPT

## 2022-10-11 PROCEDURE — 97530 THERAPEUTIC ACTIVITIES: CPT

## 2022-10-11 PROCEDURE — 97112 NEUROMUSCULAR REEDUCATION: CPT

## 2022-10-11 PROCEDURE — 1100000003 HC PRIVATE W/ TELEMETRY

## 2022-10-11 PROCEDURE — 80053 COMPREHEN METABOLIC PANEL: CPT

## 2022-10-11 PROCEDURE — 90792 PSYCH DIAG EVAL W/MED SRVCS: CPT | Performed by: NURSE PRACTITIONER

## 2022-10-11 PROCEDURE — 2500000003 HC RX 250 WO HCPCS: Performed by: FAMILY MEDICINE

## 2022-10-11 PROCEDURE — 82962 GLUCOSE BLOOD TEST: CPT

## 2022-10-11 RX ORDER — DEXTROSE AND SODIUM CHLORIDE 5; .45 G/100ML; G/100ML
INJECTION, SOLUTION INTRAVENOUS CONTINUOUS
Status: DISCONTINUED | OUTPATIENT
Start: 2022-10-11 | End: 2022-10-13 | Stop reason: HOSPADM

## 2022-10-11 RX ORDER — TAMSULOSIN HYDROCHLORIDE 0.4 MG/1
0.4 CAPSULE ORAL DAILY
Status: DISCONTINUED | OUTPATIENT
Start: 2022-10-11 | End: 2022-10-13 | Stop reason: HOSPADM

## 2022-10-11 RX ORDER — LORAZEPAM 2 MG/ML
1 INJECTION INTRAMUSCULAR ONCE
Status: COMPLETED | OUTPATIENT
Start: 2022-10-11 | End: 2022-10-11

## 2022-10-11 RX ADMIN — DEXTROSE AND SODIUM CHLORIDE: 5; 450 INJECTION, SOLUTION INTRAVENOUS at 10:55

## 2022-10-11 RX ADMIN — LEVOTHYROXINE SODIUM 75 MCG: 0.05 TABLET ORAL at 06:15

## 2022-10-11 RX ADMIN — LORAZEPAM 1 MG: 2 INJECTION INTRAMUSCULAR at 18:58

## 2022-10-11 RX ADMIN — TAMSULOSIN HYDROCHLORIDE 0.4 MG: 0.4 CAPSULE ORAL at 08:36

## 2022-10-11 RX ADMIN — SODIUM CHLORIDE, PRESERVATIVE FREE 10 ML: 5 INJECTION INTRAVENOUS at 21:10

## 2022-10-11 RX ADMIN — METOPROLOL SUCCINATE 50 MG: 25 TABLET, FILM COATED, EXTENDED RELEASE ORAL at 08:36

## 2022-10-11 RX ADMIN — NYSTATIN 500000 UNITS: 100000 SUSPENSION ORAL at 21:10

## 2022-10-11 RX ADMIN — CEFTRIAXONE 2000 MG: 2 INJECTION, POWDER, FOR SOLUTION INTRAMUSCULAR; INTRAVENOUS at 11:30

## 2022-10-11 RX ADMIN — MEMANTINE 10 MG: 5 TABLET ORAL at 21:09

## 2022-10-11 RX ADMIN — APIXABAN 5 MG: 5 TABLET, FILM COATED ORAL at 08:36

## 2022-10-11 RX ADMIN — SODIUM CHLORIDE, PRESERVATIVE FREE 10 ML: 5 INJECTION INTRAVENOUS at 08:41

## 2022-10-11 RX ADMIN — VANCOMYCIN HYDROCHLORIDE 1250 MG: 100 INJECTION, POWDER, LYOPHILIZED, FOR SOLUTION INTRAVENOUS at 21:10

## 2022-10-11 RX ADMIN — MEMANTINE 10 MG: 5 TABLET ORAL at 08:36

## 2022-10-11 RX ADMIN — ANTI-FUNGAL POWDER MICONAZOLE NITRATE TALC FREE: 1.42 POWDER TOPICAL at 21:11

## 2022-10-11 RX ADMIN — ANTI-FUNGAL POWDER MICONAZOLE NITRATE TALC FREE: 1.42 POWDER TOPICAL at 01:44

## 2022-10-11 RX ADMIN — APIXABAN 5 MG: 5 TABLET, FILM COATED ORAL at 21:10

## 2022-10-11 ASSESSMENT — PAIN SCALES - GENERAL: PAINLEVEL_OUTOF10: 0

## 2022-10-11 NOTE — PROGRESS NOTES
Infectious Disease Chart Review    Today's Date: 10/11/2022   Admit Date: 10/7/2022    Patient not seen d/t off floor, chart reviewed. Patient not charged for encounter. Impression:   SIRS / Sepsis - presumed due to UTI due to UA  TTE on 10/08 with no evidence of valvular vegetation  MRI of abdomen to r/o intraabdominal source of infection - results pending  Coag-negative Staph bacteremia (10/7) - one of two  Repeat BC ordered today - follow results  Soft tissue pelvic mass - suspected to be endometrial / fibroid - followed by Dr. Elvin Melendrez fibrillation with RVR  LACY - improving with hydration  Dementia    Plan:     MRI of abdomen and brain currently pending - follow results. Repeat BC ordered today - follow results  Continue vanc and ceftriaxone     Anti-infectives:   IV vanc  IV ceftriaxone    Subjective:       Patient is a 67 y.o. female with h/o dementia who presented from her TRUNG with fever and rapid atrial fibrillation. Her admission urinalysis was concerning for UTI but admission UCX grew mixed hui. Admission [address] is positive for CoNS in 1/2 bottles. She is known to have a uterine mass that has been imaged recently at 15cm. Her fevers have improved on vancomycin plus ceftriaxone. ID is consulted for further recommendations. Patient Active Problem List   Diagnosis    Dementia without behavioral disturbance (HCC)    Type 2 diabetes with nephropathy (HCC)    Uterine mass    Atrial fibrillation with RVR (HCC)    Chronic anticoagulation    Acquired hypothyroidism    Hypercholesterolemia    Severe obesity (BMI 35.0-39. 9) with comorbidity ([address]    Inability to walk    Acute pain of left knee    Fall from ground level    Acute sepsis ([address]    LACY (acute kidney injury) ([address]    Metabolic acidosis    Transaminitis    Hypernatremia     Past Medical History:   Diagnosis Date    Atrial fibrillation ([address]     Colon cancer ([address] 1999    Dementia ([address]     with memory loss    Diabetes ([address]     Menopause

## 2022-10-11 NOTE — PROGRESS NOTES
ACUTE OCCUPATIONAL THERAPY GOALS:   (Developed with and agreed upon by patient and/or caregiver.)  1. Patient will complete lower body bathing and dressing with min A and adaptive equipment as needed. 2. Patient will complete toileting with min A.   3. Patient will tolerate 30 minutes of OT treatment with 1-2 rest breaks to increase activity tolerance for ADLs. 4. Patient will complete functional transfers with min A and adaptive equipment as needed. 5. Patient will complete functional activity while seated edge of bed with SBA and adaptive equipment as needed. 6. Patient will tolerate 30 minutes BUE therapeutic activities to increase use of BUE during ADL performance. Timeframe: 7 visits        OCCUPATIONAL THERAPY: Daily Note PM   OT Visit Days: 2   Time  OT Charge Capture  Rehab Caseload Tracker  OT Orders    John León is a 67 y.o. female   PRIMARY DIAGNOSIS: Acute sepsis ([address]  Hypernatremia [E87.0]  Atrial fibrillation with rapid ventricular response (HCC) [I48.91]  Acute kidney injury ([address] [N17.9]  Severe sepsis ([address] [A41.9, R65.20]  Urinary tract infection with hematuria, site unspecified [N39.0, R31.9]  Acute sepsis ([address] [A41.9]       Inpatient: Payor: MEDICARE / Plan: MEDICARE PART A AND B / Product Type: *No Product type* /     ASSESSMENT:     REHAB RECOMMENDATIONS: CURRENT LEVEL OF FUNCTION:  (Most Recently Demonstrated)   Recommendation to date pending progress:  Setting:  Short-term Rehab    Equipment:    To Be Determined Bathing: Total Assist  Dressing:  Maximal Assist  Feeding/Grooming:  Not Tested  Toileting: Total Assist  Functional Mobility:  Maximal Assist x2     ASSESSMENT:  Ms. Jono Lindsay is doing fair today. Pt continue to be confused but cooperative for therapy this PM. Pt required max A x2 for bed mobility. Pt demonstrates fair (-) sitting balance at EOB at EOB. Pt became tired and wanted to return to supine. Pt noted to have soiled brief.  Pt is dependent for bowel hygiene and brief placement. Required max A for rolling. Pt left comfortable with all needs in reach. Minimal progress made today. Will continue to benefit from skilled OT during stay.        SUBJECTIVE:     Ms. Jennifer Sevilla states, \"Can you scratch my back\"     Social/Functional Lives With: (P) Alone  Type of Home: (P) Assisted living  Home Layout: (P) One level  Home Equipment: (P) Walker, rolling, Wheelchair-manual  Receives Help From: (P) Family  ADL Assistance: (P) Needs assistance  Ambulation Assistance: (P) Needs assistance  Active : (P) No  Occupation: (P) Retired    OBJECTIVE:     LINES / Jessica Butt / Kati Burows: NA    RESTRICTIONS/PRECAUTIONS:  Restrictions/Precautions  Restrictions/Precautions: Fall Risk        PAIN: VITALS / O2:   Pre Treatment:            Post Treatment: 0 Vitals          Oxygen        MOBILITY: I Mod I S SBA CGA Min Mod Max Total  NT x2 Comments:   Bed Mobility    Rolling [] [] [] [] [] [] [] [x] [] [] []    Supine to Sit [] [] [] [] [] [] [x] [x] [] [] [x]    Scooting [] [] [] [] [] [] [] [x] [] [] []    Sit to Supine [] [] [] [] [] [] [x] [x] [] [] [x]    Transfers    Sit to Stand [] [] [] [] [] [] [] [] [] [x] []    Bed to Chair [] [] [] [] [] [] [] [] [] [x] []    Stand to Sit [] [] [] [] [] [] [] [] [] [x] []    Tub/Shower [] [] [] [] [] [] [] [] [] [x] []     Toilet [] [] [] [] [] [] [] [] [] [x] []      [] [] [] [] [] [] [] [] [] [] []    I=Independent, Mod I=Modified Independent, S=Supervision/Setup, SBA=Standby Assistance, CGA=Contact Guard Assistance, Min=Minimal Assistance, Mod=Moderate Assistance, Max=Maximal Assistance, Total=Total Assistance, NT=Not Tested    ACTIVITIES OF DAILY LIVING: I Mod I S SBA CGA Min Mod Max Total NT Comments   BASIC ADLs:              Upper Body   Bathing [] [] [] [] [] [] [] [] [] [x]    Lower Body Bathing [] [] [] [] [] [] [] [] [] [x]    Toileting [] [] [] [] [] [] [] [] [x] []    Upper Body Dressing [] [] [] [] [] [] [x] [] [] []    Lower Body Dressing [] [] [] [] [] [] [] [x] [] []    Feeding [] [] [] [] [] [] [] [] [] [x]    Grooming [] [] [] [] [] [] [] [] [] [x]    Personal Device Care [] [] [] [] [] [] [] [] [] [x]    Functional Mobility [] [] [] [] [] [] [] [] [] [x]    I=Independent, Mod I=Modified Independent, S=Supervision/Setup, SBA=Standby Assistance, CGA=Contact Guard Assistance, Min=Minimal Assistance, Mod=Moderate Assistance, Max=Maximal Assistance, Total=Total Assistance, NT=Not Tested    BALANCE: Good Fair+ Fair Fair- Poor NT Comments   Sitting Static [] [] [] [x] [] []    Sitting Dynamic [] [] [] [x] [] []              Standing Static [] [] [] [] [] [x]    Standing Dynamic [] [] [] [] [] [x]        PLAN:     FREQUENCY/DURATION   OT Plan of Care: 3 times/week for duration of hospital stay or until stated goals are met, whichever comes first.    TREATMENT:     TREATMENT:   Neuromuscular Re-education (25 Minutes): Neuromuscular Re-education included Balance Training, Coordination training, Postural training, and Sitting balance training to improve Balance, Coordination, and Postural Control. Self Care (14 minutes): Patient participated in toileting, upper body dressing, and lower body dressing ADLs in unsupported sitting and supine with maximal verbal, manual, and tactile cueing to increase independence and decrease assistance required. Patient also participated in functional transfer training to increase independence and decrease assistance required.      TREATMENT GRID:  N/A    AFTER TREATMENT PRECAUTIONS: Bed, Bed/Chair Locked, Call light within reach, and Needs within reach    INTERDISCIPLINARY COLLABORATION:  RN/ PCT, PT/ PTA, and OT/ JAUREGUI    EDUCATION:       TOTAL TREATMENT DURATION AND TIME:  Time In: 1307  Time Out: 1403 [facility]  Minutes: 120 [facility], [facility]

## 2022-10-11 NOTE — PROGRESS NOTES
ACUTE PHYSICAL THERAPY GOALS:   (Developed with and agreed upon by patient and/or caregiver.)     (1.) Danya Rivera  will move from supine to sit and sit to supine  with MINIMAL ASSIST within 7 treatment day(s). (2.) Danya Rivera will transfer from bed to chair and chair to bed with MODERATE ASSIST using the least restrictive device within 7 treatment day(s). (3.) Danya Rivera will perform sitting static and dynamic balance activities x 15 minutes with MINIMAL ASSIST to improve safety within 7 treatment day(s). (4.) Danya Rivera will perform bilateral lower extremity exercises x 15 min for HEP with SUPERVISION to improve strength, endurance, and functional mobility within 7 treatment day(s). PHYSICAL THERAPY: Daily Note PM   (Link to Caseload Tracking: PT Visit Days : 2  Time In/Out PT Charge Capture  Rehab Caseload Tracker  Orders    Danya Rivera is a 67 y.o. female   PRIMARY DIAGNOSIS: Acute sepsis ([address]  Hypernatremia [E87.0]  Atrial fibrillation with rapid ventricular response (HCC) [I48.91]  Acute kidney injury ([address] [N17.9]  Severe sepsis ([address] [A41.9, R65.20]  Urinary tract infection with hematuria, site unspecified [N39.0, R31.9]  Acute sepsis ([address] [A41.9]       Inpatient: Payor: MEDICARE / Plan: MEDICARE PART A AND B / Product Type: *No Product type* /     ASSESSMENT:     REHAB RECOMMENDATIONS:   Recommendation to date pending progress:  Setting:  Short-term Rehab    Equipment:    To Be Determined     ASSESSMENT:  Ms. Michelle Rivera  is a 67year old female who presents supine in bed upon PT entry. Pt continues to be limited by cognition by made some progress towards her sitting goals today, performing mobility including bed mobility with maxAx2 and sitting balance tasks with Kathy-CGA. Pt had large bowel movement requiring extensive perihygiene and linen change. Pt presents as functioning below her baseline, with deficits in mobility including transfers, gait, balance, and activity tolerance.  Pt will benefit from skilled therapy services to address stated deficits to promote return to highest level of function, independence, and safety. Will continue to follow.      SUBJECTIVE:   Ms. Elise Needs states, \"That feels good\"     Social/Functional Lives With: Alone  Type of Home: Assisted living  Home Layout: One level  Home Equipment: Gilaa Janneth, beatrice, [address] Help From: Family  ADL Assistance: Needs assistance  Ambulation Assistance: Needs assistance  Active : No  Occupation: Retired  OBJECTIVE:     PAIN: VITALS / O2: PRECAUTION / Larwance Webster / Nichole Hall:   Pre Treatment:   Pain Assessment: None - Denies Pain      Post Treatment: None Vitals        Oxygen  O2 Therapy: Room air IV, Purewick, and Telemetry     RESTRICTIONS/PRECAUTIONS:  Restrictions/Precautions  Restrictions/Precautions: Fall Risk  Restrictions/Precautions: Fall Risk     MOBILITY: I Mod I S SBA CGA Min Mod Max Total  NT x2 Comments:   Bed Mobility    Rolling [] [] [] [] [] [] [] [x] [] [] [x]    Supine to Sit [] [] [] [] [] [] [] [x] [] [] [x]    Scooting [] [] [] [] [] [] [] [x] [] [] [x]    Sit to Supine [] [] [] [] [] [] [] [x] [] [] [x]    Transfers    Sit to Stand [] [] [] [] [] [] [] [] [] [x] []    Bed to Chair [] [] [] [] [] [] [] [] [] [x] []    Stand to Sit [] [] [] [] [] [] [] [] [] [x] []     [] [] [] [] [] [] [] [] [] [x] []    I=Independent, Mod I=Modified Independent, S=Supervision, SBA=Standby Assistance, CGA=Contact Guard Assistance,   Min=Minimal Assistance, Mod=Moderate Assistance, Max=Maximal Assistance, Total=Total Assistance, NT=Not Tested    BALANCE: Good Fair+ Fair Fair- Poor NT Comments   Sitting Static [] [x] [] [] [] []    Sitting Dynamic [] [] [x] [] [] [] Sat EOB x10min for ADLs with CGA-Kathy, frequent verbal/tactile cues for anterior weightshifting             Standing Static [] [] [] [] [] [x]    Standing Dynamic [] [] [] [] [] [x]      GAIT: I Mod I S SBA CGA Min Mod Max Total  NT x2 Comments:   Level of Assistance [] [] [] [] [] [] [] [] [] [x] []    Distance   feet    DME N/A    Gait Quality N/A    Weightbearing Status      Stairs      I=Independent, Mod I=Modified Independent, S=Supervision, SBA=Standby Assistance, CGA=Contact Guard Assistance,   Min=Minimal Assistance, Mod=Moderate Assistance, Max=Maximal Assistance, Total=Total Assistance, NT=Not Tested    PLAN:   FREQUENCY AND DURATION: 3 times/week for duration of hospital stay or until stated goals are met, whichever comes first.    TREATMENT:   TREATMENT:   Co-Treatment PT/OT necessary due to patient's decreased overall endurance/tolerance levels, as well as need for high level skilled assistance to complete functional transfers/mobility and functional tasks  Therapeutic Activity (44 Minutes): Therapeutic activity included Rolling, Supine to Sit, Sit to Supine, Scooting, Lateral Scooting, and Sitting balance  to improve functional Activity tolerance, Balance, Coordination, Mobility, and Strength.     TREATMENT GRID:  N/A    AFTER TREATMENT PRECAUTIONS: Bed, Bed/Chair Locked, Call light within reach, Needs within reach, and RN notified    INTERDISCIPLINARY COLLABORATION:  RN/ PCT and OT/ JAUREGUI    EDUCATION:      TIME IN/OUT:  Time In: 1302  Time Out: 1403 [facility]  Minutes: 1002 [facility], PT

## 2022-10-11 NOTE — PROGRESS NOTES
Pt's sister/POA, Travis Edwards, called to review MRI screening questions by self with Jeremiah Maurer RN present. Per sister, MRI screening questions answered to the best of her knowledge. Pt's sister also informed of pt being withdrawn/slightly uncooperative. Per sister, pt's behavior is normal as pt was withdrawn earlier in the day.

## 2022-10-11 NOTE — CARE COORDINATION
Pt presented to the ED for fevers and AFib with RVR. PMHx of A. Fib, former colon CA, dementia, and DM2. Pt lives at an [facility] [address], [address] [facility]. Pt is fairly indep with her ADLs. On RA. She is wheel chair bound and has a RW; supposed to be getting a hospital bed at the [facility]. PT/OT consulted and recommended STR. Pt declined and stated that she wanted to go home. Pt has been to [facility] in the past. CM then spoke to her HCPOA, her sister, Corry Diaz ([phone]) for additional information, pt presented with a flat affect and delayed response. Corry Diaz stated that the pt receives PT/OT from [facility] and that the pt was doing fairly well with them; work in-house with the residents at the [facility]. CM spoke with April in Admissions at the [address] [place] [address] and confirmed that the pt could return w/o a COVID swab or PPD. She requested PT/OT notes and order be faxed to her attention-done. Pt will require ambo transport at discharge, will arrange. PCP confirmed. Insurance verified. Able to afford her meds. Will continue to monitor. 10/11/22 1423   Service Assessment   Patient Orientation Unable to Assess   Cognition Other (see comment)  (Delayed response)   History Provided By Child/Family   Primary Caregiver Self   Support Systems Family Members;[facility]   PCP Verified by CM Yes  Avel Gibson)   Prior Functional Level Assistance with the following:   Current Functional Level Assistance with the following:   Can patient return to prior living arrangement Yes   Ability to make needs known: Fair   Family able to assist with home care needs: Yes   Would you like for me to discuss the discharge plan with any other family members/significant others, and if so, who?  No   Financial Resources [facility]  ([address] [facility])   Social/Functional History   Lives With Alone   Type of Home Assisted living   Home Layout One level   [address] [place] [address],[address], rolling; [address] Help From Family   ADL Assistance Needs assistance   Ambulation Assistance Needs assistance   Active  No   Occupation Retired   Discharge Planning   Type of [address] [place] [address] Prior To Admission None   Potential Assistance Needed N/A   DME Ordered? No   Potential Assistance Purchasing Medications No   Type of Home Care Services PT;OT   Patient expects to be discharged to: Assisted living   One/Two Story Residence One story   Services At/After Discharge   Transition of Care Consult (CM Consult) Assisted Living;Discharge [facility] Discharge Assisted living;PT;OT   [employer] Information Provided?  No   Mode of Transport at Discharge BLS   Confirm Follow Up Transport Family

## 2022-10-11 NOTE — PROGRESS NOTES
Claritza Martinez  Admission Date: 10/7/2022         [facility] Progress Note: 10/11/2022    Follow-up for: LACY    The patient's chart is reviewed and the patient is discussed with the staff.     Subjective:     Not talking much secondary to her dementia  good urine output seen    ROS:  Gen - no fever, no chills, appetite unchanged  CV - no chest pain, no palpitation  Lung - no shortness of breath, no cough  Abd - no tenderness, no nausea/vomiting, no diarrhea  Ext - no edema    Current Facility-Administered Medications   Medication Dose Route Frequency    tamsulosin (FLOMAX) capsule 0.4 mg  0.4 mg Oral Daily    dextrose 5 % and 0.45 % sodium chloride infusion   IntraVENous Continuous    vancomycin (VANCOCIN) 1250 mg in sodium chloride 0.9% 250 mL IVPB  1,250 mg IntraVENous Q24H    apixaban (ELIQUIS) tablet 5 mg  5 mg Oral BID    cefTRIAXone (ROCEPHIN) 2,000 mg in sodium chloride 0.9 % 50 mL IVPB mini-bag  2,000 mg IntraVENous Q24H    nystatin (MYCOSTATIN) 447909 UNIT/ML suspension 500,000 Units  5 mL Oral 4x Daily    metoprolol succinate (TOPROL XL) extended release tablet 50 mg  50 mg Oral Daily    levothyroxine (SYNTHROID) tablet 75 mcg  75 mcg Oral Daily    memantine (NAMENDA) tablet 10 mg  10 mg Oral BID    miconazole (MICOTIN) 2 % powder   Topical BID    glucose chewable tablet 16 g  4 tablet Oral PRN    dextrose bolus 10% 125 mL  125 mL IntraVENous PRN    Or    dextrose bolus 10% 250 mL  250 mL IntraVENous PRN    glucagon (rDNA) injection 1 mg  1 mg SubCUTAneous PRN    dextrose 10 % infusion   IntraVENous Continuous PRN    sodium chloride flush 0.9 % injection 5-40 mL  5-40 mL IntraVENous 2 times per day    sodium chloride flush 0.9 % injection 5-40 mL  5-40 mL IntraVENous PRN    0.9 % sodium chloride infusion   IntraVENous PRN    polyethylene glycol (GLYCOLAX) packet 17 g  17 g Oral Daily PRN    acetaminophen (TYLENOL) tablet 650 mg  650 mg Oral Q6H PRN    Or    acetaminophen (TYLENOL) suppository 650 mg  650 mg Rectal Q6H PRN         Objective:     Vitals:    10/10/22 2348 10/11/22 0400 10/11/22 0843 10/11/22 1130   BP: 105/66 103/77  110/76   Pulse: 79 89 77 80   Resp: 16 16  16   Temp: 97.4 °F (36.3 °C) 97.4 °F (36.3 °C)  97.4 °F (36.3 °C)   TempSrc: Oral Oral  Oral   SpO2: 98% 98%  98%   Weight:  237 lb 14.4 oz (107.9 kg)     Height:         Intake and Output:   10/09 1901 - 10/11 0700  In: 150 [P.O.:150]  Out: 1425 [Urine:1425]  No intake/output data recorded. Physical Exam:   Constitutional:  the patient is well developed and in no acute distress  HEENT:  Sclera clear, pupils equal, oral mucosa moist  Lungs: Clear  Cardiovascular:  RRR without M,G,R  Abd/GI: soft and non-tender; with positive bowel sounds. Ext: warm without cyanosis. There is no lower leg edema. US RETROPERITONEAL COMPLETE   Final Result   1. No evidence of hydronephrosis. 2. 6.7 cm right kidney simple cyst.   3. Normal left kidney. 4. Known pelvic mass. XR CHEST PORTABLE   Final Result   The lungs are clear. The heart is normal in size. No pneumothorax. No pleural effusions. MRI ABDOMEN W WO CONTRAST    (Results Pending)   MRI BRAIN WO CONTRAST    (Results Pending)         LAB  Recent Labs     10/09/22  0144 10/10/22  0602 10/11/22  0611   WBC 20.4* 13.8* 11.0   HGB 14.3 14.1 13.7   HCT 46.2 45.4 43.7   * 127* 143*       Recent Labs     10/10/22  0602 10/10/22  0953 10/11/22  0611    144 146*   K 3.6 4.3 4.3   * 120* 118*   CO2 20* 16* 23   BUN 40* 38* 32*   CREATININE 1.30* 1.20* 1.00   PHOS  --   --  2.4         No results for input(s): PH, PCO2, PO2, HCO3 in the last 72 hours. Plan:  (Medical Decision Making)     1. LACY  Your volume contraction seen with hyperosmolarity serum osmolality 344  Improving with hydration           2. Hypernatremia  Corrected         3. Hypokalemia  Corrected         . 4. UTI  F/u  vancomycin level       5.   A. fib RVR

## 2022-10-11 NOTE — PROGRESS NOTES
Progress Note    Patient: Gigi Carty MRN: [id number]  SSN: [id number]    YOB: 1950  Age: 67 y.o. Sex: female      Admit Date: 10/7/2022    LOS: 4 days     Assessment and Plan:   57-year-old female with a past medical history of atrial fibrillation not on anticoagulation, history of colon cancer, dementia, diabetes, that presented in the setting of fevers    1. Sepsis concerning urinary tract infection and Staph bacteremia unclear source  Afebrile, no leukocytosis   urine culture negative   -Continue ceftriaxone and vancomycin for now  epeat blood cultures negative  -Echocardiogram without signs of IE  ID was following, appreciate recommendations    Hypoglycemic  Blood glucose is ranging around 70 to 90s  Started on D5 plus half NS  Nutritionist was consulted    Acute urinary retention  Failed trial of void yesterday  Retained 500 mL of urine and straight cath was done removing 300 mlof urine  Ultrasound showed no hydronephrosis but known pelvic mass  Flomax was added  Follow-up with MRI    Acute hypoxic respiratory failure  Patient is saturating well on RA  RT assessed and patient is on room air, no oxygen needs    2. Atrial fibrillation with RVR  No need of MYLA at this time  S/p Cardizem drip and heparin drip  -Telemetry monitoring  -Continue metoprolol  Continue Eliquis  Cardiology signed off    3. Mild hypernatremia  Sodium is 146  -Nephrology recommendations appreciated    LACY   Creatinine is back to  baseline  Cr improving with IVF   Ultrasound kidney shows simple cyst  Nephrology following, appreciate recommendations    4. Hypokalemia  Resolved     5. Thrush  -Continue nystatin    6.   Diabetes mellitus  Hba1c 6.3  -Insulin sliding scale  -Blood sugar checks before meals and at bedtime    Thrombocytopenia  Platelet count is improving 143k    DVT prophylaxis Eliquis    Dispo anticipate hospital stay for 1 to 2 days and then STR      Subjective:   57-year-old female with a past medical history of atrial fibrillation not on anticoagulation, history of colon cancer, dementia, diabetes, that presented in the setting of fevers. Patient is seen and examined at bedside. ROS is limited due to altered mental status. Patient knows her name and place but not oriented to the time. She was having memory issues. Poorly attentive    Objective:     Vitals:    10/10/22 2348 10/11/22 0400 10/11/22 0843 10/11/22 1130   BP: 105/66 103/77  110/76   Pulse: 79 89 77 80   Resp: 16 16  16   Temp: 97.4 °F (36.3 °C) 97.4 °F (36.3 °C)  97.4 °F (36.3 °C)   TempSrc: Oral Oral  Oral   SpO2: 98% 98%  98%   Weight:  237 lb 14.4 oz (107.9 kg)     Height:            Intake and Output:  Current Shift: No intake/output data recorded.   Last three shifts: 10/09 1901 - 10/11 0700  In: 150 [P.O.:150]  Out: 1425 [Urine:1425]    ROS  10 ROS negative except from stated on subjective    Physical Exam:   General: Alert, oriented, NAD  HEENT: NC/AT, EOM are intact  Neck: supple, no JVD  Cardiovascular: RRR, S1, S2, no murmurs  Respiratory: Lungs are clear, no wheezes or rales  Abdomen: Soft, NT, ND  Back: No CVA tenderness, no paraspinal tenderness  Extremities: LE without pedal edema, no erythema  Neuro: A&O, CN are intact, no focal deficits  Skin: no rash or ulcers  Psych: good mood and affect    Lab/Data Review:  I have personally reviewed patients laboratory data showing  Recent Results (from the past 24 hour(s))   POCT Glucose    Collection Time: 10/10/22  4:56 PM   Result Value Ref Range    POC Glucose 97 65 - 100 mg/dL    Performed by: Chiquita Christian    POCT Glucose    Collection Time: 10/10/22  9:01 PM   Result Value Ref Range    POC Glucose 74 65 - 100 mg/dL    Performed by: Katia    POCT Glucose    Collection Time: 10/11/22 12:28 AM   Result Value Ref Range    POC Glucose 71 65 - 100 mg/dL    Performed by: Bob    CBC with Auto Differential    Collection Time: 10/11/22  6:11 AM   Result Value Ref Range    WBC 11.0 4.3 - 11.1 K/uL    RBC 4.73 4.05 - 5.2 M/uL    Hemoglobin 13.7 11.7 - 15.4 g/dL    Hematocrit 43.7 35.8 - 46.3 %    MCV 92.4 82 - 102 FL    MCH 29.0 26.1 - 32.9 PG    MCHC 31.4 31.4 - 35.0 g/dL    RDW 17.2 (H) 11.9 - 14.6 %    Platelets 180 (L) 151 - 450 K/uL    MPV 11.8 9.4 - 12.3 FL    nRBC 0.03 0.0 - 0.2 K/uL    Differential Type AUTOMATED      Seg Neutrophils 73 43 - 78 %    Lymphocytes 18 13 - 44 %    Monocytes 6 4.0 - 12.0 %    Eosinophils % 2 0.5 - 7.8 %    Basophils 0 0.0 - 2.0 %    Immature Granulocytes 1 0.0 - 5.0 %    Segs Absolute 8.1 1.7 - 8.2 K/UL    Absolute Lymph # 2.0 0.5 - 4.6 K/UL    Absolute Mono # 0.6 0.1 - 1.3 K/UL    Absolute Eos # 0.2 0.0 - 0.8 K/UL    Basophils Absolute 0.0 0.0 - 0.2 K/UL    Absolute Immature Granulocyte 0.1 0.0 - 0.5 K/UL   Comprehensive Metabolic Panel w/ Reflex to MG    Collection Time: 10/11/22  6:11 AM   Result Value Ref Range    Sodium 146 (H) 133 - 143 mmol/L    Potassium 4.3 3.5 - 5.1 mmol/L    Chloride 118 (H) 101 - 110 mmol/L    CO2 23 21 - 32 mmol/L    Anion Gap 5 2 - 11 mmol/L    Glucose 70 65 - 100 mg/dL    BUN 32 (H) 8 - 23 MG/DL    Creatinine 1.00 0.6 - 1.0 MG/DL    Est, Glom Filt Rate 59 (L) >60 ml/min/1.73m2    Calcium 8.5 8.3 - 10.4 MG/DL    Total Bilirubin 0.7 0.2 - 1.1 MG/DL    ALT 75 (H) 12 - 65 U/L    AST 51 (H) 15 - 37 U/L    Alk Phosphatase 90 50 - 136 U/L    Total Protein 5.5 (L) 6.3 - 8.2 g/dL    Albumin 2.8 (L) 3.2 - 4.6 g/dL    Globulin 2.7 (L) 2.8 - 4.5 g/dL    Albumin/Globulin Ratio 1.0 0.4 - 1.6     Phosphorus    Collection Time: 10/11/22  6:11 AM   Result Value Ref Range    Phosphorus 2.4 2.3 - 3.7 MG/DL   Vancomycin Level, Random    Collection Time: 10/11/22  6:11 AM   Result Value Ref Range    Vancomycin Rm 22.5 UG/ML      [unfilled]     Image:  I have personally reviewed patients imaging showing  US RETROPERITONEAL COMPLETE   Final Result   1. No evidence of hydronephrosis.    2. 6.7 cm right kidney simple cyst.   3. Normal left kidney. 4. Known pelvic mass. XR CHEST PORTABLE   Final Result   The lungs are clear. The heart is normal in size. No pneumothorax. No pleural effusions.          MRI ABDOMEN W WO CONTRAST    (Results Pending)   MRI BRAIN WO CONTRAST    (Results Pending)        Current Facility-Administered Medications   Medication Dose Route Frequency Provider Last Rate Last Admin    tamsulosin (FLOMAX) capsule 0.4 mg  0.4 mg Oral Daily Cherlyn Litten, MD   0.4 mg at 10/11/22 0836    dextrose 5 % and 0.45 % sodium chloride infusion   IntraVENous Continuous Cherlyn Litten, MD 75 mL/hr at 10/11/22 1055 New Bag at 10/11/22 1055    vancomycin (VANCOCIN) 1250 mg in sodium chloride 0.9% 250 mL IVPB  1,250 mg IntraVENous Q24H Cherlyn Litten, MD        apixaban (ELIQUIS) tablet 5 mg  5 mg Oral BID Kartik Colbert MD   5 mg at 10/11/22 0836    cefTRIAXone (ROCEPHIN) 2,000 mg in sodium chloride 0.9 % 50 mL IVPB mini-bag  2,000 mg IntraVENous Q24H James Conklin MD   Stopped at 10/11/22 1200    nystatin (MYCOSTATIN) 899557 UNIT/ML suspension 500,000 Units  5 mL Oral 4x Daily James Conklin MD   500,000 Units at 10/10/22 2149    metoprolol succinate (TOPROL XL) extended release tablet 50 mg  50 mg Oral Daily Kartik Colbert MD   50 mg at 10/11/22 0836    levothyroxine (SYNTHROID) tablet 75 mcg  75 mcg Oral Daily Evelena Grumbles, DO   75 mcg at 10/11/22 0615    memantine (NAMENDA) tablet 10 mg  10 mg Oral BID Evelena Grumbles, DO   10 mg at 10/11/22 0836    miconazole (MICOTIN) 2 % powder   Topical BID Evelena Grumbles, DO   Given at 10/11/22 0144    glucose chewable tablet 16 g  4 tablet Oral PRN Evelena Grumbles, DO        dextrose bolus 10% 125 mL  125 mL IntraVENous PRN Evelena Grumbles, DO        Or    dextrose bolus 10% 250 mL  250 mL IntraVENous PRN Evelena Grumbles, DO        glucagon (rDNA) injection 1 mg  1 mg SubCUTAneous PRN Evelena Grumbles, DO        dextrose 10 % infusion   IntraVENous Continuous PRN Erla Gables, DO        sodium chloride flush 0.9 % injection 5-40 mL  5-40 mL IntraVENous 2 times per day Erla Gables, DO   10 mL at 10/11/22 0841    sodium chloride flush 0.9 % injection 5-40 mL  5-40 mL IntraVENous PRN Erla Gables, DO        0.9 % sodium chloride infusion   IntraVENous PRN Erla Gables, DO        polyethylene glycol (GLYCOLAX) packet 17 g  17 g Oral Daily PRN Erla Gables, DO        acetaminophen (TYLENOL) tablet 650 mg  650 mg Oral Q6H PRN Erla Gables, DO        Or    acetaminophen (TYLENOL) suppository 650 mg  650 mg Rectal Q6H PRN Erla Gables, DO            Hospital problems     Patient Active Problem List   Diagnosis    Dementia without behavioral disturbance ([address]    Type 2 diabetes with nephropathy ([address]    Uterine mass    Atrial fibrillation with RVR (HCC)    Chronic anticoagulation    Acquired hypothyroidism    Hypercholesterolemia    Severe obesity (BMI 35.0-39. 9) with comorbidity ([address]    Inability to walk    Acute pain of left knee    Fall from ground level    Acute sepsis ([address]    LACY (acute kidney injury) ([address]    Metabolic acidosis    Transaminitis    Hypernatremia        I have reviewed, updated, and verified this note's content and spent 36 minutes of my 40 minutes visit performing counseling and coordination of care regarding medical management.        Signed By: Lakhwinder Balderas MD     October 11, 2022

## 2022-10-11 NOTE — CONSULTS
Palliative Care    Patient: Navi Harp MRN: [id number]  SSN: [id number]    YOB: 1950  Age: 67 y.o. Sex: female       Date of Request: 10/11/2022  Date of Consult:  10/11/2022  Reason for Consult:  goals of care  Requesting Physician: Dr Michelle Davis      Assessment/Plan:     Principal Diagnosis:    Debility, Unspecified  R53.81    Additional Diagnoses:   Dementia  F03.90   Frailty  R54  Sepsis   Counseling, Encounter for Medical Advice  Z71.9  Encounter for Palliative Care  Z51.5    Palliative Performance Scale (PPS):       Medical Decision Making:   Reviewed and summarized notes from admission to present   Discussed case with appropriate providers  Reviewed laboratory and x-ray data from admission to present     Pt resting in bed, appears comfortable. No family at bedside. Pt is oriented to self and states she is in the hospital, but cannot give any further details. Pt denied pain or other symptoms. I spoke with pt's sister, Marybel Lundberg, via phone. Introduced role of PC and reviewed events. Marybel Lundberg has good understanding of pt's condition, and pending tests. Her goal is to identify the source of infection, and treat it appropriately. She would like for pt to return to her detention facility at discharge. She is open to pt having PT/OT at discharge, but states pt is likely to refuse because she doesn't understand. We discussed pt's wishes regarding life support and resuscitation. Pt's AD on file states she would not want resuscitation or life support if she has a terminal condition or is in an unconscious state. Marybel Lundberg requests full code status since pt does not meet either of these criteria at present. Pt does have an MRI of the abdomen and brain pending, but she is not likely to hold still for these. We will follow loosely.       Will discuss findings with members of the interdisciplinary team.      Thank you for this referral.          .    Subjective:     History obtained from:  Patient, Family, and Chart    Chief Complaint: UTI  History of Present Illness:  Ms Lisa Bourgeois is a 68 yo female with PMH of atrial fibrillation, colon cancer, dementia, DM, and other conditions listed below, who presented to the ER from her long-term on 10/7/2022 with c/o dyspnea, elevated HR, and fever for a few hours. EMS noted pt was in atrial fibrillation with RVR, and pt ws given a bolus of amiodarone en route to the hospital.  Work up revealed fever, tachycardia, hypernatremia, LACY, elevated LA, and leukocytosis. Blood an urine cultures were obtained, and pt was admitted for further management. Blood cultures were positive for Staph. Pt was evaluated by ID, nephrology, and Cardiology. MRI of the abdomen and brain are pending. Pt denies complaints at this time. Advance Directive: Yes       Code Status:  Full Code            Health Care Power of : Yes - Copy of [address] on file. Past Medical History:   Diagnosis Date    Atrial fibrillation ([address]     Colon cancer ([address] 1999    Dementia ([address]     with memory loss    Diabetes ([address]     Menopause     Thyroid disease     Uterine mass       Past Surgical History:   Procedure Laterality Date    TOTAL COLECTOMY  1999     Family History   Problem Relation Age of Onset    Diabetes Mother     Diabetes Father       Social History     Tobacco Use    Smoking status: Never    Smokeless tobacco: Never   Substance Use Topics    Alcohol use: No     Prior to Admission medications    Medication Sig Start Date End Date Taking?  Authorizing Provider   FREESTYLE LITE strip USE TO CHECK BLOOD GLUCOSE ONCE DAILY 6/17/22   VIJAYA Sheehan CNP   FreeStyle Lancets MISC USE TO CHECK BLOOD GLUCOSE ONCE DAILY 6/17/22   VIJAYA Sheehan CNP   acetaminophen (TYLENOL) 325 MG tablet Take 650 mg by mouth every 4 hours as needed 6/4/21   Ar Automatic Reconciliation   ascorbic acid (VITAMIN C) 250 MG tablet Take 250 mg by mouth daily 6/4/21   Ar Automatic Reconciliation atorvastatin (LIPITOR) 40 MG tablet TAKE 1 TABLET DAILY 3/22/22   Ar Automatic Reconciliation   glyBURIDE-metFORMIN (GLUCOVANCE) 2.5-500 MG per tablet TAKE 2 TABLETS TWICE A DAY WITH MEALS 3/22/22   Ar Automatic Reconciliation   levothyroxine (SYNTHROID) 75 MCG tablet TAKE 1 TABLET DAILY BEFORE BREAKFAST 2/24/22   Ar Automatic Reconciliation   memantine (NAMENDA) 10 MG tablet TAKE 1 TABLET TWICE A DAY 3/22/22   Ar Automatic Reconciliation   metoprolol succinate (TOPROL XL) 25 MG extended release tablet Take 25 mg by mouth daily 3/22/22   Ar Automatic Reconciliation   nystatin (MYCOSTATIN) 241715 UNIT/GM powder Apply powder to skin folds between breasts and abdomen 4 times daily 3/22/22   Ar Automatic Reconciliation   SITagliptin (JANUVIA) 100 MG tablet Take 100 mg by mouth daily 9/15/21   Ar Automatic Reconciliation       No Known Allergies     Review of Systems:  A comprehensive review of systems was negative except for: Constitutional: Positive for fatigue. Objective:     Visit Vitals  /77   Pulse 77   Temp 97.4 °F (36.3 °C) (Oral)   Resp 16   Ht 5' 7\" (1.702 m)   Wt 237 lb 14.4 oz (107.9 kg)   SpO2 98%   BMI 37.26 kg/m²        Physical Exam:    General:  Cooperative. No acute distress. Eyes:  Conjunctivae/corneas clear    Nose: Nares normal. Septum midline.    Neck: Supple, symmetrical, trachea midline   Lungs:   Clear to auscultation bilaterally, unlabored   Heart:  Regular rate and rhythm   Abdomen:   Soft, non-tender, non-distended   Extremities: Normal, atraumatic, no cyanosis or edema   Skin: Skin color, texture, turgor normal   Neurologic: Nonfocal   Psych: Alert and oriented person and place      Assessment:     [unfilled]    Signed By: Jameel Lala, APRN - CNP     October 11, 2022

## 2022-10-11 NOTE — PROGRESS NOTES
Pt's blood glucose resulted as 74. Pt given apple juice but pt only drank 50mL and refusing to drink more. Pt informed of purpose/need to consume juice, but pt still refused. When asked, pt stated orange juice is her favorite. Orange juice gotten for pt, but pt still refusing to drink any. Will recheck blood glucose around midnight. 0020 - pt's blood glucose recheck resulted at 71. Convinced pt to drink roughly 100mLs of orange juice.

## 2022-10-11 NOTE — BH NOTE
PSYCHIATRIC EVALUATION    Date of Service: 10/11/2022    Purpose:  Psychiatric Diagnostic Evaluation  Referral Source: Antoni Shay MD  History  From: patient and patient chart      Chief Complaint:  Evaluation / hx of dementia    History of Present Illness:  Bryan Reza is a 67 y.o. female with a history significant for dementia  . Pt has a PMH that includes:  A. Fib, Colon CA, Diabetes, Thyroid disease, HTN, HLD. Pt  presented to the ED on 10-7-2022, c/o:  Triage note - Patient arrives to ED via EMS from [facility]. Facility called out related to SOB. When EMS arrived patient was diaphoretic with a HR of 250-290. In route:  20 mg Cardizem  400 cc fluids  VS:  Temp: 102  BP: 110/60  O2:97% RA  RR 40    MD note - Patient presented with tachycardia and fever. Patient was found to be in atrial fibrillation with rapid ventricular response. However, I believe this was secondary to the patient's sepsis which is likely due to the urine as the source. Patient was hypernatremic, and had acute kidney injury. I treated the tachycardia with a combination of IV fluid and diltiazem. However, I felt the biggest concern was treating the underlying condition which was the sepsis. Patient did improve with IV fluids and IV antibiotics. Patient's heart rate was in the 140s at the time of admission. Patient's heart rate had been in the 200s prior to arrival here. Patient's blood pressure remained stable and she never needed pressors. Patient will be admitted to the hospitalist to telemetry. Hospitalist note - Patient is a 67 y.o. female who presented to the ED for cc fevers and afib with RVR. HR noted to be in the 150s-180s on arrival. Given diltiazem bolus. Hx of a fib not on anticoagulation, former colon cancer, dementia, DM type II, and wheel chair found. Currently living at [facility] assisted [facility]. Temp 102. 6. . Na 155, CO2 15, creatine 1.9 from baseline 0.8, procal 0.6. Lactic acid 4.2. LFTs elevated. WBC 17.8. Severe sepsis met by temp, HR, WBC, LACY, elevated LFTs - Vanc/Ceftriaxone. Urine culture ordered, in the past has grown Klebsiella sensitive to Ceftriaxone along with e coli, enterococcus faecalis, and staph epidermidis. Blood cultures ordered. A fib RVR- cardizem drip. Tx sepsis. Unsure how long has had tachycardia. CHADSVASC 2 score is 5 so will start heparin drip and consult cardiology to ensure no need for MYLA  Metabolic acidosis - Tx sepsis  Hypernatremia - D5, give bolus due to how dehydrated she is. Do not correct more than 6-8 units in 24 hours to avoid cerebral edema. LACY - Tx sepsis. IV fluids. Transaminitis - Tx sepsis  HLD - holding statin  Hypothyroidism - Synthroid  Dementia - Namenda  Dm type II - SS  HTN- BB  Nystatin  Place on telemetry floor. 10-8-2022 - Cardiology note - Patient : No acute events overnight. Patient sitting up in bed this AM. Patient admitted for urosepsis and elevated rate with PAF. 10-9-2022 - Hospitalist note -  69-year-old female with a past medical history of atrial fibrillation not on anticoagulation, history of colon cancer, dementia, diabetes, that presented in the setting of fevers. Patient seen and examined at bedside. This morning the patient feeling little weak, denies any chest pain, no abdominal pain, no nausea or vomiting. 10- - Hospitalist note - 69-year-old female with a past medical history of atrial fibrillation not on anticoagulation, history of colon cancer, dementia, diabetes, that presented in the setting of fevers. Patient seen and examined at bedside. This morning the patient is feeling much better. Denies any chest pain, no abdominal pain, no nausea or vomiting.   Nephrology note - Not talking much secondary to her dementia  Sister present bedside  Rob catheter with tea colored good urine output seen  RN note - Pt's sister/POA, Noel Cook, called to review MRI screening questions by self with Duy Barone RN present. Per sister, MRI screening questions answered to the best of her knowledge. Pt's sister also informed of pt being withdrawn/slightly uncooperative. Per sister, pt's behavior is normal as pt was withdrawn earlier in the day. 10- - RN note - Pt's blood glucose resulted as 74. Pt given apple juice but pt only drank 50mL and refusing to drink more. Pt informed of purpose/need to consume juice, but pt still refused. When asked, pt stated orange juice is her favorite. Orange juice gotten for pt, but pt still refusing to drink any. Will recheck blood glucose around midnight. 0020 - pt's blood glucose recheck resulted at 71. Convinced pt to drink roughly 100mLs of orange juice. 10- - Palliative Care note - Pt resting in bed, appears comfortable. No family at bedside. Pt is oriented to self and states she is in the hospital, but cannot give any further details. Pt denied pain or other symptoms. I spoke with pt's sister, Hernesto Reyes, via phone. Introduced role of PC and reviewed events. Hernesto Reyes has good understanding of pt's condition, and pending tests. Her goal is to identify the source of infection, and treat it appropriately. She would like for pt to return to her residential facility at discharge. She is open to pt having PT/OT at discharge, but states pt is likely to refuse because she doesn't understand. We discussed pt's wishes regarding life support and resuscitation. Pt's AD on file states she would not want resuscitation or life support if she has a terminal condition or is in an unconscious state. Hernesto Reyes requests full code status since pt does not meet either of these criteria at present. Pt does have an MRI of the abdomen and brain pending, but she is not likely to hold still for these. We will follow loosely. ---------------------------------------------------------------------------------------------------------------------------------------------------------------------------------------------------------------------------------------------------------------------------------------------------------------------------------------------------------------------------------------------    Chart Review:    9- - OV - from [place] recently moved to [place] in assisted living. lived alone was hospitalized for dehydration, uncontrolled diabetes / sister has noticed memory problems worsening in the last few years. hygene problems, wears depends, does not change regularly   9- - Telephone -   memantine (NAMENDA) 10 mg tablet   Take 1 Tab by mouth two (2) times a day. 180 Tab   3     7- - OV - Med:  Namenda 10 mg po bid  10- - Telephone - Called and spoke with Vipul Hedge. Informed of Dr. Leah Jean-Baptiste response. Vipul Rosales voiced understanding. Prudence  to speak with Dr. Obey Palacios, pt's pcp and see if there is a doctor he would recommend for a second opinion on pt's dementia. Vipul Rosales voiced understanding./wc  I am not qualified to treat or diagnose dementia, needs to get PCP to write letter. Maliha Senior MD  10- [place] - Dx: Dementia without behavioral disturbance, unspecified dementia type ([address]  6-4-2021 - OV - states that her memory has worsened within the last few months and they dont feel like the medication is working. Dementia: Continue with current regimen.    ---------------------------------------------------------------------------------------------------------------------------------------------------------------------------------------------------------------------------------------------------------------------------------------------------------------------------------------------------------------------------------------------    10- - To room to meet with pt.   No family or friends at bedside. Pt lying in bed / calm, cooperative, good eye contact / pt mildly hesitant and guarded during evaluation. Pt alert and oriented to name, age, , year, place (\"hospital\") / disorientation to month and situation. Pt with noted hx of being dx with dementia - currently on Namenda. Pt tells me she was born in [place] (along with 1 brother). She states her parents were [place] - and were put in a \"camp\" in [facility] in [facility]. After the war, moved to [address] - states her 2 sisters and other brother born in the [address]. She states she attended college for psychology / then joined [employer] - initially for 3 yrs - but states \"I was having so much fun I decided to stay for 20 more years\" (retired). She is unable to tell me what she did in the [address] [place] [address] states she was stationed [employer] including in [facility]. She states she was  once to a gentleman that was also in the [facility] - this was his 2nd marriage - he had two daughters with previous marriage / they had no children together. She states  passed some years ago - cannot elaborate - and her sister didn't feel she could live on her own so she moved her here to be close to her. When asked pt why she came to the hospital, she initially starts telling me that she lived in [place], was  and then her  \". \"  Pt then tells me she came to the hospital r/t an injury from a fall / she cannot tell me what is being done for her in the hospital - treatments, disposition plans. Pt denies any MH hx / no psychiatric admissions, outpt psychiatric provider, no drug or tobacco use, hx of occasional alcohol use, no head trauma or seizures. When asked pt about her medical hx, including dementia - pt states she doesn't know. Pt states \"that's interesting\" when discussing dementia dx. Pt unable to give name of PCP or other providers.   Pt states she lives \"here\" when asked about current residence outside of hospital.  Pt unable to tell me about POA (noting per CM - pts sister Dede Malave is POA). Pt guarded and asks me Martin Mujica are you asking me all of these questions\" / explained reason for visit / hx of dementia - pt states \"ok\" and then tells me \"I have a terrific brother Rina No" then states \"I also have a brother Ra Vieira and sisters Ashly Barbosa and Dede Mlaave. \"  Pt tells me her sister Dede Malave is biggest support. Pt denies hx of SI/HI/AVH - she denies any current or active SI/HI/AVH and does not appear to be responding to any internal stimuli at this time.       Psychiatric Review Of Systems:  Sleep: stable  Appetite: stable  Current suicidal/homicidal ideations: Denies any current or active SI/HI - no intent or plan  Current auditory/visual hallucinations: Denies any AVH - pt does not appear to be responding to any internal stimuli at this time    Medications:    Current Facility-Administered Medications:     tamsulosin (FLOMAX) capsule 0.4 mg, 0.4 mg, Oral, Daily, Lakhwinder Balderas MD, 0.4 mg at 10/11/22 0836    dextrose 5 % and 0.45 % sodium chloride infusion, , IntraVENous, Continuous, Lakhwinder Balderas MD, Last Rate: 75 mL/hr at 10/11/22 1055, New Bag at 10/11/22 1055    vancomycin (VANCOCIN) 1250 mg in sodium chloride 0.9% 250 mL IVPB, 1,250 mg, IntraVENous, Q24H, Lakhwinder Balderas MD    apixaban (ELIQUIS) tablet 5 mg, 5 mg, Oral, BID, Martell Duane, MD, 5 mg at 10/11/22 0836    cefTRIAXone (ROCEPHIN) 2,000 mg in sodium chloride 0.9 % 50 mL IVPB mini-bag, 2,000 mg, IntraVENous, Q24H, Erny Driver MD, Last Rate: 100 mL/hr at 10/11/22 1130, 2,000 mg at 10/11/22 1130    nystatin (MYCOSTATIN) [id number] UNIT/ML suspension 500,000 Units, 5 mL, Oral, 4x Daily, Eryn Driver MD, 500,000 Units at 10/10/22 2149    metoprolol succinate (TOPROL XL) extended release tablet 50 mg, 50 mg, Oral, Daily, Martell Duane, MD, 50 mg at 10/11/22 0836    levothyroxine (SYNTHROID) tablet 75 mcg, 75 mcg, Oral, Daily, Erica Miranda DO, 75 mcg at 10/11/22 0615    memantine [facility]) tablet 10 mg, 10 mg, Oral, BID, Luis Karyn, DO, 10 mg at 10/11/22 0836    miconazole (MICOTIN) 2 % powder, , Topical, BID, Luis Karyn, DO, Given at 10/11/22 0144    glucose chewable tablet 16 g, 4 tablet, Oral, PRN, Luis Karyn, DO    dextrose bolus 10% 125 mL, 125 mL, IntraVENous, PRN **OR** dextrose bolus 10% 250 mL, 250 mL, IntraVENous, PRN, Luis Karyn, DO    glucagon (rDNA) injection 1 mg, 1 mg, SubCUTAneous, PRN, Luis Karyn, DO    dextrose 10 % infusion, , IntraVENous, Continuous PRN, Luis Karyn, DO    sodium chloride flush 0.9 % injection 5-40 mL, 5-40 mL, IntraVENous, 2 times per day, Luis Karyn, DO, 10 mL at 10/11/22 0841    sodium chloride flush 0.9 % injection 5-40 mL, 5-40 mL, IntraVENous, PRN, Luis Karyn, DO    0.9 % sodium chloride infusion, , IntraVENous, PRN, Luis Karyn, DO    polyethylene glycol (GLYCOLAX) packet 17 g, 17 g, Oral, Daily PRN, Luis Karyn, DO    acetaminophen (TYLENOL) tablet 650 mg, 650 mg, Oral, Q6H PRN **OR** acetaminophen (TYLENOL) suppository 650 mg, 650 mg, Rectal, Q6H PRN, Luis Karyn, DO    Allergies:  No Known Allergies    Past Psychiatric History (over the past 6 months, unless otherwise specified):  Previous diagnoses/symptoms: dementia  Self-injurious behavior/risky thoughts or behaviors (past suicidal ideation/attempt): denies  Violence/Risk to others (past homicidal ideation/attempt): denies  Current psychiatric provider: none  Previous psychiatric medication trials: Namenda  Previous psychiatric hospitalizations: denies  Previous therapy: denies  Previous ECT: denies    Past Medical History:  History of head trauma: denies  History of seizures: denies  History of Surgeries or Hospitalizations:   Past Surgical History:   Procedure Laterality Date    TOTAL COLECTOMY  1999      Other Past Medical History:   Active Ambulatory Problems     Diagnosis Date Noted    Dementia without behavioral disturbance ([facility] 09/19/2018    Type 2 diabetes with nephropathy ([address] 01/14/2019    Uterine mass 10/17/2018    Atrial fibrillation with RVR ([address] 09/19/2018    Chronic anticoagulation 10/19/2018    Acquired hypothyroidism 09/19/2018    Hypercholesterolemia 04/25/2019    Severe obesity (BMI 35.0-39. 9) with comorbidity ([facility] 01/14/2019    Inability to walk 08/11/2022    Acute pain of left knee 08/11/2022    Fall from ground level 08/11/2022     Resolved Ambulatory Problems     Diagnosis Date Noted    Class 2 obesity due to excess calories without serious comorbidity with body mass index (BMI) of 37.0 to 37.9 in adult 09/19/2018    Type 2 diabetes mellitus without complication, without long-term current use of insulin ([address] 09/19/2018     Past Medical History:   Diagnosis Date    Atrial fibrillation ([facility]     Colon cancer ([facility] 1999    Dementia ([facility]     Diabetes ([facility]     Menopause     Thyroid disease          Substance Use History (over the past 12 months, unless otherwise specified):   Tobacco: Denies  Caffeine: Endorses coffee  Alcohol: Endorses hx of occasional use  Marijuana: Denies  Cocaine: Denies  Opiates: Denies  Benzodiazepine: Denies  Other illicit drug usage: Denies    Legal consequences of substance/alcohol use: No  History of substance/alcohol abuse treatment: No  Readiness for substance/alcohol abuse treatment, if applicable: Not applicable    Past Family History:  Family history of mental health conditions: denies  Family history of suicide? denies    Social History:  Childhood:  pt states born in [place] / raised in [place] (by parents - [place]) / has 2 brothers / 2 sisters  Psychological trauma or Abuse: denies  Living Situation / Interest: pt states \"I dont know\" - noted in chart, [facility]  Education: pt states college - for psychology  Marital/Committed relationship and parenting history:   x1 () / had 2 step daughters with   Occupational History: [employer] - 23-24 yrs (retired)  Legal History: denies   Spiritual History: did not discuss    EVALUATION    Vitals:   Vitals:    10/11/22 1130   BP: 110/76   Pulse: 80   Resp: 16   Temp: 97.4 °F (36.3 °C)   SpO2: 98%       Labs:   Recent Results (from the past 24 hour(s))   POCT Glucose    Collection Time: 10/10/22  4:56 PM   Result Value Ref Range    POC Glucose 97 65 - 100 mg/dL    Performed by: Natalia Hays    POCT Glucose    Collection Time: 10/10/22  9:01 PM   Result Value Ref Range    POC Glucose 74 65 - 100 mg/dL    Performed by: Katia    POCT Glucose    Collection Time: 10/11/22 12:28 AM   Result Value Ref Range    POC Glucose 71 65 - 100 mg/dL    Performed by: Bob    CBC with Auto Differential    Collection Time: 10/11/22  6:11 AM   Result Value Ref Range    WBC 11.0 4.3 - 11.1 K/uL    RBC 4.73 4.05 - 5.2 M/uL    Hemoglobin 13.7 11.7 - 15.4 g/dL    Hematocrit 43.7 35.8 - 46.3 %    MCV 92.4 82 - 102 FL    MCH 29.0 26.1 - 32.9 PG    MCHC 31.4 31.4 - 35.0 g/dL    RDW 17.2 (H) 11.9 - 14.6 %    Platelets 245 (L) 423 - 450 K/uL    MPV 11.8 9.4 - 12.3 FL    nRBC 0.03 0.0 - 0.2 K/uL    Differential Type AUTOMATED      Seg Neutrophils 73 43 - 78 %    Lymphocytes 18 13 - 44 %    Monocytes 6 4.0 - 12.0 %    Eosinophils % 2 0.5 - 7.8 %    Basophils 0 0.0 - 2.0 %    Immature Granulocytes 1 0.0 - 5.0 %    Segs Absolute 8.1 1.7 - 8.2 K/UL    Absolute Lymph # 2.0 0.5 - 4.6 K/UL    Absolute Mono # 0.6 0.1 - 1.3 K/UL    Absolute Eos # 0.2 0.0 - 0.8 K/UL    Basophils Absolute 0.0 0.0 - 0.2 K/UL    Absolute Immature Granulocyte 0.1 0.0 - 0.5 K/UL   Comprehensive Metabolic Panel w/ Reflex to MG    Collection Time: 10/11/22  6:11 AM   Result Value Ref Range    Sodium 146 (H) 133 - 143 mmol/L    Potassium 4.3 3.5 - 5.1 mmol/L    Chloride 118 (H) 101 - 110 mmol/L    CO2 23 21 - 32 mmol/L    Anion Gap 5 2 - 11 mmol/L    Glucose 70 65 - 100 mg/dL    BUN 32 (H) 8 - 23 MG/DL    Creatinine 1.00 0.6 - 1.0 MG/DL    Est, Glom Filt Rate 59 (L) >60 ml/min/1.73m2    Calcium 8.5 8.3 - 10.4 MG/DL    Total Bilirubin 0.7 0.2 - 1.1 MG/DL    ALT 75 (H) 12 - 65 U/L    AST 51 (H) 15 - 37 U/L    Alk Phosphatase 90 50 - 136 U/L    Total Protein 5.5 (L) 6.3 - 8.2 g/dL    Albumin 2.8 (L) 3.2 - 4.6 g/dL    Globulin 2.7 (L) 2.8 - 4.5 g/dL    Albumin/Globulin Ratio 1.0 0.4 - 1.6     Phosphorus    Collection Time: 10/11/22  6:11 AM   Result Value Ref Range    Phosphorus 2.4 2.3 - 3.7 MG/DL   Vancomycin Level, Random    Collection Time: 10/11/22  6:11 AM   Result Value Ref Range    Vancomycin Rm 22.5 UG/ML       Medical Review Of Systems:  Psychological ROS: positive for - disorientation, confusion, memory difficulties   Psychological ROS: negative for - hallucinations, hostility, sleep disturbances, suicidal ideation, or homicidal ideation       Mental Status Evaluation:  General Appearance Appears stated age  General Behavior Calm, cooperative, good eye contact,  mild hesitant / guarded  Psychomotor Activity Normoactive - no restlessness or tremors noted at this time  Gait and Station Did not observe, pt in bed  Speech   Slow response  Mood                           \"ok\"  Affect    flat  Thought Process        confusion  Thought Content/Perceptual Disturbances denies suicidal/homicidal ideation, auditory/visual hallucinations, paranoia, delusions, grandiosity, increased goal directed activity, preoccupation, obsessions/compulsions and phobias  Cognition/Sensorium  Alert and oriented to name, age, , year, place (\"hospital\") / disoriented to month, situation - noted memory difficulties  Insight  impaired due to condition  Judgment impaired due to condition      Unable to complete PHQ or MONIQUE r/t condition      ASSESSMENT  Psychiatric Diagnoses:  History of dementia [Z80.56 (ICD-10-CM)], Memory difficulties [R41.3 (ICD-10-CM)], Confusion and disorientation [R41.0 (ICD-10-CM)]      Plan:  - 66 yo F with hx of dementia - admitted with sepsis concerning urinary tract infection and Staph bacteremia unclear source / currently on dementia / no other noted MH hx or dx - denies any current or active SI/HI/AVH and does not appear to be responding to any internal stimuli at this time - noted memory difficulties, disorientation, confusion  - Pt would benefit from referral to [facility] - for memory health assessment / work in conjunction with PCP for continued care and medical management associated with dementia  - No changes, adjustments or additions to medications at this time   - Continue to treat any medical problems  - Pt needs hospital follow up appt with PCP      Denia Mckeon PMHNP-BC  10/11/2022  [address]

## 2022-10-11 NOTE — PROGRESS NOTES
VANCO DAILY FOLLOW UP NOTE  [address] [facility] Pharmacokinetic Monitoring Service - Vancomycin    Consulting Provider: Yunior Hardy MD   Indication: UTI, sepsis  Target Concentration: Goal AUC/-600 mg*hr/L  Day of Therapy: 5  Additional Antimicrobials: ceftriaxone    Pertinent Laboratory Values: Wt Readings from Last 1 Encounters:   10/11/22 237 lb 14.4 oz (107.9 kg)     Temp Readings from Last 1 Encounters:   10/11/22 97.4 °F (36.3 °C) (Oral)     Recent Labs     10/09/22  0144 10/09/22  0941 10/10/22  0602 10/10/22  0953 10/11/22  0611   BUN 55*   < > 40* 38* 32*   CREATININE 1.60*   < > 1.30* 1.20* 1.00   WBC 20.4*  --  13.8*  --  11.0    < > = values in this interval not displayed. Estimated Creatinine Clearance: 63 mL/min (based on SCr of 1 mg/dL). Lab Results   Component Value Date/Time    VANCORANDOM 22.5 10/11/2022 06:11 AM       MRSA Nasal Swab: N/A. Non-respiratory infection.       Assessment:  Date/Time Dose Concentration AUC   10/11 0611 1000 mg q24h 22.5 371   Note: Serum concentrations collected for AUC dosing may appear elevated if collected in close proximity to the dose administered, this is not necessarily an indication of toxicity    Plan:  Current dosing regimen is sub-therapeutic  Increase dose to 1250 mg q24h for predicted AUC/Tr of 456/12.9  Repeat vancomycin concentrations will be ordered as clinically appropriate   Pharmacy will continue to monitor patient and adjust therapy as indicated    Thank you for the consult,  CHANTE Sandoval [facility]

## 2022-10-12 LAB
ALBUMIN SERPL-MCNC: 2.8 G/DL (ref 3.2–4.6)
ALBUMIN/GLOB SERPL: 0.9 {RATIO} (ref 0.4–1.6)
ALP SERPL-CCNC: 95 U/L (ref 50–136)
ALT SERPL-CCNC: 68 U/L (ref 12–65)
ANION GAP SERPL CALC-SCNC: 3 MMOL/L (ref 2–11)
AST SERPL-CCNC: 69 U/L (ref 15–37)
BACTERIA SPEC CULT: NORMAL
BASOPHILS # BLD: 0.1 K/UL (ref 0–0.2)
BASOPHILS NFR BLD: 0 % (ref 0–2)
BILIRUB SERPL-MCNC: 0.7 MG/DL (ref 0.2–1.1)
BUN SERPL-MCNC: 25 MG/DL (ref 8–23)
CALCIUM SERPL-MCNC: 8.5 MG/DL (ref 8.3–10.4)
CHLORIDE SERPL-SCNC: 118 MMOL/L (ref 101–110)
CO2 SERPL-SCNC: 21 MMOL/L (ref 21–32)
CREAT SERPL-MCNC: 1 MG/DL (ref 0.6–1)
DIFFERENTIAL METHOD BLD: ABNORMAL
EOSINOPHIL # BLD: 0.3 K/UL (ref 0–0.8)
EOSINOPHIL NFR BLD: 2 % (ref 0.5–7.8)
ERYTHROCYTE [DISTWIDTH] IN BLOOD BY AUTOMATED COUNT: 17 % (ref 11.9–14.6)
GLOBULIN SER CALC-MCNC: 3.2 G/DL (ref 2.8–4.5)
GLUCOSE BLD STRIP.AUTO-MCNC: 109 MG/DL (ref 65–100)
GLUCOSE SERPL-MCNC: 125 MG/DL (ref 65–100)
HCT VFR BLD AUTO: 45.1 % (ref 35.8–46.3)
HGB BLD-MCNC: 13.9 G/DL (ref 11.7–15.4)
IMM GRANULOCYTES # BLD AUTO: 0.2 K/UL (ref 0–0.5)
IMM GRANULOCYTES NFR BLD AUTO: 1 % (ref 0–5)
LYMPHOCYTES # BLD: 1.9 K/UL (ref 0.5–4.6)
LYMPHOCYTES NFR BLD: 17 % (ref 13–44)
MCH RBC QN AUTO: 28.5 PG (ref 26.1–32.9)
MCHC RBC AUTO-ENTMCNC: 30.8 G/DL (ref 31.4–35)
MCV RBC AUTO: 92.4 FL (ref 82–102)
MONOCYTES # BLD: 0.6 K/UL (ref 0.1–1.3)
MONOCYTES NFR BLD: 6 % (ref 4–12)
NEUTS SEG # BLD: 8.2 K/UL (ref 1.7–8.2)
NEUTS SEG NFR BLD: 73 % (ref 43–78)
NRBC # BLD: 0 K/UL (ref 0–0.2)
PHOSPHATE SERPL-MCNC: 2.4 MG/DL (ref 2.3–3.7)
PLATELET # BLD AUTO: 151 K/UL (ref 150–450)
PMV BLD AUTO: 11.3 FL (ref 9.4–12.3)
POTASSIUM SERPL-SCNC: 4.1 MMOL/L (ref 3.5–5.1)
PROT SERPL-MCNC: 6 G/DL (ref 6.3–8.2)
RBC # BLD AUTO: 4.88 M/UL (ref 4.05–5.2)
SERVICE CMNT-IMP: ABNORMAL
SERVICE CMNT-IMP: NORMAL
SODIUM SERPL-SCNC: 142 MMOL/L (ref 133–143)
WBC # BLD AUTO: 11.2 K/UL (ref 4.3–11.1)

## 2022-10-12 PROCEDURE — 6370000000 HC RX 637 (ALT 250 FOR IP): Performed by: FAMILY MEDICINE

## 2022-10-12 PROCEDURE — 2580000003 HC RX 258: Performed by: STUDENT IN AN ORGANIZED HEALTH CARE EDUCATION/TRAINING PROGRAM

## 2022-10-12 PROCEDURE — 36415 COLL VENOUS BLD VENIPUNCTURE: CPT

## 2022-10-12 PROCEDURE — 6370000000 HC RX 637 (ALT 250 FOR IP): Performed by: NURSE PRACTITIONER

## 2022-10-12 PROCEDURE — 6370000000 HC RX 637 (ALT 250 FOR IP): Performed by: INTERNAL MEDICINE

## 2022-10-12 PROCEDURE — 6370000000 HC RX 637 (ALT 250 FOR IP): Performed by: STUDENT IN AN ORGANIZED HEALTH CARE EDUCATION/TRAINING PROGRAM

## 2022-10-12 PROCEDURE — 2500000003 HC RX 250 WO HCPCS: Performed by: FAMILY MEDICINE

## 2022-10-12 PROCEDURE — 85025 COMPLETE CBC W/AUTO DIFF WBC: CPT

## 2022-10-12 PROCEDURE — 2580000003 HC RX 258: Performed by: INTERNAL MEDICINE

## 2022-10-12 PROCEDURE — 1100000003 HC PRIVATE W/ TELEMETRY

## 2022-10-12 PROCEDURE — 84100 ASSAY OF PHOSPHORUS: CPT

## 2022-10-12 PROCEDURE — 82962 GLUCOSE BLOOD TEST: CPT

## 2022-10-12 PROCEDURE — 80053 COMPREHEN METABOLIC PANEL: CPT

## 2022-10-12 PROCEDURE — 2580000003 HC RX 258: Performed by: FAMILY MEDICINE

## 2022-10-12 PROCEDURE — 6360000002 HC RX W HCPCS: Performed by: INTERNAL MEDICINE

## 2022-10-12 RX ORDER — AMOXICILLIN AND CLAVULANATE POTASSIUM 875; 125 MG/1; MG/1
1 TABLET, FILM COATED ORAL EVERY 12 HOURS SCHEDULED
Status: DISCONTINUED | OUTPATIENT
Start: 2022-10-12 | End: 2022-10-13 | Stop reason: HOSPADM

## 2022-10-12 RX ORDER — DOXYCYCLINE HYCLATE 100 MG/1
100 CAPSULE ORAL EVERY 12 HOURS SCHEDULED
Status: DISCONTINUED | OUTPATIENT
Start: 2022-10-12 | End: 2022-10-13 | Stop reason: HOSPADM

## 2022-10-12 RX ADMIN — TAMSULOSIN HYDROCHLORIDE 0.4 MG: 0.4 CAPSULE ORAL at 09:15

## 2022-10-12 RX ADMIN — NYSTATIN 500000 UNITS: 100000 SUSPENSION ORAL at 16:30

## 2022-10-12 RX ADMIN — NYSTATIN 500000 UNITS: 100000 SUSPENSION ORAL at 12:19

## 2022-10-12 RX ADMIN — APIXABAN 5 MG: 5 TABLET, FILM COATED ORAL at 09:15

## 2022-10-12 RX ADMIN — MEMANTINE 10 MG: 5 TABLET ORAL at 09:15

## 2022-10-12 RX ADMIN — ANTI-FUNGAL POWDER MICONAZOLE NITRATE TALC FREE: 1.42 POWDER TOPICAL at 20:55

## 2022-10-12 RX ADMIN — DEXTROSE AND SODIUM CHLORIDE: 5; 450 INJECTION, SOLUTION INTRAVENOUS at 16:46

## 2022-10-12 RX ADMIN — SODIUM CHLORIDE, PRESERVATIVE FREE 10 ML: 5 INJECTION INTRAVENOUS at 09:18

## 2022-10-12 RX ADMIN — METOPROLOL SUCCINATE 50 MG: 25 TABLET, FILM COATED, EXTENDED RELEASE ORAL at 09:15

## 2022-10-12 RX ADMIN — DOXYCYCLINE HYCLATE 100 MG: 100 CAPSULE ORAL at 20:53

## 2022-10-12 RX ADMIN — MEMANTINE 10 MG: 5 TABLET ORAL at 20:53

## 2022-10-12 RX ADMIN — LEVOTHYROXINE SODIUM 75 MCG: 0.05 TABLET ORAL at 06:07

## 2022-10-12 RX ADMIN — APIXABAN 5 MG: 5 TABLET, FILM COATED ORAL at 20:53

## 2022-10-12 RX ADMIN — SODIUM CHLORIDE, PRESERVATIVE FREE 5 ML: 5 INJECTION INTRAVENOUS at 20:54

## 2022-10-12 RX ADMIN — ANTI-FUNGAL POWDER MICONAZOLE NITRATE TALC FREE: 1.42 POWDER TOPICAL at 09:20

## 2022-10-12 RX ADMIN — AMOXICILLIN AND CLAVULANATE POTASSIUM 1 TABLET: 875; 125 TABLET, FILM COATED ORAL at 20:53

## 2022-10-12 RX ADMIN — NYSTATIN 500000 UNITS: 100000 SUSPENSION ORAL at 20:53

## 2022-10-12 RX ADMIN — NYSTATIN 500000 UNITS: 100000 SUSPENSION ORAL at 09:14

## 2022-10-12 RX ADMIN — DEXTROSE AND SODIUM CHLORIDE: 5; 450 INJECTION, SOLUTION INTRAVENOUS at 00:42

## 2022-10-12 RX ADMIN — CEFTRIAXONE 2000 MG: 2 INJECTION, POWDER, FOR SOLUTION INTRAMUSCULAR; INTRAVENOUS at 10:42

## 2022-10-12 ASSESSMENT — PAIN SCALES - GENERAL
PAINLEVEL_OUTOF10: 0

## 2022-10-12 NOTE — CONSULTS
Comprehensive Nutrition Assessment    Type and Reason for Visit: Initial, Consult  Poor Intake/Appetite 5 or More Days (Hospitalists)    Nutrition Recommendations/Plan:   Meals and Snacks:  Diet: Continue current order  Nutrition Supplement Therapy:  Medical food supplement therapy:  Initiate Ensure Enlive three times per day (this provides 350 kcal and 20 grams protein per bottle)       Malnutrition Assessment:  Malnutrition Status: Insufficient data (poor po in hospital (unsure prior); possible weight loss)     Nutrition Assessment:  Nutrition History: Attempted to call patient's sister Naomi Bhardwaj, with no answer. Patient with dementia and unable to provide any history or response. Per EMR patient has weight history of losing 15-25 lbs since Mid August. Unable to confirm with family about this, could it be fluid, did patient have trouble eating. Do You Have Any Cultural, Samaritan, or Ethnic Food Preferences?: No   Nutrition Background:       PMH: DM2, colon cancer, afib. Patient presented with fever, shortness of breath and afib. Also signs of UTI with bacteremia. Nutrition Interval:  Spoke with RN, Thelma. She reports patient only ate a few bites of breakfast but drank her milk and juice. She reports having patient over the weekend and patietn again not eating much but drank ensures, without affecting her blood sugars. Only needed coverage once. Will add Ensure since patient likes, and monitor blood sugars if get too high can change to EHP or glucerna. Lab Results   Component Value Date/Time    POCGLU 109 10/12/2022 12:54 AM    POCGLU 71 10/11/2022 12:28 AM    POCGLU 74 10/10/2022 09:01 PM    POCGLU 97 10/10/2022 04:56 PM    POCGLU 122 10/10/2022 11:32 AM    POCGLU 165 10/10/2022 07:18 AM        Current Nutrition Therapies:  ADULT DIET; Dysphagia - Pureed; 4 carb choices (60 gm/meal);  Low Fat/Low Chol/High Fiber/2 gm Na    Current Intake:   Average Meal Intake: 1-25% Average Supplements Intake: None Ordered      Anthropometric Measures:  Height: 5' 7\" (170.2 cm)  Current Body Wt: 237 lb 14 oz (107.9 kg) (10/11), Weight source: Standing Scale  BMI: 37.2, Obese Class 2 (BMI 35.0 -39. 9)  Admission Body Weight: 223 lb 8.7 oz (101.4 kg) (bed scale 10/8)  Ideal Body Weight (Kg) (Calculated): 61 kg (135 lbs), 176.2 %  Usual Body Wt:  , Percent weight change:           Edema:Peripheral Vascular  Peripheral Vascular (WDL): Exceptions to WDL  Edema: Right upper extremity; Left upper extremity;Right lower extremity; Left lower extremity  Edema Generalized: +1;Pitting;Trace  RUE Edema: Trace  LUE Edema: Trace  RLE Edema: +1;Pitting  LLE Edema: +1;Pitting   BMI Category Obese Class 2 (BMI 35.0 -39.9)  Estimated Daily Nutrient Needs:  Energy (kcal/day): 0048-8788 (12-15 kcal/kg) (Kcal/kg Weight used: 107.9 kg Other (Comment) (107.9 kg)  Protein (g/day): 65-81 (20% kcals) Weight Used: (Other (Comment) (107.9 kg)) 107.9 kg  Fluid (ml/day):   (1 ml/kcal)    Nutrition Diagnosis:   Inadequate oral intake related to cognitive or neurological impairment (dementia, poor appetite) as evidenced by intake 0-25% (puree diet)  Nutrition Interventions:   Food and/or Nutrient Delivery: Continue Current Diet, Start Oral Nutrition Supplement     Coordination of Nutrition Care: Continue to monitor while inpatient  Plan of Care discussed with: PENNY Renee    Goals: Active Goal: PO intake 50% or greater, by next RD assessment       Nutrition Monitoring and Evaluation:      Food/Nutrient Intake Outcomes: Food and Nutrient Intake, Supplement Intake  Physical Signs/Symptoms Outcomes: Meal Time Behavior, Weight, Biochemical Data, Chewing or Swallowing    Discharge Planning:     Too soon to determine    Electronically signed by Dominic Caldwell MS, RD, LD on 10/12/2022 at 11:00 AM.

## 2022-10-12 NOTE — PROGRESS NOTES
650 mg Rectal Q6H PRN         Objective:     Vitals:    10/12/22 0547 10/12/22 0810 10/12/22 0915 10/12/22 0939   BP: 129/71 120/83 130/61    Pulse: 79 97 93    Resp: 18 18     Temp: 97.3 °F (36.3 °C) 97.3 °F (36.3 °C)     TempSrc: Oral Oral     SpO2: 96% 98%     Weight: 201 lb 3.2 oz (91.3 kg)      Height:    5' 7\" (1.702 m)     Intake and Output:   10/10 1901 - 10/12 0700  In: 150 [P.O.:150]  Out: 910 [Urine:910]  10/12 0701 - 10/12 1900  In: 175 [P.O.:175]  Out: -     Physical Exam:   Constitutional:  the patient is well developed and in no acute distress  HEENT:  Sclera clear, pupils equal, oral mucosa moist  Lungs: Clear  Cardiovascular:  RRR without M,G,R  Abd/GI: soft and non-tender; with positive bowel sounds. Ext: warm without cyanosis. There is no lower leg edema. MRI BRAIN WO CONTRAST   Final Result   1. Mild chondrolysis examination due to motion. 2. Moderately advanced temporal lobe predominant volume loss. 3. Mild chronic small vessel ischemic change. US RETROPERITONEAL COMPLETE   Final Result   1. No evidence of hydronephrosis. 2. 6.7 cm right kidney simple cyst.   3. Normal left kidney. 4. Known pelvic mass. XR CHEST PORTABLE   Final Result   The lungs are clear. The heart is normal in size. No pneumothorax. No pleural effusions. LAB  Recent Labs     10/10/22  0602 10/11/22  0611 10/12/22  0630   WBC 13.8* 11.0 11.2*   HGB 14.1 13.7 13.9   HCT 45.4 43.7 45.1   * 143* 151       Recent Labs     10/10/22  0953 10/11/22  0611 10/12/22  0630    146* 142   K 4.3 4.3 4.1   * 118* 118*   CO2 16* 23 21   BUN 38* 32* 25*   CREATININE 1.20* 1.00 1.00   PHOS  --  2.4 2.4         No results for input(s): PH, PCO2, PO2, HCO3 in the last 72 hours. Plan:  (Medical Decision Making)     1. LACY  Improved with hydration           2. Hypernatremia  Corrected         3. Hypokalemia  Corrected         . 4. UTI  F/u  vancomycin level       5. A. fib RVR

## 2022-10-12 NOTE — PROGRESS NOTES
Physical Therapy Note:    Attempted to see patient this PM for physical therapy treatment  session. Patient refused, no reason given. Will follow and re-attempt as schedule permits/patient available.  Thank you,    Vic Pabon, PT     Rehab Caseload Tracker

## 2022-10-12 NOTE — PROGRESS NOTES
VANCO DAILY FOLLOW UP NOTE  [facility] Pharmacokinetic Monitoring Service - Vancomycin    Consulting Provider: Lakeshia Montalvo MD   Indication: UTI, sepsis  Target Concentration: Goal AUC/-600 mg*hr/L  Day of Therapy: 6  Additional Antimicrobials: ceftriaxone    Pertinent Laboratory Values: Wt Readings from Last 1 Encounters:   10/12/22 201 lb 3.2 oz (91.3 kg)     Temp Readings from Last 1 Encounters:   10/12/22 97.7 °F (36.5 °C) (Oral)     Recent Labs     10/10/22  0602 10/10/22  0953 10/11/22  0611 10/12/22  0630   BUN 40* 38* 32* 25*   CREATININE 1.30* 1.20* 1.00 1.00   WBC 13.8*  --  11.0 11.2*     Estimated Creatinine Clearance: 58 mL/min (based on SCr of 1 mg/dL). Lab Results   Component Value Date/Time    VANCORANDOM 22.5 10/11/2022 06:11 AM       MRSA Nasal Swab: N/A. Non-respiratory infection.       Assessment:  Date/Time Dose Concentration AUC   10/11 0611 1000 mg q24h 22.5 371   Note: Serum concentrations collected for AUC dosing may appear elevated if collected in close proximity to the dose administered, this is not necessarily an indication of toxicity    Plan:  Current dosing regimen is therapeutic  Continue current dose  Repeat vancomycin concentration ordered for 10/13 @ 0400    Pharmacy will continue to monitor patient and adjust therapy as indicated    Thank you for the consult,  Maria Fernanda Smith, [facility]

## 2022-10-12 NOTE — PROGRESS NOTES
No new discharge needs identified. Pt refusing to work with therapy today, simply stating \"no. \" Pt expected to return to her correction and resume therapy with PT/OT. Will arrange transport at discharge when stable.

## 2022-10-12 NOTE — PROGRESS NOTES
Infectious Disease Consult    Today's Date: 10/12/2022   Admit Date: 10/7/2022    Impression:   SIRS / Sepsis - presumed due to UTI (based on UA)  Coag-negative Staph bacteremia (10/7) - one of two; repeat cultures 10/11 so far NG  Soft tissue pelvic mass - suspected to be endometrial / fibroid - followed by Dr. Del Sanches fibrillation with RVR  LACY  Dementia    Plan:     Discontinue Vancomycin and Ceftriaxone today. Would recommend transitioning to Doxycycline 100 mg PO BID and Augmentin 875-125 mg PO BID for a total of 7 days, EOT 10/19/22  *The ID team will sign off today, but please call with any questions or concerns. Anti-infectives:   IV vanc  IV ceftriaxone    Subjective: Interval History: Afebrile. She denies any complaints or concerns. States she is doing \"well. \" Very pleasant. Patient is a 67 y.o. female with h/o dementia who presented from her correction with fever and rapid atrial fibrillation. Her admission urinalysis was concerning for UTI but admission UCX grew mixed hui. Admission [address] is positive for CoNS in 1/2 bottles. She is known to have a uterine mass that has been imaged recently at 15cm. Her fevers have improved on vancomycin plus ceftriaxone. ID is consulted for further recommendations. No Known Allergies     Review of Systems:  Complete ROS not obtained due to dementia    Objective:     Visit Vitals  /70   Pulse (!) 102   Temp 97.7 °F (36.5 °C) (Oral)   Resp 17   Ht 5' 7\" (1.702 m)   Wt 201 lb 3.2 oz (91.3 kg)   SpO2 98%   BMI 31.51 kg/m²     Temp (24hrs), Av.7 °F (36.5 °C), Min:97.3 °F (36.3 °C), Max:98.1 °F (36.7 °C)     Patient was seen and examined on 10/12/2022 and physical exam remains unchanged since yesterday, unless noted below:    Lines:  Peripheral IV:       Physical Exam:    General:  Alert, cooperative, in no acute distress   Eyes:  Sclera anicteric. Pupils equally round and reactive to light.    Mouth/Throat: Mucous membranes normal, oral pharynx clear Neck: Supple   Lungs:   Clear to auscultation bilaterally, good effort   CV:  Regular rate, irregular rhythm; no murmur   Abdomen:   Soft, non-tender.  bowel sounds normal. non-distended   Extremities: No cyanosis; moderate diffuse edema   Skin: Skin color, texture, turgor normal. no acute rash or lesions   Lymph nodes: Cervical and supraclavicular normal   Musculoskeletal: No swelling or deformity   Lines/Devices:  Intact, no erythema, drainage or tenderness   Psych: Alert and responsive, not oriented to situation       Data Review:     CBC:  Recent Labs     10/10/22  0602 10/11/22  0611 10/12/22  0630   WBC 13.8* 11.0 11.2*   HGB 14.1 13.7 13.9   HCT 45.4 43.7 45.1   * 143* 151         BMP:  Recent Labs     10/10/22  0953 10/11/22  0611 10/12/22  0630   BUN 38* 32* 25*    146* 142   K 4.3 4.3 4.1   * 118* 118*   CO2 16* 23 21         LFTS:  Recent Labs     10/11/22  0611 10/12/22  0630   ALT 75* 68*         Microbiology:   Reviewed    Imaging:   Reviewed     Signed By: VIJAYA Milner - KAREN     October 12, 2022

## 2022-10-12 NOTE — PROGRESS NOTES
OT Note:    OT attempted to see patient for therapy today. Pt did not want to participate at this time. OT will re-attempt to see patient at a later date/time.     Thanks,  JEFF Whitlock

## 2022-10-12 NOTE — PROGRESS NOTES
Progress Note    Patient: Adelfo Reid MRN: [id number]  SSN: [id number]    YOB: 1950  Age: 67 y.o. Sex: female      Admit Date: 10/7/2022    LOS: 5 days     Assessment and Plan:   43-year-old female with a past medical history of atrial fibrillation not on anticoagulation, history of colon cancer, dementia, diabetes, that presented in the setting of fevers    1. Sepsis concerning urinary tract infection and Staph bacteremia unclear source  Afebrile, no leukocytosis   urine culture negative   ceftriaxone and vancomycin discontinued  Transition to p.o. antibiotics Augmentin and doxycycline EOT 10/19/22  Repeat blood cultures negative  -Echocardiogram without signs of IE  ID signed off    Hypoglycemia  Resolved    Acute urinary retention  trial of void   Ultrasound showed no hydronephrosis but known pelvic mass  Continue Flomax   Patient is refusing MRI    Acute hypoxic respiratory failure  Patient is saturating well on RA  RT assessed and patient is on room air, no oxygen needs    2. Atrial fibrillation with RVR  No need of MYLA at this time  S/p Cardizem drip and heparin drip  -Telemetry monitoring  -Continue metoprolol  Continue Eliquis  Cardiology signed off    3. Mild hypernatremia  Resolved     LACY   Creatinine is back to  baseline  Cr improving with IVF   Ultrasound kidney shows simple cyst  Nephrology following, appreciate recommendations    4. Hypokalemia  Resolved     5. Thrush  -Continue nystatin    6. Diabetes mellitus  Hba1c 6.3  -Insulin sliding scale  -Blood sugar checks before meals and at bedtime    Thrombocytopenia  Platelet count improved    DVT prophylaxis Eliquis    Dispo anticipate hospital stay for 1 to 2 days and then STR      Subjective:   43-year-old female with a past medical history of atrial fibrillation not on anticoagulation, history of colon cancer, dementia, diabetes, that presented in the setting of fevers. Patient is seen and examined at bedside.    KG rinaldi limited due to dementia. Patient is refusing MRI of the abdomen. Objective:     Vitals:    10/12/22 0915 10/12/22 0939 10/12/22 1208 10/12/22 1632   BP: 130/61  123/70 131/79   Pulse: 93  (!) 102 69   Resp:   17 18   Temp:   97.7 °F (36.5 °C) 97.5 °F (36.4 °C)   TempSrc:   Oral Oral   SpO2:   98% 98%   Weight:       Height:  5' 7\" (1.702 m)          Intake and Output:  Current Shift: 10/12 0701 - 10/12 1900  In: 575 [P.O.:575]  Out: 100 [Urine:100]  Last three shifts: 10/10 1901 - 10/12 0700  In: 150 [P.O.:150]  Out: 910 [Urine:910]    ROS  10 ROS negative except from stated on subjective    Physical Exam:   General: Alert, oriented, NAD  HEENT: NC/AT, EOM are intact  Neck: supple, no JVD  Cardiovascular: RRR, S1, S2, no murmurs  Respiratory: Lungs are clear, no wheezes or rales  Abdomen: Soft, NT, ND  Back: No CVA tenderness, no paraspinal tenderness  Extremities: LE without pedal edema, no erythema  Neuro: A&O, CN are intact, no focal deficits  Skin: no rash or ulcers  Psych: good mood and affect    Lab/Data Review:  I have personally reviewed patients laboratory data showing  Recent Results (from the past 24 hour(s))   Culture, Blood 1    Collection Time: 10/11/22  8:31 PM    Specimen: Blood   Result Value Ref Range    Special Requests RIGHT  HAND        Culture NO GROWTH AFTER 13 HOURS     POCT Glucose    Collection Time: 10/12/22 12:54 AM   Result Value Ref Range    POC Glucose 109 (H) 65 - 100 mg/dL    Per[facility] by:  [facility]    Comprehensive Metabolic Panel w/ Reflex to MG    Collection Time: 10/12/22  6:30 AM   Result Value Ref Range    Sodium 142 133 - 143 mmol/L    Potassium 4.1 3.5 - 5.1 mmol/L    Chloride 118 (H) 101 - 110 mmol/L    CO2 21 21 - 32 mmol/L    Anion Gap 3 2 - 11 mmol/L    Glucose 125 (H) 65 - 100 mg/dL    BUN 25 (H) 8 - 23 MG/DL    Creatinine 1.00 0.6 - 1.0 MG/DL    Est, Glom Filt Rate 59 (L) >60 ml/min/1.73m2    Calcium 8.5 8.3 - 10.4 MG/DL    Total Bilirubin 0.7 0.2 - 1.1 MG/DL ALT 68 (H) 12 - 65 U/L    AST 69 (H) 15 - 37 U/L    Alk Phosphatase 95 50 - 136 U/L    Total Protein 6.0 (L) 6.3 - 8.2 g/dL    Albumin 2.8 (L) 3.2 - 4.6 g/dL    Globulin 3.2 2.8 - 4.5 g/dL    Albumin/Globulin Ratio 0.9 0.4 - 1.6     Phosphorus    Collection Time: 10/12/22  6:30 AM   Result Value Ref Range    Phosphorus 2.4 2.3 - 3.7 MG/DL   CBC with Auto Differential    Collection Time: 10/12/22  6:30 AM   Result Value Ref Range    WBC 11.2 (H) 4.3 - 11.1 K/uL    RBC 4.88 4.05 - 5.2 M/uL    Hemoglobin 13.9 11.7 - 15.4 g/dL    Hematocrit 45.1 35.8 - 46.3 %    MCV 92.4 82 - 102 FL    MCH 28.5 26.1 - 32.9 PG    MCHC 30.8 (L) 31.4 - 35.0 g/dL    RDW 17.0 (H) 11.9 - 14.6 %    Platelets 398 328 - 202 K/uL    MPV 11.3 9.4 - 12.3 FL    nRBC 0.00 0.0 - 0.2 K/uL    Differential Type AUTOMATED      Seg Neutrophils 73 43 - 78 %    Lymphocytes 17 13 - 44 %    Monocytes 6 4.0 - 12.0 %    Eosinophils % 2 0.5 - 7.8 %    Basophils 0 0.0 - 2.0 %    Immature Granulocytes 1 0.0 - 5.0 %    Segs Absolute 8.2 1.7 - 8.2 K/UL    Absolute Lymph # 1.9 0.5 - 4.6 K/UL    Absolute Mono # 0.6 0.1 - 1.3 K/UL    Absolute Eos # 0.3 0.0 - 0.8 K/UL    Basophils Absolute 0.1 0.0 - 0.2 K/UL    Absolute Immature Granulocyte 0.2 0.0 - 0.5 K/UL      [unfilled]     Image:  I have personally reviewed patients imaging showing  MRI BRAIN WO CONTRAST   Final Result   1. Mild chondrolysis examination due to motion. 2. Moderately advanced temporal lobe predominant volume loss. 3. Mild chronic small vessel ischemic change. US RETROPERITONEAL COMPLETE   Final Result   1. No evidence of hydronephrosis. 2. 6.7 cm right kidney simple cyst.   3. Normal left kidney. 4. Known pelvic mass. XR CHEST PORTABLE   Final Result   The lungs are clear. The heart is normal in size. No pneumothorax. No pleural effusions.               Current Facility-Administered Medications   Medication Dose Route Frequency Provider Last Rate Last Admin doxycycline hyclate (VIBRAMYCIN) capsule 100 mg  100 mg Oral 2 times per day VIJAYA Milner - KAREN        amoxicillin-clavulanate (AUGMENTIN) 875-125 MG per tablet 1 tablet  1 tablet Oral 2 times per day VIJAYA Milner - CNP        tamsulosin (FLOMAX) capsule 0.4 mg  0.4 mg Oral Daily Navneet Le MD   0.4 mg at 10/12/22 0915    dextrose 5 % and 0.45 % sodium chloride infusion   IntraVENous Continuous Navneet Le MD 75 mL/hr at 10/12/22 0042 New Bag at 10/12/22 0042    apixaban (ELIQUIS) tablet 5 mg  5 mg Oral BID Elaine Lew MD   5 mg at 10/12/22 0915    nystatin (MYCOSTATIN) 951320 UNIT/ML suspension 500,000 Units  5 mL Oral 4x Daily Aide Macias MD   500,000 Units at 10/12/22 1630    metoprolol succinate (TOPROL XL) extended release tablet 50 mg  50 mg Oral Daily Elaine Lew MD   50 mg at 10/12/22 0915    levothyroxine (SYNTHROID) tablet 75 mcg  75 mcg Oral Daily Christina Parth, DO   75 mcg at 10/12/22 3710    memantine (NAMENDA) tablet 10 mg  10 mg Oral BID Christina Parth, DO   10 mg at 10/12/22 0915    miconazole (MICOTIN) 2 % powder   Topical BID Christina Parth, DO   Given at 10/12/22 0920    glucose chewable tablet 16 g  4 tablet Oral PRN Christina Parth, DO        dextrose bolus 10% 125 mL  125 mL IntraVENous PRN Christina Parth, DO        Or    dextrose bolus 10% 250 mL  250 mL IntraVENous PRN Christina Parth, DO        glucagon (rDNA) injection 1 mg  1 mg SubCUTAneous PRN Christina Parth, DO        dextrose 10 % infusion   IntraVENous Continuous PRN Christina Parth, DO        sodium chloride flush 0.9 % injection 5-40 mL  5-40 mL IntraVENous 2 times per day Christina Parth, DO   10 mL at 10/12/22 0918    sodium chloride flush 0.9 % injection 5-40 mL  5-40 mL IntraVENous PRN Christina Parth, DO        0.9 % sodium chloride infusion   IntraVENous PRN Christina Parth, DO        polyethylene glycol (GLYCOLAX) packet 17 g  17 g Oral Daily PRN Nelida Yantic DO Reji        acetaminophen (TYLENOL) tablet 650 mg  650 mg Oral Q6H PRN Elenora Festus, DO        Or    acetaminophen (TYLENOL) suppository 650 mg  650 mg Rectal Q6H PRN Elenora Festus, DO            Hospital problems     Patient Active Problem List   Diagnosis    Dementia without behavioral disturbance (HCC)    Type 2 diabetes with nephropathy (HCC)    Uterine mass    Atrial fibrillation with RVR (HCC)    Chronic anticoagulation    Acquired hypothyroidism    Hypercholesterolemia    Severe obesity (BMI 35.0-39. 9) with comorbidity ([address]    Inability to walk    Acute pain of left knee    Fall from ground level    Acute sepsis ([address]    LACY (acute kidney injury) ([address]    Metabolic acidosis    Transaminitis    Hypernatremia    History of dementia    Memory difficulties    Confusion and disorientation        I have reviewed, updated, and verified this note's content and spent 36 minutes of my 40 minutes visit performing counseling and coordination of care regarding medical management.        Signed By: Carson Sheets MD     October 12, 2022

## 2022-10-13 VITALS
HEART RATE: 87 BPM | DIASTOLIC BLOOD PRESSURE: 92 MMHG | OXYGEN SATURATION: 96 % | SYSTOLIC BLOOD PRESSURE: 144 MMHG | TEMPERATURE: 97.6 F | HEIGHT: 67 IN | BODY MASS INDEX: 37.7 KG/M2 | RESPIRATION RATE: 16 BRPM | WEIGHT: 240.2 LBS

## 2022-10-13 LAB
ALBUMIN SERPL-MCNC: 2.5 G/DL (ref 3.2–4.6)
ALBUMIN/GLOB SERPL: 0.8 {RATIO} (ref 0.4–1.6)
ALP SERPL-CCNC: 91 U/L (ref 50–136)
ALT SERPL-CCNC: 52 U/L (ref 12–65)
ANION GAP SERPL CALC-SCNC: 9 MMOL/L (ref 2–11)
AST SERPL-CCNC: 39 U/L (ref 15–37)
BASOPHILS # BLD: 0 K/UL (ref 0–0.2)
BASOPHILS NFR BLD: 0 % (ref 0–2)
BILIRUB SERPL-MCNC: 0.3 MG/DL (ref 0.2–1.1)
BUN SERPL-MCNC: 25 MG/DL (ref 8–23)
CALCIUM SERPL-MCNC: 9.2 MG/DL (ref 8.3–10.4)
CHLORIDE SERPL-SCNC: 114 MMOL/L (ref 101–110)
CO2 SERPL-SCNC: 20 MMOL/L (ref 21–32)
CREAT SERPL-MCNC: 1 MG/DL (ref 0.6–1)
DIFFERENTIAL METHOD BLD: ABNORMAL
EOSINOPHIL # BLD: 0.2 K/UL (ref 0–0.8)
EOSINOPHIL NFR BLD: 2 % (ref 0.5–7.8)
ERYTHROCYTE [DISTWIDTH] IN BLOOD BY AUTOMATED COUNT: 16.8 % (ref 11.9–14.6)
GLOBULIN SER CALC-MCNC: 3 G/DL (ref 2.8–4.5)
GLUCOSE SERPL-MCNC: 254 MG/DL (ref 65–100)
HCT VFR BLD AUTO: 40.8 % (ref 35.8–46.3)
HGB BLD-MCNC: 12.8 G/DL (ref 11.7–15.4)
IMM GRANULOCYTES # BLD AUTO: 0.2 K/UL (ref 0–0.5)
IMM GRANULOCYTES NFR BLD AUTO: 2 % (ref 0–5)
LYMPHOCYTES # BLD: 1.5 K/UL (ref 0.5–4.6)
LYMPHOCYTES NFR BLD: 15 % (ref 13–44)
MCH RBC QN AUTO: 29.5 PG (ref 26.1–32.9)
MCHC RBC AUTO-ENTMCNC: 31.4 G/DL (ref 31.4–35)
MCV RBC AUTO: 94 FL (ref 82–102)
MONOCYTES # BLD: 0.6 K/UL (ref 0.1–1.3)
MONOCYTES NFR BLD: 6 % (ref 4–12)
NEUTS SEG # BLD: 7.3 K/UL (ref 1.7–8.2)
NEUTS SEG NFR BLD: 75 % (ref 43–78)
NRBC # BLD: 0.02 K/UL (ref 0–0.2)
PHOSPHATE SERPL-MCNC: 2 MG/DL (ref 2.3–3.7)
PLATELET # BLD AUTO: 168 K/UL (ref 150–450)
PMV BLD AUTO: 11.2 FL (ref 9.4–12.3)
POTASSIUM SERPL-SCNC: 3.8 MMOL/L (ref 3.5–5.1)
PROT SERPL-MCNC: 5.5 G/DL (ref 6.3–8.2)
RBC # BLD AUTO: 4.34 M/UL (ref 4.05–5.2)
SODIUM SERPL-SCNC: 143 MMOL/L (ref 133–143)
WBC # BLD AUTO: 9.8 K/UL (ref 4.3–11.1)

## 2022-10-13 PROCEDURE — 6370000000 HC RX 637 (ALT 250 FOR IP): Performed by: INTERNAL MEDICINE

## 2022-10-13 PROCEDURE — 2580000003 HC RX 258: Performed by: STUDENT IN AN ORGANIZED HEALTH CARE EDUCATION/TRAINING PROGRAM

## 2022-10-13 PROCEDURE — 85025 COMPLETE CBC W/AUTO DIFF WBC: CPT

## 2022-10-13 PROCEDURE — 6370000000 HC RX 637 (ALT 250 FOR IP): Performed by: FAMILY MEDICINE

## 2022-10-13 PROCEDURE — 6370000000 HC RX 637 (ALT 250 FOR IP): Performed by: STUDENT IN AN ORGANIZED HEALTH CARE EDUCATION/TRAINING PROGRAM

## 2022-10-13 PROCEDURE — 84100 ASSAY OF PHOSPHORUS: CPT

## 2022-10-13 PROCEDURE — 80053 COMPREHEN METABOLIC PANEL: CPT

## 2022-10-13 PROCEDURE — 36415 COLL VENOUS BLD VENIPUNCTURE: CPT

## 2022-10-13 PROCEDURE — 99231 SBSQ HOSP IP/OBS SF/LOW 25: CPT | Performed by: NURSE PRACTITIONER

## 2022-10-13 PROCEDURE — 6370000000 HC RX 637 (ALT 250 FOR IP): Performed by: NURSE PRACTITIONER

## 2022-10-13 PROCEDURE — 2580000003 HC RX 258: Performed by: FAMILY MEDICINE

## 2022-10-13 RX ORDER — AMOXICILLIN AND CLAVULANATE POTASSIUM 875; 125 MG/1; MG/1
1 TABLET, FILM COATED ORAL EVERY 12 HOURS SCHEDULED
Qty: 12 TABLET | Refills: 0 | Status: SHIPPED | OUTPATIENT
Start: 2022-10-13 | End: 2022-10-19

## 2022-10-13 RX ORDER — TAMSULOSIN HYDROCHLORIDE 0.4 MG/1
0.4 CAPSULE ORAL DAILY
Qty: 30 CAPSULE | Refills: 3 | Status: SHIPPED | OUTPATIENT
Start: 2022-10-14

## 2022-10-13 RX ORDER — METOPROLOL SUCCINATE 25 MG/1
50 TABLET, EXTENDED RELEASE ORAL DAILY
Qty: 30 TABLET | Refills: 3 | Status: SHIPPED | OUTPATIENT
Start: 2022-10-13

## 2022-10-13 RX ORDER — DOXYCYCLINE HYCLATE 100 MG/1
100 CAPSULE ORAL EVERY 12 HOURS SCHEDULED
Qty: 12 CAPSULE | Refills: 0 | Status: SHIPPED | OUTPATIENT
Start: 2022-10-13 | End: 2022-10-19

## 2022-10-13 RX ADMIN — MEMANTINE 10 MG: 5 TABLET ORAL at 10:13

## 2022-10-13 RX ADMIN — APIXABAN 5 MG: 5 TABLET, FILM COATED ORAL at 10:13

## 2022-10-13 RX ADMIN — LEVOTHYROXINE SODIUM 75 MCG: 0.05 TABLET ORAL at 05:22

## 2022-10-13 RX ADMIN — DEXTROSE AND SODIUM CHLORIDE: 5; 450 INJECTION, SOLUTION INTRAVENOUS at 05:22

## 2022-10-13 RX ADMIN — AMOXICILLIN AND CLAVULANATE POTASSIUM 1 TABLET: 875; 125 TABLET, FILM COATED ORAL at 10:14

## 2022-10-13 RX ADMIN — SODIUM CHLORIDE, PRESERVATIVE FREE 10 ML: 5 INJECTION INTRAVENOUS at 10:13

## 2022-10-13 RX ADMIN — NYSTATIN 500000 UNITS: 100000 SUSPENSION ORAL at 10:19

## 2022-10-13 RX ADMIN — DOXYCYCLINE HYCLATE 100 MG: 100 CAPSULE ORAL at 10:14

## 2022-10-13 RX ADMIN — METOPROLOL SUCCINATE 50 MG: 25 TABLET, FILM COATED, EXTENDED RELEASE ORAL at 10:14

## 2022-10-13 RX ADMIN — TAMSULOSIN HYDROCHLORIDE 0.4 MG: 0.4 CAPSULE ORAL at 10:13

## 2022-10-13 RX ADMIN — ANTI-FUNGAL POWDER MICONAZOLE NITRATE TALC FREE: 1.42 POWDER TOPICAL at 10:17

## 2022-10-13 ASSESSMENT — PAIN SCALES - GENERAL
PAINLEVEL_OUTOF10: 0

## 2022-10-13 NOTE — CARE COORDINATION
Discharge order is in. Pt is discharging and transferring back to her [facility], [facility], Rm #[id number]. Call for report: [phone], ask for either Erin or Pearl Flaherty. Transport scheduled for 1630. CM spoke with April in Admissions and confirmed that the pt does not need a COVID to return and informed her of transport time. CM spoke to the pts sister, Corry Diaz, who expressed concern that her sister is discharging \"so soon and no one has told me anything about what's going on;\" asked to speak to the MD. Forwarded the message. MD was on the phone with her sister and updated her on transport time. No other discharge needs were identified. Tx goals have been met       10/13/22 [address] Discharge   Transition of Care Consult (CM Consult) Discharge Planning;Assisted Living  ([facility]  [facility])   [facility] Discharge Assisted living   [address] Provided? No   [address] of [address] at [address]charge [address] Time of Discharge 1630   Confirm Follow Up Transport Family   Condition of Participation: Discharge Planning   The Patient and/or Patient Representative was provided with a Choice of Provider? Patient   The Patient and/Or Patient Representative agree with the Discharge Plan? Yes   Freedom of Choice list was provided with basic dialogue that supports the patient's individualized plan of care/goals, treatment preferences, and shares the quality data associated with the providers?   Yes

## 2022-10-13 NOTE — DISCHARGE SUMMARY
Hospitalist Discharge Summary   Admit Date:  10/7/2022 10:59 AM   DC Note date: 10/13/2022  Name:  Cinthya Alejandre   Age:  67 y.o. Sex:  female  :  1950   MRN:  [id number]   Room:  [facility]/  PCP:  VIJAYA Sheehan CNP    Presenting Complaint: Shortness of Breath     Initial Admission Diagnosis: Hypernatremia [E87.0]  Atrial fibrillation with rapid ventricular response (HCC) [I48.91]  Acute kidney injury ([address] [N17.9]  Severe sepsis ([address] [A41.9, R65.20]  Urinary tract infection with hematuria, site unspecified [N39.0, R31.9]  Acute sepsis ([address] [A41.9]     Problem List for this Hospitalization (present on admission):    Principal Problem:    Acute sepsis ([address]  Active Problems:    Inability to walk    LACY (acute kidney injury) ([address]    Metabolic acidosis    Transaminitis    Hypernatremia    History of dementia    Memory difficulties    Confusion and disorientation    Dementia without behavioral disturbance ([address]    Type 2 diabetes with nephropathy ([address]    Atrial fibrillation with RVR ([address]    Acquired hypothyroidism    Hypercholesterolemia  Resolved Problems:    * No resolved hospital problems. [facility] Course:  Ms Lisa Bourgeois is a 68 yo female with PMH of atrial fibrillation, colon cancer, dementia, DM who presented to the ER from her TRUNG on 10/7/2022 with c/o dyspnea, elevated HR, and fever. EMS noted pt was in atrial fibrillation with RVR, and pt ws given a bolus of amiodarone en route to the hospital.  Work up revealed fever, tachycardia, hypernatremia, LACY, elevated LA, and leukocytosis. She was treated with vancomycin plus ceftriaxone. Urinalysis was concerning for UTI but admission UCX grew mixed hui. Blood cultures were positive for CoNS in 1/2 bottles. ID, Nephrology, and Cardiology was consulted. -Echocardiogram without signs of IE. Pt refused MRI of the abdomen. MRI of the brain showed moderately advanced temporal lobe predominant volume loss.  She was transitioned to p.o. antibiotics Augmentin and doxycycline. Pt is hemodynamically stable for discharge. Acute urinary retention  Ultrasound showed no hydronephrosis but known pelvic mass. Continue Flomax   Patient refused MRI     Acute hypoxic respiratory failure  Resolved   Patient is saturating well on RA  RT assessed and patient is on room air, no oxygen needs     Atrial fibrillation with RVR  S/p Cardizem drip and heparin drip  -Continue metoprolol  Continue Eliquis    Mild hypernatremia  Resolved with D5 and IVF      LACY   Resolved with IVF and Cr back to baseline    Hypokalemia  Resolved with K supplementation     5. Thrush  -Continue nystatin    6. Diabetes mellitus  Hba1c 6.3  -Insulin sliding scale  -Blood sugar checks before meals and at bedtime         Disposition: TRUNG  Diet: ADULT DIET; Dysphagia - Pureed; 4 carb choices (60 gm/meal); Low Fat/Low Chol/High Fiber/2 gm Na  ADULT ORAL NUTRITION SUPPLEMENT; Breakfast, Lunch, Dinner; Standard High Calorie/High Protein Oral Supplement  Code Status: Full Code    Follow Ups:   Follow-up Information     Mary Christian, APRN - CNP Follow up in 1 week(s). Specialty: Nurse Practitioner  Contact information:  [address] [place] [address] [phone]                       Time spent in patient discharge and coordination 35 minutes. Follow up labs/diagnostics (ultimately defer to outpatient provider): Follow-up with PCP in 3 to 5 days      Plan was discussed with patient. All questions answered. Patient was stable at time of discharge. Instructions given to call a physician or return if any concerns.     Current Discharge Medication List        START taking these medications    Details   apixaban (ELIQUIS) 5 MG TABS tablet Take 1 tablet by mouth 2 times daily  Qty: 60 tablet, Refills: 0      tamsulosin (FLOMAX) 0.4 MG capsule Take 1 capsule by mouth daily  Qty: 30 capsule, Refills: 3      nystatin (MYCOSTATIN) 917569 UNIT/ML suspension Take 5 mLs by mouth 4 times daily  Qty: 1 each, Refills: 0      amoxicillin-clavulanate (AUGMENTIN) 875-125 MG per tablet Take 1 tablet by mouth every 12 hours for 12 doses  Qty: 12 tablet, Refills: 0      doxycycline hyclate (VIBRAMYCIN) 100 MG capsule Take 1 capsule by mouth every 12 hours for 12 doses  Qty: 12 capsule, Refills: 0           CONTINUE these medications which have CHANGED    Details   metoprolol succinate (TOPROL XL) 25 MG extended release tablet Take 2 tablets by mouth daily  Qty: 30 tablet, Refills: 3           CONTINUE these medications which have NOT CHANGED    Details   FREESTYLE LITE strip USE TO CHECK BLOOD GLUCOSE ONCE DAILY  Qty: 100 strip, Refills: 3      FreeStyle Lancets MISC USE TO CHECK BLOOD GLUCOSE ONCE DAILY  Qty: 100 each, Refills: 3      acetaminophen (TYLENOL) 325 MG tablet Take 650 mg by mouth every 4 hours as needed      ascorbic acid (VITAMIN C) 250 MG tablet Take 250 mg by mouth daily      atorvastatin (LIPITOR) 40 MG tablet TAKE 1 TABLET DAILY      glyBURIDE-metFORMIN (GLUCOVANCE) 2.5-500 MG per tablet TAKE 2 TABLETS TWICE A DAY WITH MEALS      levothyroxine (SYNTHROID) 75 MCG tablet TAKE 1 TABLET DAILY BEFORE BREAKFAST      memantine (NAMENDA) 10 MG tablet TAKE 1 TABLET TWICE A DAY      nystatin (MYCOSTATIN) 765443 UNIT/GM powder Apply powder to skin folds between breasts and abdomen 4 times daily      SITagliptin (JANUVIA) 100 MG tablet Take 100 mg by mouth daily             Procedures done this admission:  * No surgery found *    Consults this admission:  IP CONSULT TO CARDIOLOGY  IP CONSULT TO NEPHROLOGY  IP CONSULT TO INFECTIOUS DISEASES  IP CONSULT TO DIETITIAN  IP CONSULT TO PALLIATIVE CARE  IP CONSULT TO PSYCHIATRY  IP CONSULT TO CASE MANAGEMENT    Echocardiogram results:  10/07/22    TRANSTHORACIC ECHOCARDIOGRAM (TTE) COMPLETE (CONTRAST/BUBBLE/3D PRN) 10/09/2022  8:03 AM (Final)    Interpretation Summary    Left Ventricle: Normal left ventricular systolic function.  Left ventricle size is normal. Mildly increased wall thickness. Normal wall motion. Mitral Valve: Mild regurgitation. Left Atrium: Left atrium is mildly dilated. Technical qualifiers: Technically difficult study, color flow Doppler was performed and pulse wave and/or continuous wave Doppler was performed. Contrast used: Definity. Signed by: Talon Max MD on 10/9/2022  8:03 AM      Diagnostic Imaging/Tests:   XR CHEST PORTABLE    Result Date: 10/7/2022  The lungs are clear. The heart is normal in size. No pneumothorax. No pleural effusions. US RETROPERITONEAL COMPLETE    Result Date: 10/9/2022  1. No evidence of hydronephrosis. 2. 6.7 cm right kidney simple cyst. 3. Normal left kidney. 4. Known pelvic mass. MRI BRAIN WO CONTRAST    Result Date: 10/11/2022  1. Mild chondrolysis examination due to motion. 2. Moderately advanced temporal lobe predominant volume loss. 3. Mild chronic small vessel ischemic change.         Labs: Results:       BMP, Mg, Phos Recent Labs     10/11/22  0611 10/12/22  0630 10/13/22  0414   * 142 143   K 4.3 4.1 3.8   * 118* 114*   CO2 23 21 20*   ANIONGAP 5 3 9   BUN 32* 25* 25*   CREATININE 1.00 1.00 1.00   LABGLOM 59* 59* 59*   CALCIUM 8.5 8.5 9.2   GLUCOSE 70 125* 254*   PHOS 2.4 2.4 2.0*      CBC Recent Labs     10/11/22  0611 10/12/22  0630 10/13/22  0414   WBC 11.0 11.2* 9.8   RBC 4.73 4.88 4.34   HGB 13.7 13.9 12.8   HCT 43.7 45.1 40.8   MCV 92.4 92.4 94.0   MCH 29.0 28.5 29.5   MCHC 31.4 30.8* 31.4   RDW 17.2* 17.0* 16.8*   * 151 168   MPV 11.8 11.3 11.2   NRBC 0.03 0.00 0.02   SEGS 73 73 75   LYMPHOPCT 18 17 15   EOSRELPCT 2 2 2   MONOPCT 6 6 6   BASOPCT 0 0 0   IMMGRAN 1 1 2   SEGSABS 8.1 8.2 7.3   LYMPHSABS 2.0 1.9 1.5   EOSABS 0.2 0.3 0.2   MONOSABS 0.6 0.6 0.6   BASOSABS 0.0 0.1 0.0   ABSIMMGRAN 0.1 0.2 0.2      LFT Recent Labs     10/11/22  0611 10/12/22  0630 10/13/22  0414   BILITOT 0.7 0.7 0.3   ALKPHOS 90 95 91   AST 51* 69* 39*   ALT 75* 68* 52 PROT 5.5* 6.0* 5.5*   LABALBU 2.8* 2.8* 2.5*   GLOB 2.7* 3.2 3.0      Cardiac  No results found for: NTPROBNP, TROPHS   Coags Lab Results   Component Value Date/Time    PROTIME >120.0 10/07/2022 03:21 PM    INR >16.2 10/07/2022 03:21 PM    APTT >200.0 10/07/2022 03:21 PM      A1c Lab Results   Component Value Date/Time    LABA1C 6.3 03/14/2022 02:23 AM    LABA1C 6.0 09/03/2021 09:05 AM    LABA1C 6.5 05/28/2021 10:36 AM     03/14/2022 02:23 AM     09/03/2021 09:05 AM     05/28/2021 10:36 AM      Lipids Lab Results   Component Value Date/Time    CHOL 131 03/14/2022 02:23 AM    LDLCALC 51 03/14/2022 02:23 AM    LABVLDL 29 02/04/2020 12:17 PM    HDL 53 03/14/2022 02:23 AM    TRIG 158 03/14/2022 02:23 AM      Thyroid  No results found for: Merril Monday     Most Recent UA Lab Results   Component Value Date/Time    COLORU ORANGE 10/07/2022 11:33 AM    APPEARANCE TURBID 10/07/2022 11:33 AM    SPECGRAV 1.028 10/07/2022 11:33 AM    LABPH 5.0 10/07/2022 11:33 AM    PROTEINU 100 10/07/2022 11:33 AM    GLUCOSEU Negative 10/07/2022 11:33 AM    KETUA TRACE 10/07/2022 11:33 AM    BILIRUBINUR MODERATE 10/07/2022 11:33 AM    BLOODU LARGE 10/07/2022 11:33 AM    UROBILINOGEN 1.0 10/07/2022 11:33 AM    NITRU Positive 10/07/2022 11:33 AM    LEUKOCYTESUR LARGE 10/07/2022 11:33 AM    WBCUA >100 10/07/2022 11:33 AM    RBCUA >100 10/07/2022 11:33 AM    BACTERIA 4+ 10/07/2022 11:33 AM        Recent Labs     10/11/22  2031 10/09/22  0941 10/07/22  1133 10/07/22  1118 10/07/22  1114   CULTURE NO GROWTH 2 DAYS  NO GROWTH 2 DAYS NO GROWTH 4 DAYS >100,000 COLONIES/mL MIXED SKIN BUSTER ISOLATED  THREE OR MORE TYPES OF ORGANISMS ARE PRESENT. THIS IS INDICATIVE OF CONTAMINATION DUE TO IMPROPER COLLECTION TECHNIQUE. PLEASE REPEAT COLLECTION UNLESS PATIENT HAS STARTED ANTIBIOTIC TREATMENT. NO GROWTH 5 DAYS STAPHYLOCOCCUS SPECIES, COAGULASE NEGATIVE This organism may be indicative of culture contamination.  Clinical correlation needs to be evaluated as each case is unique. *  Refer to Blood Culture ID Panel Accession [id number]       All Labs from Last 24 Hrs:  Recent Results (from the past 24 hour(s))   Comprehensive Metabolic Panel w/ Reflex to MG    Collection Time: 10/13/22  4:14 AM   Result Value Ref Range    Sodium 143 133 - 143 mmol/L    Potassium 3.8 3.5 - 5.1 mmol/L    Chloride 114 (H) 101 - 110 mmol/L    CO2 20 (L) 21 - 32 mmol/L    Anion Gap 9 2 - 11 mmol/L    Glucose 254 (H) 65 - 100 mg/dL    BUN 25 (H) 8 - 23 MG/DL    Creatinine 1.00 0.6 - 1.0 MG/DL    Est, Glom Filt Rate 59 (L) >60 ml/min/1.73m2    Calcium 9.2 8.3 - 10.4 MG/DL    Total Bilirubin 0.3 0.2 - 1.1 MG/DL    ALT 52 12 - 65 U/L    AST 39 (H) 15 - 37 U/L    Alk Phosphatase 91 50 - 136 U/L    Total Protein 5.5 (L) 6.3 - 8.2 g/dL    Albumin 2.5 (L) 3.2 - 4.6 g/dL    Globulin 3.0 2.8 - 4.5 g/dL    Albumin/Globulin Ratio 0.8 0.4 - 1.6     Phosphorus    Collection Time: 10/13/22  4:14 AM   Result Value Ref Range    Phosphorus 2.0 (L) 2.3 - 3.7 MG/DL   CBC with Auto Differential    Collection Time: 10/13/22  4:14 AM   Result Value Ref Range    WBC 9.8 4.3 - 11.1 K/uL    RBC 4.34 4.05 - 5.2 M/uL    Hemoglobin 12.8 11.7 - 15.4 g/dL    Hematocrit 40.8 35.8 - 46.3 %    MCV 94.0 82 - 102 FL    MCH 29.5 26.1 - 32.9 PG    MCHC 31.4 31.4 - 35.0 g/dL    RDW 16.8 (H) 11.9 - 14.6 %    Platelets 310 758 - 909 K/uL    MPV 11.2 9.4 - 12.3 FL    nRBC 0.02 0.0 - 0.2 K/uL    Differential Type AUTOMATED      Seg Neutrophils 75 43 - 78 %    Lymphocytes 15 13 - 44 %    Monocytes 6 4.0 - 12.0 %    Eosinophils % 2 0.5 - 7.8 %    Basophils 0 0.0 - 2.0 %    Immature Granulocytes 2 0.0 - 5.0 %    Segs Absolute 7.3 1.7 - 8.2 K/UL    Absolute Lymph # 1.5 0.5 - 4.6 K/UL    Absolute Mono # 0.6 0.1 - 1.3 K/UL    Absolute Eos # 0.2 0.0 - 0.8 K/UL    Basophils Absolute 0.0 0.0 - 0.2 K/UL    Absolute Immature Granulocyte 0.2 0.0 - 0.5 K/UL       No Known Allergies  Immunization History Administered Date(s) Administered    COVID-19, MODERNA BLUE border, Primary or Immunocompromised, (age 12y+), IM, 100 mcg/0.5mL 01/13/2021, 02/10/2021, 12/20/2021    Influenza Virus Vaccine 09/26/2007, 09/26/2011, 09/10/2020    Influenza, FLUARIX, FLULAVAL, [address] (age 10 mo+) AND AFLURIA, (age 1 y+), PF, 0.5mL 10/11/2018, 10/29/2019    PPD Test 08/12/2022, 10/08/2022    Pneumococcal Conjugate 13-valent ([id number]) 01/23/2019    Pneumococcal Polysaccharide ([id number]) 02/12/2015    Tdap (Boostrix, Adacel) 02/12/2015    Zoster Recombinant (Shingrix) 06/23/2019, 09/24/2019       Recent Vital Data:  Patient Vitals for the past 24 hrs:   Temp Pulse Resp BP SpO2   10/13/22 1525 97.6 °F (36.4 °C) 87 16 (!) 144/92 96 %   10/13/22 1123 98 °F (36.7 °C) 93 16 136/80 98 %   10/13/22 1014 -- 77 -- 130/79 --   10/13/22 0742 97.6 °F (36.4 °C) 99 16 132/89 97 %   10/13/22 0530 97.2 °F (36.2 °C) 70 16 108/82 99 %   10/13/22 0018 97.6 °F (36.4 °C) 79 16 136/82 96 %   10/12/22 2118 97.7 °F (36.5 °C) 83 18 130/64 98 %   10/12/22 1632 97.5 °F (36.4 °C) 69 18 131/79 98 %       Oxygen Therapy  SpO2: 96 %  Pulse Oximeter Device Mode: Intermittent  O2 Device: None (Room air)  O2 Flow Rate (L/min): 0 L/min    Estimated body mass index is 37.62 kg/m² as calculated from the following:    Height as of this encounter: 5' 7\" (1.702 m). Weight as of this encounter: 240 lb 3.2 oz (109 kg). Intake/Output Summary (Last 24 hours) at 10/13/2022 1549  Last data filed at 10/13/2022 0525  Gross per 24 hour   Intake 320 ml   Output 300 ml   Net 20 ml         Physical Exam:    General:    Well nourished. No overt distress  Head:  Normocephalic, atraumatic  Eyes:  Sclerae appear normal.  Pupils equally round. HENT:  Nares appear normal, no drainage. Moist mucous membranes  Neck:  No restricted ROM. Trachea midline  CV:   RRR. No m/r/g. No JVD  Lungs:   CTAB. No wheezing, rhonchi, or rales.   Respirations even, unlabored  Abdomen:   Soft, nontender, nondistended. Extremities: Warm and dry. No cyanosis or clubbing. No edema. Skin:     No rashes. Normal coloration  Neuro:  CN II-XII grossly intact. Psych:  Normal mood and affect. Signed:  Cherlyn Litten, MD    Part of this note may have been written by using a voice dictation software. The note has been proof read but may still contain some grammatical/other typographical errors.

## 2022-10-13 NOTE — PROGRESS NOTES
Palliative Care Progress Note    Patient: Navi Harp MRN: [id number]  SSN: [id number]    YOB: 1950  Age: 67 y.o. Sex: female       Assessment/Plan:     Chief Complaint/Interval History: alert, confused at present       Principal Diagnosis:    Debility, Unspecified  R53.81    Additional Diagnoses:   Dementia  F03.90   Frailty  R54  Encounter for Palliative Care  Z51.5    Palliative Performance Scale (PPS)       Medical Decision Making:   Reviewed and summarized notes over last 24 hours   Discussed case with appropriate providers. Reviewed laboratory and x-ray data over last 24 hours     Pt resting in bed, appears comfortable. No visitors at bedside. Pt is pleasantly confused at present. She does deny pain and any other symptom. MRI of the abdomen could not be completed, and presumed source of sepsis is UTI. Pt has been transitioned to oral antibiotics, and will likely discharge soon. When I said this to pt, she stated she liked it here and wanted to stay. Assured her of our ongoing care. No further PC needs- we will sign off. Thank you for allowing us to participate in Ms Zazueta's care. Will discuss findings with members of the interdisciplinary team.         More than 50% of this 15 minute visit was spent counseling and coordination of care as outlined above. Subjective:     Review of Systems:  Review of systems not obtained due to patient factors- dementia      Objective:     Visit Vitals  /79   Pulse 77   Temp 97.6 °F (36.4 °C) (Oral)   Resp 16   Ht 5' 7\" (1.702 m)   Wt 240 lb 3.2 oz (109 kg)   SpO2 97%   BMI 37.62 kg/m²       Physical Exam:    General:  Alert. Debilitated. No acute distress. Eyes:  Conjunctivae/corneas clear    Nose: Nares normal. Septum midline.    Neck: Supple, symmetrical, trachea midline   Lungs:   Clear to auscultation bilaterally, unlabored   Heart:  Regular rate and rhythm   Abdomen:   Soft, non-tender, non-distended   Extremities: Normal, atraumatic, no cyanosis or edema   Skin: Skin color, texture, turgor normal.   Neurologic: Nonfocal   Psych: Alert and confused     Signed By: VIJAYA Anderson - KAREN     October 13, 2022

## 2022-10-13 NOTE — PROGRESS NOTES
Physician Progress Note      Hugh LEIJA #:                  [id number]  :                       1950  ADMIT DATE:       10/7/2022 10:59 AM  DISCH DATE:  RESPONDING  PROVIDER #:        Matthew Muniz MD          QUERY TEXT:    Patient admitted with sepsis. Noted documentation of acute respiratory   failure on October 10. In order to support the diagnosis of acute respiratory   failure, please include additional clinical indicators in your documentation. Or please document if the diagnosis of acute respiratory failure has been   ruled out after further study. The medical record reflects the following:  Risk Factors: sepsis, afib with RVR  Clinical Indicators: As per ED triage note, RR 40 with O2 sat 97 on RA. No   ABGs. Treatment: Supplemental oxygen at 4l and now weaned to RA on October 10. [email or web address]    Acute Respiratory Failure Clinical Indicators per [employer] [employer] Training Guide and   Quick Reference Guide:  pO2 < 60 mmHg or SpO2 (pulse oximetry) < 91% breathing room air  pCO2 > 50 and pH < 7.35  P/F ratio (pO2 / FIO2) < 300  pO2 decrease or pCO2 increase by 10 mmHg from baseline (if known)  Supplemental oxygen of 40% or more  Presence of respiratory distress, tachypnea, dyspnea, shortness of breath,   wheezing  Unable to speak in complete sentences  Use of accessory muscles to breathe  Extreme anxiety and feeling of impending doom  Tripod position  Confusion/altered mental status/obtunded  Options provided:  -- Acute Respiratory Failure, POA,  as evidenced by, Please document evidence. -- Acute Respiratory Failure ruled out after study  -- Other - I will add my own diagnosis  -- Disagree - Not applicable / Not valid  -- Disagree - Clinically unable to determine / Unknown  -- Refer to Clinical Documentation Reviewer    PROVIDER RESPONSE TEXT:    Acute Respiratory Failure has been ruled out after study.     Query created by: Mauro Arguello on 10/13/2022 2:17 PM      Electronically signed by:  Ligia Obrien MD 10/13/2022 2:51 PM

## 2022-10-13 NOTE — PROGRESS NOTES
Renata Sam  Admission Date: 10/7/2022         [facility] Progress Note: 10/13/2022    Follow-up for: LACY    The patient's chart is reviewed and the patient is discussed with the staff.     Subjective:     Not talking much secondary to her dementia      ROS:  Gen - no fever, no chills, appetite unchanged  CV - no chest pain, no palpitation  Lung - no shortness of breath, no cough  Abd - no tenderness, no nausea/vomiting, no diarrhea  Ext - no edema    Current Facility-Administered Medications   Medication Dose Route Frequency    doxycycline hyclate (VIBRAMYCIN) capsule 100 mg  100 mg Oral 2 times per day    amoxicillin-clavulanate (AUGMENTIN) 875-125 MG per tablet 1 tablet  1 tablet Oral 2 times per day    tamsulosin (FLOMAX) capsule 0.4 mg  0.4 mg Oral Daily    dextrose 5 % and 0.45 % sodium chloride infusion   IntraVENous Continuous    apixaban (ELIQUIS) tablet 5 mg  5 mg Oral BID    nystatin (MYCOSTATIN) 767254 UNIT/ML suspension 500,000 Units  5 mL Oral 4x Daily    metoprolol succinate (TOPROL XL) extended release tablet 50 mg  50 mg Oral Daily    levothyroxine (SYNTHROID) tablet 75 mcg  75 mcg Oral Daily    memantine (NAMENDA) tablet 10 mg  10 mg Oral BID    miconazole (MICOTIN) 2 % powder   Topical BID    glucose chewable tablet 16 g  4 tablet Oral PRN    dextrose bolus 10% 125 mL  125 mL IntraVENous PRN    Or    dextrose bolus 10% 250 mL  250 mL IntraVENous PRN    glucagon (rDNA) injection 1 mg  1 mg SubCUTAneous PRN    dextrose 10 % infusion   IntraVENous Continuous PRN    sodium chloride flush 0.9 % injection 5-40 mL  5-40 mL IntraVENous 2 times per day    sodium chloride flush 0.9 % injection 5-40 mL  5-40 mL IntraVENous PRN    0.9 % sodium chloride infusion   IntraVENous PRN    polyethylene glycol (GLYCOLAX) packet 17 g  17 g Oral Daily PRN    acetaminophen (TYLENOL) tablet 650 mg  650 mg Oral Q6H PRN    Or    acetaminophen (TYLENOL) suppository 650 mg  650 mg Rectal Q6H PRN Objective:     Vitals:    10/13/22 0530 10/13/22 0545 10/13/22 0742 10/13/22 1014   BP: 108/82  132/89 130/79   Pulse: 70  99 77   Resp: 16  16    Temp: 97.2 °F (36.2 °C)  97.6 °F (36.4 °C)    TempSrc: Axillary  Oral    SpO2: 99%  97%    Weight:  240 lb 3.2 oz (109 kg)     Height:         Intake and Output:   10/11 1901 - 10/13 0700  In: 695 [P.O.:695]  Out: 560 [Urine:560]  No intake/output data recorded. Physical Exam:   Constitutional:  the patient is well developed and in no acute distress  HEENT:  Sclera clear, pupils equal, oral mucosa moist  Lungs: Clear  Cardiovascular:  RRR without M,G,R  Abd/GI: soft and non-tender; with positive bowel sounds. Ext: warm without cyanosis. There is no lower leg edema. MRI BRAIN WO CONTRAST   Final Result   1. Mild chondrolysis examination due to motion. 2. Moderately advanced temporal lobe predominant volume loss. 3. Mild chronic small vessel ischemic change. US RETROPERITONEAL COMPLETE   Final Result   1. No evidence of hydronephrosis. 2. 6.7 cm right kidney simple cyst.   3. Normal left kidney. 4. Known pelvic mass. XR CHEST PORTABLE   Final Result   The lungs are clear. The heart is normal in size. No pneumothorax. No pleural effusions. LAB  Recent Labs     10/11/22  0611 10/12/22  0630 10/13/22  0414   WBC 11.0 11.2* 9.8   HGB 13.7 13.9 12.8   HCT 43.7 45.1 40.8   * 151 168       Recent Labs     10/11/22  0611 10/12/22  0630 10/13/22  0414   * 142 143   K 4.3 4.1 3.8   * 118* 114*   CO2 23 21 20*   BUN 32* 25* 25*   CREATININE 1.00 1.00 1.00   PHOS 2.4 2.4 2.0*         No results for input(s): PH, PCO2, PO2, HCO3 in the last 72 hours. Plan:  (Medical Decision Making)     1. LCAY  Resolved with hydration           2. Hypernatremia  Corrected         3. Hypokalemia  Corrected         . 4.  UTI    5.  A. fib RVR  Sign off   [employer]

## 2022-10-14 LAB
BACTERIA SPEC CULT: NORMAL
SERVICE CMNT-IMP: NORMAL

## 2022-10-16 LAB
BACTERIA SPEC CULT: NORMAL
BACTERIA SPEC CULT: NORMAL
SERVICE CMNT-IMP: NORMAL
SERVICE CMNT-IMP: NORMAL

## 2022-10-26 ENCOUNTER — APPOINTMENT (OUTPATIENT)
Dept: ULTRASOUND IMAGING | Age: 72
DRG: 683 | End: 2022-10-26
Payer: MEDICARE

## 2022-10-26 ENCOUNTER — HOSPITAL ENCOUNTER (INPATIENT)
Age: 72
LOS: 2 days | Discharge: HOME HEALTH | DRG: 683 | End: 2022-10-28
Attending: EMERGENCY MEDICINE | Admitting: FAMILY MEDICINE
Payer: MEDICARE

## 2022-10-26 ENCOUNTER — APPOINTMENT (OUTPATIENT)
Dept: CT IMAGING | Age: 72
DRG: 683 | End: 2022-10-26
Payer: MEDICARE

## 2022-10-26 DIAGNOSIS — N17.9 ACUTE KIDNEY INJURY (HCC): ICD-10-CM

## 2022-10-26 DIAGNOSIS — R60.0 LOWER EXTREMITY EDEMA: ICD-10-CM

## 2022-10-26 DIAGNOSIS — N30.01 ACUTE CYSTITIS WITH HEMATURIA: Primary | ICD-10-CM

## 2022-10-26 PROBLEM — E66.9 OBESITY (BMI 30.0-34.9): Status: ACTIVE | Noted: 2019-01-14

## 2022-10-26 PROBLEM — N93.9 VAGINAL BLEEDING: Status: ACTIVE | Noted: 2022-01-01

## 2022-10-26 PROBLEM — R65.10 SIRS (SYSTEMIC INFLAMMATORY RESPONSE SYNDROME) (HCC): Status: ACTIVE | Noted: 2022-01-01

## 2022-10-26 PROBLEM — R31.9 HEMATURIA: Status: ACTIVE | Noted: 2022-01-01

## 2022-10-26 PROBLEM — N39.0 ACUTE UTI: Status: ACTIVE | Noted: 2022-10-26

## 2022-10-26 LAB
ALBUMIN SERPL-MCNC: 3.3 G/DL (ref 3.2–4.6)
ALBUMIN/GLOB SERPL: 0.9 (ref 0.4–1.6)
ALP SERPL-CCNC: 82 U/L (ref 50–136)
ALT SERPL-CCNC: 31 U/L (ref 12–65)
ANION GAP SERPL CALC-SCNC: 5 MMOL/L (ref 2–11)
APPEARANCE UR: ABNORMAL
AST SERPL-CCNC: 40 U/L (ref 15–37)
BACTERIA URNS QL MICRO: ABNORMAL /HPF
BASOPHILS # BLD: 0.1 K/UL (ref 0–0.2)
BASOPHILS NFR BLD: 1 % (ref 0–2)
BILIRUB SERPL-MCNC: 0.6 MG/DL (ref 0.2–1.1)
BILIRUB UR QL: ABNORMAL
BUN SERPL-MCNC: 53 MG/DL (ref 8–23)
CALCIUM SERPL-MCNC: 9.2 MG/DL (ref 8.3–10.4)
CASTS URNS QL MICRO: 0 /LPF
CHLORIDE SERPL-SCNC: 123 MMOL/L (ref 101–110)
CO2 SERPL-SCNC: 23 MMOL/L (ref 21–32)
COLOR UR: ABNORMAL
CREAT SERPL-MCNC: 1.7 MG/DL (ref 0.6–1)
CRYSTALS URNS QL MICRO: 0 /LPF
DIFFERENTIAL METHOD BLD: ABNORMAL
EOSINOPHIL # BLD: 0.2 K/UL (ref 0–0.8)
EOSINOPHIL NFR BLD: 1 % (ref 0.5–7.8)
EPI CELLS #/AREA URNS HPF: 0 /HPF
ERYTHROCYTE [DISTWIDTH] IN BLOOD BY AUTOMATED COUNT: 19.1 % (ref 11.9–14.6)
EST. AVERAGE GLUCOSE BLD GHB EST-MCNC: 117 MG/DL
GLOBULIN SER CALC-MCNC: 3.5 G/DL (ref 2.8–4.5)
GLUCOSE BLD STRIP.AUTO-MCNC: 134 MG/DL (ref 65–100)
GLUCOSE BLD STRIP.AUTO-MCNC: 72 MG/DL (ref 65–100)
GLUCOSE SERPL-MCNC: 94 MG/DL (ref 65–100)
GLUCOSE UR STRIP.AUTO-MCNC: NEGATIVE MG/DL
HBA1C MFR BLD: 5.7 % (ref 4.8–5.6)
HCT VFR BLD AUTO: 48.8 % (ref 35.8–46.3)
HGB BLD-MCNC: 14.9 G/DL (ref 11.7–15.4)
HGB UR QL STRIP: ABNORMAL
IMM GRANULOCYTES # BLD AUTO: 0.2 K/UL (ref 0–0.5)
IMM GRANULOCYTES NFR BLD AUTO: 2 % (ref 0–5)
INR PPP: 1.1
KETONES UR QL STRIP.AUTO: ABNORMAL MG/DL
LACTATE SERPL-SCNC: 1.7 MMOL/L (ref 0.4–2)
LEUKOCYTE ESTERASE UR QL STRIP.AUTO: ABNORMAL
LYMPHOCYTES # BLD: 2.8 K/UL (ref 0.5–4.6)
LYMPHOCYTES NFR BLD: 24 % (ref 13–44)
MCH RBC QN AUTO: 29.4 PG (ref 26.1–32.9)
MCHC RBC AUTO-ENTMCNC: 30.5 G/DL (ref 31.4–35)
MCV RBC AUTO: 96.3 FL (ref 82–102)
MONOCYTES # BLD: 0.9 K/UL (ref 0.1–1.3)
MONOCYTES NFR BLD: 8 % (ref 4–12)
MUCOUS THREADS URNS QL MICRO: 0 /LPF
NEUTS SEG # BLD: 7.5 K/UL (ref 1.7–8.2)
NEUTS SEG NFR BLD: 65 % (ref 43–78)
NITRITE UR QL STRIP.AUTO: POSITIVE
NRBC # BLD: 0.03 K/UL (ref 0–0.2)
OTHER OBSERVATIONS: ABNORMAL
PH UR STRIP: 6.5 (ref 5–9)
PLATELET # BLD AUTO: 297 K/UL (ref 150–450)
PMV BLD AUTO: 10.2 FL (ref 9.4–12.3)
POTASSIUM SERPL-SCNC: 4 MMOL/L (ref 3.5–5.1)
PROT SERPL-MCNC: 6.8 G/DL (ref 6.3–8.2)
PROT UR STRIP-MCNC: 100 MG/DL
PROTHROMBIN TIME: 15.1 SEC (ref 12.6–14.3)
RBC # BLD AUTO: 5.07 M/UL (ref 4.05–5.2)
RBC #/AREA URNS HPF: >100 /HPF
SERVICE CMNT-IMP: ABNORMAL
SERVICE CMNT-IMP: NORMAL
SODIUM SERPL-SCNC: 151 MMOL/L (ref 133–143)
SP GR UR REFRACTOMETRY: 1.02 (ref 1–1.02)
UROBILINOGEN UR QL STRIP.AUTO: 1 EU/DL (ref 0.2–1)
WBC # BLD AUTO: 11.6 K/UL (ref 4.3–11.1)
WBC URNS QL MICRO: ABNORMAL /HPF

## 2022-10-26 PROCEDURE — 85025 COMPLETE CBC W/AUTO DIFF WBC: CPT

## 2022-10-26 PROCEDURE — 87040 BLOOD CULTURE FOR BACTERIA: CPT

## 2022-10-26 PROCEDURE — 83036 HEMOGLOBIN GLYCOSYLATED A1C: CPT

## 2022-10-26 PROCEDURE — 6370000000 HC RX 637 (ALT 250 FOR IP): Performed by: FAMILY MEDICINE

## 2022-10-26 PROCEDURE — 80053 COMPREHEN METABOLIC PANEL: CPT

## 2022-10-26 PROCEDURE — 2500000003 HC RX 250 WO HCPCS: Performed by: FAMILY MEDICINE

## 2022-10-26 PROCEDURE — 6360000002 HC RX W HCPCS: Performed by: EMERGENCY MEDICINE

## 2022-10-26 PROCEDURE — 93970 EXTREMITY STUDY: CPT

## 2022-10-26 PROCEDURE — 82962 GLUCOSE BLOOD TEST: CPT

## 2022-10-26 PROCEDURE — 74176 CT ABD & PELVIS W/O CONTRAST: CPT

## 2022-10-26 PROCEDURE — 1100000003 HC PRIVATE W/ TELEMETRY

## 2022-10-26 PROCEDURE — 96365 THER/PROPH/DIAG IV INF INIT: CPT

## 2022-10-26 PROCEDURE — 2580000003 HC RX 258: Performed by: EMERGENCY MEDICINE

## 2022-10-26 PROCEDURE — 2580000003 HC RX 258: Performed by: FAMILY MEDICINE

## 2022-10-26 PROCEDURE — 87086 URINE CULTURE/COLONY COUNT: CPT

## 2022-10-26 PROCEDURE — 83605 ASSAY OF LACTIC ACID: CPT

## 2022-10-26 PROCEDURE — 99285 EMERGENCY DEPT VISIT HI MDM: CPT

## 2022-10-26 PROCEDURE — 81001 URINALYSIS AUTO W/SCOPE: CPT

## 2022-10-26 PROCEDURE — 85610 PROTHROMBIN TIME: CPT

## 2022-10-26 RX ORDER — LEVOTHYROXINE SODIUM 0.07 MG/1
75 TABLET ORAL DAILY
Status: DISCONTINUED | OUTPATIENT
Start: 2022-10-27 | End: 2022-10-28 | Stop reason: HOSPADM

## 2022-10-26 RX ORDER — TAMSULOSIN HYDROCHLORIDE 0.4 MG/1
0.4 CAPSULE ORAL DAILY
Status: DISCONTINUED | OUTPATIENT
Start: 2022-10-26 | End: 2022-10-28 | Stop reason: HOSPADM

## 2022-10-26 RX ORDER — ACETAMINOPHEN 650 MG/1
650 SUPPOSITORY RECTAL EVERY 6 HOURS PRN
Status: DISCONTINUED | OUTPATIENT
Start: 2022-10-26 | End: 2022-10-28 | Stop reason: HOSPADM

## 2022-10-26 RX ORDER — SODIUM CHLORIDE 0.9 % (FLUSH) 0.9 %
5-40 SYRINGE (ML) INJECTION EVERY 12 HOURS SCHEDULED
Status: DISCONTINUED | OUTPATIENT
Start: 2022-10-26 | End: 2022-10-28 | Stop reason: HOSPADM

## 2022-10-26 RX ORDER — ASCORBIC ACID 500 MG
250 TABLET ORAL DAILY
Status: DISCONTINUED | OUTPATIENT
Start: 2022-10-26 | End: 2022-10-28 | Stop reason: HOSPADM

## 2022-10-26 RX ORDER — ONDANSETRON 4 MG/1
4 TABLET, ORALLY DISINTEGRATING ORAL EVERY 8 HOURS PRN
Status: DISCONTINUED | OUTPATIENT
Start: 2022-10-26 | End: 2022-10-28 | Stop reason: HOSPADM

## 2022-10-26 RX ORDER — ATORVASTATIN CALCIUM 40 MG/1
40 TABLET, FILM COATED ORAL DAILY
Status: DISCONTINUED | OUTPATIENT
Start: 2022-10-26 | End: 2022-10-28 | Stop reason: HOSPADM

## 2022-10-26 RX ORDER — SODIUM CHLORIDE 9 MG/ML
INJECTION, SOLUTION INTRAVENOUS PRN
Status: DISCONTINUED | OUTPATIENT
Start: 2022-10-26 | End: 2022-10-28 | Stop reason: HOSPADM

## 2022-10-26 RX ORDER — ONDANSETRON 2 MG/ML
4 INJECTION INTRAMUSCULAR; INTRAVENOUS EVERY 6 HOURS PRN
Status: DISCONTINUED | OUTPATIENT
Start: 2022-10-26 | End: 2022-10-28 | Stop reason: HOSPADM

## 2022-10-26 RX ORDER — METOPROLOL SUCCINATE 50 MG/1
50 TABLET, EXTENDED RELEASE ORAL DAILY
Status: DISCONTINUED | OUTPATIENT
Start: 2022-10-26 | End: 2022-10-28 | Stop reason: HOSPADM

## 2022-10-26 RX ORDER — ACETAMINOPHEN 325 MG/1
650 TABLET ORAL EVERY 6 HOURS PRN
Status: DISCONTINUED | OUTPATIENT
Start: 2022-10-26 | End: 2022-10-28 | Stop reason: HOSPADM

## 2022-10-26 RX ORDER — INSULIN LISPRO 100 [IU]/ML
0-4 INJECTION, SOLUTION INTRAVENOUS; SUBCUTANEOUS NIGHTLY
Status: DISCONTINUED | OUTPATIENT
Start: 2022-10-26 | End: 2022-10-28 | Stop reason: HOSPADM

## 2022-10-26 RX ORDER — POLYETHYLENE GLYCOL 3350 17 G/17G
17 POWDER, FOR SOLUTION ORAL DAILY PRN
Status: DISCONTINUED | OUTPATIENT
Start: 2022-10-26 | End: 2022-10-28 | Stop reason: HOSPADM

## 2022-10-26 RX ORDER — SACCHAROMYCES BOULARDII 250 MG
250 CAPSULE ORAL 2 TIMES DAILY
Status: DISCONTINUED | OUTPATIENT
Start: 2022-10-26 | End: 2022-10-28 | Stop reason: HOSPADM

## 2022-10-26 RX ORDER — FAMOTIDINE 20 MG/1
20 TABLET, FILM COATED ORAL DAILY
Status: DISCONTINUED | OUTPATIENT
Start: 2022-10-26 | End: 2022-10-28 | Stop reason: HOSPADM

## 2022-10-26 RX ORDER — 0.9 % SODIUM CHLORIDE 0.9 %
1000 INTRAVENOUS SOLUTION INTRAVENOUS
Status: DISCONTINUED | OUTPATIENT
Start: 2022-10-26 | End: 2022-10-26

## 2022-10-26 RX ORDER — DEXTROSE MONOHYDRATE 100 MG/ML
INJECTION, SOLUTION INTRAVENOUS CONTINUOUS PRN
Status: DISCONTINUED | OUTPATIENT
Start: 2022-10-26 | End: 2022-10-28 | Stop reason: HOSPADM

## 2022-10-26 RX ORDER — MEMANTINE HYDROCHLORIDE 5 MG/1
10 TABLET ORAL 2 TIMES DAILY
Status: DISCONTINUED | OUTPATIENT
Start: 2022-10-26 | End: 2022-10-28 | Stop reason: HOSPADM

## 2022-10-26 RX ORDER — DEXTROSE MONOHYDRATE 50 MG/ML
INJECTION, SOLUTION INTRAVENOUS CONTINUOUS
Status: DISCONTINUED | OUTPATIENT
Start: 2022-10-26 | End: 2022-10-28 | Stop reason: HOSPADM

## 2022-10-26 RX ORDER — INSULIN LISPRO 100 [IU]/ML
0-4 INJECTION, SOLUTION INTRAVENOUS; SUBCUTANEOUS
Status: DISCONTINUED | OUTPATIENT
Start: 2022-10-26 | End: 2022-10-28 | Stop reason: HOSPADM

## 2022-10-26 RX ORDER — SODIUM CHLORIDE 0.9 % (FLUSH) 0.9 %
5-40 SYRINGE (ML) INJECTION PRN
Status: DISCONTINUED | OUTPATIENT
Start: 2022-10-26 | End: 2022-10-28 | Stop reason: HOSPADM

## 2022-10-26 RX ADMIN — SODIUM CHLORIDE, PRESERVATIVE FREE 10 ML: 5 INJECTION INTRAVENOUS at 21:28

## 2022-10-26 RX ADMIN — Medication 250 MG: at 21:28

## 2022-10-26 RX ADMIN — CEFTRIAXONE 1000 MG: 1 INJECTION, POWDER, FOR SOLUTION INTRAMUSCULAR; INTRAVENOUS at 15:41

## 2022-10-26 RX ADMIN — METOPROLOL SUCCINATE 50 MG: 50 TABLET, EXTENDED RELEASE ORAL at 19:10

## 2022-10-26 RX ADMIN — OXYCODONE HYDROCHLORIDE AND ACETAMINOPHEN 250 MG: 500 TABLET ORAL at 19:09

## 2022-10-26 RX ADMIN — DEXTROSE MONOHYDRATE: 5 INJECTION, SOLUTION INTRAVENOUS at 19:18

## 2022-10-26 RX ADMIN — ATORVASTATIN CALCIUM 40 MG: 40 TABLET, FILM COATED ORAL at 19:09

## 2022-10-26 RX ADMIN — TUBERCULIN PURIFIED PROTEIN DERIVATIVE 5 UNITS: 5 INJECTION, SOLUTION INTRADERMAL at 19:10

## 2022-10-26 RX ADMIN — TAMSULOSIN HYDROCHLORIDE 0.4 MG: 0.4 CAPSULE ORAL at 19:09

## 2022-10-26 RX ADMIN — FAMOTIDINE 20 MG: 20 TABLET, FILM COATED ORAL at 19:09

## 2022-10-26 RX ADMIN — SODIUM CHLORIDE 1000 ML: 9 INJECTION, SOLUTION INTRAVENOUS at 15:06

## 2022-10-26 RX ADMIN — MEMANTINE 10 MG: 5 TABLET ORAL at 21:28

## 2022-10-26 ASSESSMENT — ENCOUNTER SYMPTOMS
VOMITING: 0
ABDOMINAL PAIN: 0

## 2022-10-26 ASSESSMENT — PAIN - FUNCTIONAL ASSESSMENT: PAIN_FUNCTIONAL_ASSESSMENT: NONE - DENIES PAIN

## 2022-10-26 NOTE — ED PROVIDER NOTES
Emergency Department Provider Note                   PCP:                VIJAYA Carson CNP               Age: 67 y.o. Sex: female     No diagnosis found. DISPOSITION          MDM  Number of Diagnoses or Management Options  Diagnosis management comments: Patient is awake and alert. Does not have abdominal tenderness. Plan for screening labs. We will perform genitourinary exam.    2:40 PM  No vaginal bleeding noted on genitourinary exam, cath urine shows brownish-reddish urine likely the source of bleeding noted at nursing facility    3:33 PM  Will discuss case with hospitalist for admission given LACY and UTI       Amount and/or Complexity of Data Reviewed  Clinical lab tests: ordered and reviewed    Risk of Complications, Morbidity, and/or Mortality  Presenting problems: moderate  Diagnostic procedures: moderate  Management options: moderate    Patient Progress  Patient progress: stable             Orders Placed This Encounter   Procedures    CBC with Auto Differential    CMP    Protime-INR    Straight cath        Medications - No data to display    New Prescriptions    No medications on file        Sunset Flaco is a 67 y.o. female who presents to the Emergency Department with chief complaint of    Chief Complaint   Patient presents with    Vaginal Bleeding      Patient presents the ER with concerns about vaginal bleeding. She presents from assisted living memory care and apparently she has had some degree of vaginal bleeding for possibly a month. She is on Eliquis for A. fib and they have been holding her Eliquis with hopes that her bleeding stopped however apparently discontinued. History is limited given patient's dementia. She denies any abdominal pain. Denies any fevers or chills. Did have a hospitalization earlier this month for UTI and sepsis. The history is provided by the patient, the EMS personnel and the nursing home. The history is limited by the condition of the patient. Vaginal Bleeding  Quality:  Unable to specify  Severity:  Moderate  Onset quality:  Gradual  Duration:  3 weeks  Chronicity:  New  Possible pregnancy: no    Associated symptoms: no abdominal pain and no fever        Review of Systems   Unable to perform ROS: Dementia   Constitutional:  Negative for fever. Gastrointestinal:  Negative for abdominal pain and vomiting. Genitourinary:  Positive for vaginal bleeding. Neurological:  Negative for weakness. All other systems reviewed and are negative. Past Medical History:   Diagnosis Date    Atrial fibrillation ([facility]     Colon cancer ([facility] 1999    Dementia ([facility]     with memory loss    Diabetes ([facility]     Menopause     Thyroid disease     Uterine mass         Past Surgical History:   Procedure Laterality Date    TOTAL COLECTOMY  1999        Family History   Problem Relation Age of Onset    Diabetes Mother     Diabetes Father         Social History     Socioeconomic History    Marital status:    Tobacco Use    Smoking status: Never    Smokeless tobacco: Never   Substance and Sexual Activity    Alcohol use: No    Drug use: No         Patient has no known allergies.      Previous Medications    ACETAMINOPHEN (TYLENOL) 325 MG TABLET    Take 650 mg by mouth every 4 hours as needed    APIXABAN (ELIQUIS) 5 MG TABS TABLET    Take 1 tablet by mouth 2 times daily    ASCORBIC ACID (VITAMIN C) 250 MG TABLET    Take 250 mg by mouth daily    ATORVASTATIN (LIPITOR) 40 MG TABLET    TAKE 1 TABLET DAILY    FREESTYLE LANCETS MISC    USE TO CHECK BLOOD GLUCOSE ONCE DAILY    FREESTYLE LITE STRIP    USE TO CHECK BLOOD GLUCOSE ONCE DAILY    GLYBURIDE-METFORMIN (GLUCOVANCE) 2.5-500 MG PER TABLET    TAKE 2 TABLETS TWICE A DAY WITH MEALS    LEVOTHYROXINE (SYNTHROID) 75 MCG TABLET    TAKE 1 TABLET DAILY BEFORE BREAKFAST    MEMANTINE (NAMENDA) 10 MG TABLET    TAKE 1 TABLET TWICE A DAY    METOPROLOL SUCCINATE (TOPROL XL) 25 MG EXTENDED RELEASE TABLET    Take 2 tablets by mouth daily NYSTATIN (MYCOSTATIN) 178331 UNIT/GM POWDER    Apply powder to skin folds between breasts and abdomen 4 times daily    NYSTATIN (MYCOSTATIN) 022112 UNIT/ML SUSPENSION    Take 5 mLs by mouth 4 times daily    SITAGLIPTIN (JANUVIA) 100 MG TABLET    Take 100 mg by mouth daily    TAMSULOSIN (FLOMAX) 0.4 MG CAPSULE    Take 1 capsule by mouth daily        Vitals signs and nursing note reviewed. No data found. Physical Exam  Vitals and nursing note reviewed. Constitutional:       Appearance: Normal appearance. HENT:      Head: Normocephalic and atraumatic. Right Ear: External ear normal.      Left Ear: External ear normal.      Nose: Nose normal. No congestion or rhinorrhea. Eyes:      Extraocular Movements: Extraocular movements intact. Pupils: Pupils are equal, round, and reactive to light. Cardiovascular:      Rate and Rhythm: Normal rate and regular rhythm. Pulses: Normal pulses. Heart sounds: Normal heart sounds. Pulmonary:      Effort: Pulmonary effort is normal.      Breath sounds: Normal breath sounds. Abdominal:      General: Abdomen is flat. There is no distension. Tenderness: There is no abdominal tenderness. Musculoskeletal:         General: No swelling, tenderness, deformity or signs of injury. Normal range of motion. Cervical back: Normal range of motion and neck supple. Skin:     General: Skin is warm. Coloration: Skin is not jaundiced or pale. Findings: No bruising. Neurological:      General: No focal deficit present. Mental Status: She is alert. Cranial Nerves: No cranial nerve deficit. Sensory: No sensory deficit. Motor: No weakness. Psychiatric:         Mood and Affect: Mood normal.         Behavior: Behavior normal.        Procedures    No results found for any visits on 10/26/22. No orders to display                       Voice dictation software was used during the making of this note.   This software is not perfect and grammatical and other typographical errors may be present. This note has not been completely proofread for errors.      Kenny Ramírez MD  10/26/22 [address], [place]  10/26/22 [address], MD  10/26/22 [address], MD  10/26/22 1486

## 2022-10-26 NOTE — ED NOTES
TRANSFER - OUT REPORT:    Verbal report given to Sergio Lee RN on Craig Born  being transferred to  523 for routine progression of patient care       Report consisted of patient's Situation, Background, Assessment and   Recommendations(SBAR). Information from the following report(s) ED SBAR was reviewed with the receiving nurse. Lines:   Peripheral IV 10/26/22 Right Hand (Active)   Site Assessment Clean, dry & intact 10/26/22 1353   Line Status Blood return noted; Flushed 10/26/22 1353   Phlebitis Assessment No symptoms 10/26/22 1353   Infiltration Assessment 0 10/26/22 1353        Opportunity for questions and clarification was provided.       Patient transported with:  Hardik Arcos RN  10/26/22 8843

## 2022-10-26 NOTE — PROGRESS NOTES
Pt's sister Corry Diaz asked for medical team to discuss with her in regards to any imaging or workup or treatment that pt refuses as pt is not aware and able to make decision due to her dementia and will \"say no to everything. \"    Lan Evans, DO

## 2022-10-26 NOTE — H&P
Hospitalist History and Physical   Admit Date:  10/26/2022  1:37 PM   Name:  Liat Carolina   Age:  67 y.o. Sex:  female  :  1950   MRN:  [id number]   Room:  ER30/30    Presenting Complaint: Vaginal Bleeding     Reason(s) for Admission: LACY (acute kidney injury) ([facility] [N17.9]     History of Present Illness:   Liat Carolina is a 67 y.o. female with medical history of PAF on Eliquis, dementia who presented from [facility] with concerns for bleeding in her diaper, unsure if vaginal bleeding or hematuria of 1 month duration. HPI limited due to patient's history of dementia. Sister at bedside stated that staff at the facility noted that she had dark blood in her diaper. She has a history of uterine mass that she was told could be fibroids diagnosed about 4 years ago, saw GYN but sister unsure of workup. Has not been following up with GYN. She takes Eliquis for A. fib but this has been held at the facility due to concerns for the bleeding but it persisted so EMS was called. Patient denies fever, chills, SOB, chest pain, abdominal pain, nausea, vomiting    Of note, patient was recently admitted 10/7-10/13 for for sepsis due to UTI, urinary retention and A. fib with RVR. In ED, VSS. Hgb 14.6. Cr 1.7 (baseline <1). Na 151. UA c/w UTI. Per ED provider, no vaginal bleeding on exam. Pt was given Rocephin in ED. Review of Systems:  10 systems reviewed and negative except as noted in HPI. Assessment & Plan: LACY   Cr 1.7 on admission (baseline <1 though Cr ~1.5 Oct 2022), BUN 53. Most likely d/t decrease po intake and dehydration in setting of UTI  Avoid nephrotoxic meds  Accurate I&O's  Holding home diuretics/ACE-I/ARB  MIVF  Obtain CTAP to r/o obstruction    SIRS  Acute UTI with hematuria  WBC 11.6 on admission. Did not meet criteria for sepsis. Hgb wnl at 14.9 on admission. Holding home Eliquis until hematuria improves  Cont home Flomax  Abx: Rocephin (10/26-. ..) empirically pending UCx  Ucx: pending  Obtain BCX/LA    Hypernatremia  Na 151 on admission. Most likely d/t dehydration  mIVF with D5W  F/u BMP    Vaginal bleeding  Concerns from nursing facility but not evidence in ED or on admission. ? Bleeding from hematuria and not vaginal. Hgb wnl at 14.9 on admission. Hold home Eliquis for now  Obtain pelvic ultrasound  Monitor CBC    LLE edema  Obtain venous duplex to r/o DVT since pt has been off of Eliquis    PAF  Monitor on telemetry  Continue home metoprolol  Holding home Eliquis in setting of hematuria    DMII  Hold home Januvia and glyburide-Metformin  Start SSI  Check hA1C  Consult diabetes management    Hypothyroidism  Continue home Synthroid    Dementia  Continue home Namenda      Anticipated discharge needs:     >2 midnights    Diet: No diet orders on file  VTE ppx: SCD's  Code status: Prior    Hospital Problems:  Principal Problem:    LACY (acute kidney injury) ([address]  Resolved Problems:    * No resolved hospital problems.  *       Past History:     Past Medical History:   Diagnosis Date    Atrial fibrillation ([facility]     Colon cancer ([facility] 1999    Dementia ([facility]     with memory loss    Diabetes ([facility]     Menopause     Thyroid disease     Uterine mass        Past Surgical History:   Procedure Laterality Date    TOTAL COLECTOMY  1999        Social History     Tobacco Use    Smoking status: Never    Smokeless tobacco: Never   Substance Use Topics    Alcohol use: No      Social History     Substance and Sexual Activity   Drug Use No       Family History   Problem Relation Age of Onset    Diabetes Mother     Diabetes Father         Immunization History   Administered Date(s) Administered    COVID-19, MODERNA BLUE border, Primary or Immunocompromised, (age 12y+), IM, 100 mcg/0.5mL 01/13/2021, 02/10/2021, 12/20/2021    Influenza Virus Vaccine 09/26/2007, 09/26/2011, 09/10/2020    Influenza, FLUARIX, FLULAVAL, FLUZONE (age 10 mo+) AND AFLURIA, (age 1 y+), PF, 0.5mL 10/11/2018, 10/29/2019    PPD Test 08/12/2022, 10/08/2022    Pneumococcal Conjugate 13-valent ([id number]) 01/23/2019    Pneumococcal Polysaccharide ([id number]) 02/12/2015    Tdap (Boostrix, Adacel) 02/12/2015    Zoster Recombinant (Shingrix) 06/23/2019, 09/24/2019     No Known Allergies  Prior to Admit Medications:  Current Outpatient Medications   Medication Instructions    acetaminophen (TYLENOL) 650 mg, Oral, EVERY 4 HOURS PRN    apixaban (ELIQUIS) 5 mg, Oral, 2 TIMES DAILY    ascorbic acid (VITAMIN C) 250 mg, Oral, DAILY    atorvastatin (LIPITOR) 40 MG tablet TAKE 1 TABLET DAILY    FreeStyle Lancets MISC USE TO CHECK BLOOD GLUCOSE ONCE DAILY    FREESTYLE LITE strip USE TO CHECK BLOOD GLUCOSE ONCE DAILY    glyBURIDE-metFORMIN (GLUCOVANCE) 2.5-500 MG per tablet TAKE 2 TABLETS TWICE A DAY WITH MEALS    levothyroxine (SYNTHROID) 75 MCG tablet TAKE 1 TABLET DAILY BEFORE BREAKFAST    memantine (NAMENDA) 10 MG tablet TAKE 1 TABLET TWICE A DAY    metoprolol succinate (TOPROL XL) 50 mg, Oral, DAILY    nystatin (MYCOSTATIN) 784791 UNIT/GM powder Apply powder to skin folds between breasts and abdomen 4 times daily    nystatin (MYCOSTATIN) 500,000 Units, Oral, 4 TIMES DAILY    SITagliptin (JANUVIA) 100 mg, Oral, DAILY    tamsulosin (FLOMAX) 0.4 mg, Oral, DAILY         Objective:   Patient Vitals for the past 24 hrs:   Temp Pulse Resp BP SpO2   10/26/22 1340 98.4 °F (36.9 °C) 85 16 (!) 118/94 95 %       Oxygen Therapy  SpO2: 95 %  O2 Device: None (Room air)    Estimated body mass index is 34.97 kg/m² as calculated from the following:    Height as of this encounter: 5' 8\" (1.727 m). Weight as of this encounter: 230 lb (104.3 kg). No intake or output data in the 24 hours ending 10/26/22 1601      Physical Exam:    Blood pressure (!) 118/94, pulse 85, temperature 98.4 °F (36.9 °C), temperature source Oral, resp. rate 16, height 5' 8\" (1.727 m), weight 230 lb (104.3 kg), SpO2 95 %. General:    Chronically ill appearing.  Very little interaction. Head:  Normocephalic, atraumatic  Eyes:  Sclerae appear normal.  Pupils equally round. ENT:  Nares appear normal, no drainage. Moist oral mucosa  Neck:  No restricted ROM. Trachea midline   CV:   RRR. No m/r/g. No jugular venous distension. Lungs:   CTAB. No wheezing, rhonchi, or rales. Symmetric expansion. Abdomen: Bowel sounds present. Soft, nontender, nondistended. :  Deferred  Extremities: No cyanosis or clubbing. No edema  Skin:     No rashes and normal coloration. Warm and dry. Neuro:  CN II-XII grossly intact. Alert and awake, oriented x3.  Follows commands  Psych:  Flat affect     I have personally reviewed labs and tests showing:  Recent Labs:  Recent Results (from the past 24 hour(s))   CBC with Auto Differential    Collection Time: 10/26/22  1:54 PM   Result Value Ref Range    WBC 11.6 (H) 4.3 - 11.1 K/uL    RBC 5.07 4.05 - 5.2 M/uL    Hemoglobin 14.9 11.7 - 15.4 g/dL    Hematocrit 48.8 (H) 35.8 - 46.3 %    MCV 96.3 82 - 102 FL    MCH 29.4 26.1 - 32.9 PG    MCHC 30.5 (L) 31.4 - 35.0 g/dL    RDW 19.1 (H) 11.9 - 14.6 %    Platelets 534 909 - 007 K/uL    MPV 10.2 9.4 - 12.3 FL    nRBC 0.03 0.0 - 0.2 K/uL    Differential Type AUTOMATED      Seg Neutrophils 65 43 - 78 %    Lymphocytes 24 13 - 44 %    Monocytes 8 4.0 - 12.0 %    Eosinophils % 1 0.5 - 7.8 %    Basophils 1 0.0 - 2.0 %    Immature Granulocytes 2 0.0 - 5.0 %    Segs Absolute 7.5 1.7 - 8.2 K/UL    Absolute Lymph # 2.8 0.5 - 4.6 K/UL    Absolute Mono # 0.9 0.1 - 1.3 K/UL    Absolute Eos # 0.2 0.0 - 0.8 K/UL    Basophils Absolute 0.1 0.0 - 0.2 K/UL    Absolute Immature Granulocyte 0.2 0.0 - 0.5 K/UL   CMP    Collection Time: 10/26/22  1:54 PM   Result Value Ref Range    Sodium 151 (H) 133 - 143 mmol/L    Potassium 4.0 3.5 - 5.1 mmol/L    Chloride 123 (H) 101 - 110 mmol/L    CO2 23 21 - 32 mmol/L    Anion Gap 5 2 - 11 mmol/L    Glucose 94 65 - 100 mg/dL    BUN 53 (H) 8 - 23 MG/DL    Creatinine 1.70 (H) 0.6 - 1.0 MG/DL Est, Glom Filt Rate 31 (L) >60 ml/min/1.73m2    Calcium 9.2 8.3 - 10.4 MG/DL    Total Bilirubin 0.6 0.2 - 1.1 MG/DL    ALT 31 12 - 65 U/L    AST 40 (H) 15 - 37 U/L    Alk Phosphatase 82 50 - 136 U/L    Total Protein 6.8 6.3 - 8.2 g/dL    Albumin 3.3 3.2 - 4.6 g/dL    Globulin 3.5 2.8 - 4.5 g/dL    Albumin/Globulin Ratio 0.9 0.4 - 1.6     Protime-INR    Collection Time: 10/26/22  1:54 PM   Result Value Ref Range    Protime 15.1 (H) 12.6 - 14.3 sec    INR 1.1     Urinalysis w rflx microscopic    Collection Time: 10/26/22  2:29 PM   Result Value Ref Range    Color, UA BROWN      Appearance CLOUDY      Specific Gravity, UA 1.025 (H) 1.001 - 1.023      pH, Urine 6.5 5.0 - 9.0      Protein,  (A) NEG mg/dL    Glucose, UA Negative NEG mg/dL    Ketones, Urine TRACE (A) NEG mg/dL    Bilirubin Urine MODERATE (A) NEG      Blood, Urine LARGE (A) NEG      Urobilinogen, Urine 1.0 0.2 - 1.0 EU/dL    Nitrite, Urine Positive (A) NEG      Leukocyte Esterase, Urine MODERATE (A) NEG     Urinalysis, Micro    Collection Time: 10/26/22  2:29 PM   Result Value Ref Range    WBC, UA  0 /hpf    RBC, UA >100 0 /hpf    Epithelial Cells UA 0 0 /hpf    BACTERIA, URINE 4+ (H) 0 /hpf    Casts 0 0 /lpf    Crystals 0 0 /LPF    Mucus, UA 0 0 /lpf    OTHER OBSERVATIONS RESULTS VERIFIED MANUALLY         I have personally reviewed imaging studies showing:  No results found. Echocardiogram:  10/07/22    TRANSTHORACIC ECHOCARDIOGRAM (TTE) COMPLETE (CONTRAST/BUBBLE/3D PRN) 10/09/2022  8:03 AM (Final)    Interpretation Summary    Left Ventricle: Normal left ventricular systolic function. Left ventricle size is normal. Mildly increased wall thickness. Normal wall motion. Mitral Valve: Mild regurgitation. Left Atrium: Left atrium is mildly dilated. Technical qualifiers: Technically difficult study, color flow Doppler was performed and pulse wave and/or continuous wave Doppler was performed. Contrast used: Definity.     Signed by: Martell Duane, MD on 10/9/2022  8:03 AM        Orders Placed This Encounter   Medications    DISCONTD: 0.9 % sodium chloride bolus    cefTRIAXone (ROCEPHIN) 1,000 mg in sodium chloride 0.9 % 50 mL IVPB mini-bag     Order Specific Question:   Antimicrobial Indications     Answer:   Urinary Tract Infection    DISCONTD: 0.9 % sodium chloride bolus         Signed: Kit Dryer, DO    Part of this note may have been written by using a voice dictation software. The note has been proof read but may still contain some grammatical/other typographical errors.

## 2022-10-26 NOTE — ED TRIAGE NOTES
Pt from [facility] with complaint of vaginal bleeding, unsure if hematuria (-) injury/trauma to area, LOC.  Pt has been off eliquis for a week   A&Ox4

## 2022-10-26 NOTE — ACP (ADVANCE CARE PLANNING)
[facility]  At the heart of better care     Advance Care Planning   Admit Date:  10/26/2022  1:37 PM   Name:  Merary Ribeiro   Age:  67 y.o. Sex:  female  :  1950   MRN:  [id number]   Room:  ER30/30    Merary Ribeiro is able to make her own decisions:   Basic decision yes, but complex medical decision, no    If pt unable to make decisions, POA/surrogate decision maker:  Sister/POA Dorothy Roman    Pt at bedside alert and oriented x3 but not participating much in discussion. Sister Pedro Luis Boudreaux at bedside states that she has dementia and this will change anytime and she could not make medical decision. Discussing code status and sister at bedside stated pt would want to be FULL code. Patient or surrogate consented to discussion of the current conditions, workup, management plans, prognosis, and the risk for further deterioration. Time spent: 17 minutes in direct discussion. Signed:   Kath Sheets DO

## 2022-10-26 NOTE — ED NOTES
Per MD Kayla Orellana complete one IVF bolus 1000ml, wrong order discontinued. IVF infusing on pump at this time.       Rosa Rubi RN  10/26/22 [id number]

## 2022-10-27 ENCOUNTER — APPOINTMENT (OUTPATIENT)
Dept: ULTRASOUND IMAGING | Age: 72
DRG: 683 | End: 2022-10-27
Payer: MEDICARE

## 2022-10-27 LAB
ANION GAP SERPL CALC-SCNC: 2 MMOL/L (ref 2–11)
BASOPHILS # BLD: 0.1 K/UL (ref 0–0.2)
BASOPHILS NFR BLD: 1 % (ref 0–2)
BUN SERPL-MCNC: 43 MG/DL (ref 8–23)
CALCIUM SERPL-MCNC: 9 MG/DL (ref 8.3–10.4)
CHLORIDE SERPL-SCNC: 120 MMOL/L (ref 101–110)
CO2 SERPL-SCNC: 28 MMOL/L (ref 21–32)
CREAT SERPL-MCNC: 1.4 MG/DL (ref 0.6–1)
DIFFERENTIAL METHOD BLD: ABNORMAL
EOSINOPHIL # BLD: 0.2 K/UL (ref 0–0.8)
EOSINOPHIL NFR BLD: 3 % (ref 0.5–7.8)
ERYTHROCYTE [DISTWIDTH] IN BLOOD BY AUTOMATED COUNT: 18.6 % (ref 11.9–14.6)
GLUCOSE BLD STRIP.AUTO-MCNC: 120 MG/DL (ref 65–100)
GLUCOSE BLD STRIP.AUTO-MCNC: 135 MG/DL (ref 65–100)
GLUCOSE BLD STRIP.AUTO-MCNC: 160 MG/DL (ref 65–100)
GLUCOSE BLD STRIP.AUTO-MCNC: 199 MG/DL (ref 65–100)
GLUCOSE SERPL-MCNC: 148 MG/DL (ref 65–100)
HCT VFR BLD AUTO: 49.8 % (ref 35.8–46.3)
HGB BLD-MCNC: 14.8 G/DL (ref 11.7–15.4)
IMM GRANULOCYTES # BLD AUTO: 0.2 K/UL (ref 0–0.5)
IMM GRANULOCYTES NFR BLD AUTO: 2 % (ref 0–5)
LYMPHOCYTES # BLD: 2.2 K/UL (ref 0.5–4.6)
LYMPHOCYTES NFR BLD: 23 % (ref 13–44)
MCH RBC QN AUTO: 29.5 PG (ref 26.1–32.9)
MCHC RBC AUTO-ENTMCNC: 29.7 G/DL (ref 31.4–35)
MCV RBC AUTO: 99.2 FL (ref 82–102)
MM INDURATION, POC: 0 MM (ref 0–5)
MONOCYTES # BLD: 0.6 K/UL (ref 0.1–1.3)
MONOCYTES NFR BLD: 7 % (ref 4–12)
NEUTS SEG # BLD: 6 K/UL (ref 1.7–8.2)
NEUTS SEG NFR BLD: 65 % (ref 43–78)
NRBC # BLD: 0.02 K/UL (ref 0–0.2)
PLATELET # BLD AUTO: 237 K/UL (ref 150–450)
PMV BLD AUTO: 10.4 FL (ref 9.4–12.3)
POTASSIUM SERPL-SCNC: 3.9 MMOL/L (ref 3.5–5.1)
PPD, POC: NEGATIVE
RBC # BLD AUTO: 5.02 M/UL (ref 4.05–5.2)
SERVICE CMNT-IMP: ABNORMAL
SODIUM SERPL-SCNC: 150 MMOL/L (ref 133–143)
WBC # BLD AUTO: 9.2 K/UL (ref 4.3–11.1)

## 2022-10-27 PROCEDURE — 80048 BASIC METABOLIC PNL TOTAL CA: CPT

## 2022-10-27 PROCEDURE — 97530 THERAPEUTIC ACTIVITIES: CPT

## 2022-10-27 PROCEDURE — 76937 US GUIDE VASCULAR ACCESS: CPT

## 2022-10-27 PROCEDURE — 76856 US EXAM PELVIC COMPLETE: CPT

## 2022-10-27 PROCEDURE — 1100000003 HC PRIVATE W/ TELEMETRY

## 2022-10-27 PROCEDURE — 97166 OT EVAL MOD COMPLEX 45 MIN: CPT

## 2022-10-27 PROCEDURE — 6370000000 HC RX 637 (ALT 250 FOR IP): Performed by: FAMILY MEDICINE

## 2022-10-27 PROCEDURE — 97162 PT EVAL MOD COMPLEX 30 MIN: CPT

## 2022-10-27 PROCEDURE — 82962 GLUCOSE BLOOD TEST: CPT

## 2022-10-27 PROCEDURE — 2580000003 HC RX 258: Performed by: FAMILY MEDICINE

## 2022-10-27 PROCEDURE — 85025 COMPLETE CBC W/AUTO DIFF WBC: CPT

## 2022-10-27 PROCEDURE — 97112 NEUROMUSCULAR REEDUCATION: CPT

## 2022-10-27 PROCEDURE — 6370000000 HC RX 637 (ALT 250 FOR IP): Performed by: INTERNAL MEDICINE

## 2022-10-27 RX ORDER — CEFDINIR 300 MG/1
300 CAPSULE ORAL 2 TIMES DAILY
Status: DISCONTINUED | OUTPATIENT
Start: 2022-10-27 | End: 2022-10-28 | Stop reason: HOSPADM

## 2022-10-27 RX ADMIN — FAMOTIDINE 20 MG: 20 TABLET, FILM COATED ORAL at 08:27

## 2022-10-27 RX ADMIN — OXYCODONE HYDROCHLORIDE AND ACETAMINOPHEN 250 MG: 500 TABLET ORAL at 08:27

## 2022-10-27 RX ADMIN — DEXTROSE MONOHYDRATE: 5 INJECTION, SOLUTION INTRAVENOUS at 09:57

## 2022-10-27 RX ADMIN — Medication 250 MG: at 20:35

## 2022-10-27 RX ADMIN — METOPROLOL SUCCINATE 50 MG: 50 TABLET, EXTENDED RELEASE ORAL at 08:27

## 2022-10-27 RX ADMIN — CEFDINIR 300 MG: 300 CAPSULE ORAL at 20:35

## 2022-10-27 RX ADMIN — TAMSULOSIN HYDROCHLORIDE 0.4 MG: 0.4 CAPSULE ORAL at 08:27

## 2022-10-27 RX ADMIN — ATORVASTATIN CALCIUM 40 MG: 40 TABLET, FILM COATED ORAL at 08:27

## 2022-10-27 RX ADMIN — MEMANTINE 10 MG: 5 TABLET ORAL at 08:26

## 2022-10-27 RX ADMIN — MEMANTINE 10 MG: 5 TABLET ORAL at 20:35

## 2022-10-27 RX ADMIN — Medication 250 MG: at 08:27

## 2022-10-27 RX ADMIN — SODIUM CHLORIDE, PRESERVATIVE FREE 10 ML: 5 INJECTION INTRAVENOUS at 08:29

## 2022-10-27 RX ADMIN — LEVOTHYROXINE SODIUM 75 MCG: 0.07 TABLET ORAL at 07:31

## 2022-10-27 RX ADMIN — APIXABAN 5 MG: 5 TABLET, FILM COATED ORAL at 20:35

## 2022-10-27 ASSESSMENT — PAIN SCALES - GENERAL
PAINLEVEL_OUTOF10: 0

## 2022-10-27 NOTE — PROGRESS NOTES
ACUTE PHYSICAL THERAPY GOALS:   (Developed with and agreed upon by patient and/or caregiver.)    (1.) Navi Harp  will move from supine to sit and sit to supine  with MINIMAL ASSIST within 7 treatment day(s). (2.) Navi Harp will transfer from bed to chair and chair to bed with MODERATE ASSIST using the least restrictive device within 7 treatment day(s). (3.) Navi Harp will ambulate with MAXIMAL ASSIST for 10 feet with the least restrictive device within 7 treatment day(s). (4.) Navi Harp will perform seated static and dynamic balance activities x 10 minutes with SUPERVISION to improve safety within 7 treatment day(s). (5.) Navi Harp will perform bilateral lower extremity exercises x 10 min for HEP with MINIMAL ASSIST to improve strength, endurance, and functional mobility within 7 treatment day(s). PHYSICAL THERAPY Initial Assessment, Daily Note, and AM  (Link to Caseload Tracking: PT Visit Days : 1  Acknowledge Orders  Time In/Out  PT Charge Capture  Rehab Caseload Tracker    Navi Harp is a 67 y.o. female   PRIMARY DIAGNOSIS: LACY (acute kidney injury) ([address]  Lower extremity edema [R60.0]  LACY (acute kidney injury) ([address] [N17.9]  Acute kidney injury ([address] [N17.9]  Acute cystitis with hematuria [N30.01]       Reason for Referral: Other abnormalities of gait and mobility (R26.89)  Inpatient: Payor: MEDICARE / Plan: MEDICARE PART A AND B / Product Type: *No Product type* /     ASSESSMENT:     REHAB RECOMMENDATIONS:   Recommendation to date pending progress:  Setting:  Short-term Rehab  Vs return to memory care    Equipment:    To Be Determined     ASSESSMENT:  Ms. Branden Ascencio  is a 67year old F who presents with LACY. PLOF unclear due to pt's dementia, however, was admitted from memory care per chart. Pt presents today calm, oriented to self, location, and year. Frequent confusion noted throughout session.  This date pt performs mobility including bed mobility with maxA of 2 and extended time and cueing. Once upright, pt able to maintain sitting balance with SBA. STS attempted with maxA of 2, however, pt unable to clear hips from bed mainly due to lack of initiation. Pt returned to supine with maxA of 2, positioning for comfort. Given recent hospital admissions and depending on how far pt is from baseline level of mobility pt may benefit from STR at d/c. Pt will benefit from skilled therapy services to address stated deficits to promote return to highest level of function, independence, and safety. Will continue to follow.      [address] [facility] [address] [address] AM-PAC 6 Clicks Basic Mobility Inpatient Short Form  AM-PAC Mobility Inpatient   How much difficulty turning over in bed?: A Lot  How much difficulty sitting down on / standing up from a chair with arms?: Unable  How much difficulty moving from lying on back to sitting on side of bed?: A Lot  How much help from another person moving to and from a bed to a chair?: Total  How much help from another person needed to walk in hospital room?: Total  How much help from another person for climbing 3-5 steps with a railing?: Total  AM-PAC Inpatient Mobility Raw Score : 8  AM-PAC Inpatient T-Scale Score : 28.52  Mobility Inpatient CMS 0-100% Score: 86.62  Mobility Inpatient CMS G-Code Modifier : CM    SUBJECTIVE:   Ms. Kaley Sexton states, \"thank you very much\"     Social/Functional Lives With: Alone, Other (comment) ([address] [facility] [address] [facility])  Type of Home: Assisted living  Home Layout: One level  Bathroom Toilet: Handicap height  Home Equipment: Nayeli Dunaway, beatrice, Wheelchair-manual  Receives Help From: Family  ADL Assistance: Needs assistance  Bath: Maximum assistance  Dressing: Maximum assistance  Grooming: Maximum assistance  Feeding: Maximum assistance  Toileting: Needs assistance  Ambulation Assistance: Needs assistance  Transfer Assistance: Needs assistance  Active : No  Occupation: Retired    OBJECTIVE:     PAIN: Michelle Bravo / Brian Davess: Rex Sung / Tosha Yun / DRAINS:   Pre Treatment:   Pain Assessment: None - Denies Pain      Post Treatment: 0 Vitals        Oxygen      Purewick    RESTRICTIONS/PRECAUTIONS:  Restrictions/Precautions: Fall Risk                 GROSS EVALUATION: B LE Intact Impaired (Comments):   AROM []  Not formally assessed due to cognition   PROM [x]    Strength []  Not formally assessed due to cognition, appears generally very weak   Balance [] Posture: Good  Sitting - Static: Fair, +  Sitting - Dynamic: Fair   Posture [] N/A   Sensation []     Coordination []      Tone []  tremulous   Edema []    Activity Tolerance [] Treatment limited secondary to decreased cognition    []      COGNITION/  PERCEPTION: Intact Impaired (Comments):   Orientation [] Oriented to place, Oriented to time, Disoriented to person, Disoriented to time   Vision []     Hearing []     Cognition  [] Overall Cognitive Status: Exceptions  Arousal/Alertness: Appropriate responses to stimuli  Following Commands: Inconsistently follows commands  Attention Span: Attends with cues to redirect  Insights: Decreased awareness of deficits  Initiation: Requires cues for some  Sequencing: Requires cues for some     MOBILITY: I Mod I S SBA CGA Min Mod Max Total  NT x2 Comments:   Bed Mobility    Rolling [] [] [] [] [] [] [] [x] [] [] [x]    Supine to Sit [] [] [] [] [] [] [] [x] [] [] [x]    Scooting [] [] [] [] [] [] [] [x] [] [] [x]    Sit to Supine [] [] [] [] [] [] [] [x] [] [] [x]    Transfers    Sit to Stand [] [] [] [] [] [] [] [] [] [x] [] attempted   Bed to Chair [] [] [] [] [] [] [] [] [] [x] []    Stand to Sit [] [] [] [] [] [] [] [] [] [x] []     [] [] [] [] [] [] [] [] [] [] []    I=Independent, Mod I=Modified Independent, S=Supervision, SBA=Standby Assistance, CGA=Contact Guard Assistance,   Min=Minimal Assistance, Mod=Moderate Assistance, Max=Maximal Assistance, Total=Total Assistance, NT=Not Tested    GAIT: I Mod I S SBA CGA Min Mod Max Total  NT x2 Comments:   Level of Assistance [] [] [] [] [] [] [] [] [] [x] []    Distance   N/a    DME N/A    Gait Quality N/A    Weightbearing Status Restrictions/Precautions  Restrictions/Precautions: Fall Risk    Stairs      I=Independent, Mod I=Modified Independent, S=Supervision, SBA=Standby Assistance, CGA=Contact Guard Assistance,   Min=Minimal Assistance, Mod=Moderate Assistance, Max=Maximal Assistance, Total=Total Assistance, NT=Not Tested    PLAN:   FREQUENCY AND DURATION: 3 times/week for duration of hospital stay or until stated goals are met, whichever comes first.    THERAPY PROGNOSIS: Fair    PROBLEM LIST:   (Skilled intervention is medically necessary to address:)  Decreased ADL/Functional Activities  Decreased Activity Tolerance  Decreased Balance  Decreased Cognition  Decreased Gait Ability  Decreased Safety Awareness  Decreased Strength  Decreased Transfer Abilities INTERVENTIONS PLANNED:   (Benefits and precautions of physical therapy have been discussed with the patient.)  Self Care Training  Therapeutic Activity  Therapeutic Exercise/HEP  Neuromuscular Re-education  Gait Training  Education       TREATMENT:   EVALUATION: MODERATE COMPLEXITY: (Untimed Charge)    TREATMENT:   Co-Treatment PT/OT necessary due to patient's decreased overall endurance/tolerance levels, as well as need for high level skilled assistance to complete functional transfers/mobility and functional tasks  Therapeutic Activity (9 Minutes): Therapeutic activity included Rolling, Supine to Sit, Sit to Supine, Scooting, and Sitting balance  to improve functional Activity tolerance, Balance, Mobility, and Strength. TREATMENT GRID:  N/A    AFTER TREATMENT PRECAUTIONS: Bed, Bed/Chair Locked, Needs within reach, RN notified, and transport present to take pt to [address]:  RN/ PCT and OT/ JAUREGUI    EDUCATION: Education Given To: Patient  Education Provided: Role of Therapy;Plan of Care; Fall Prevention Strategies  Education Method: Verbal  Barriers to Learning: Cognition  Education Outcome: Continued education needed    TIME IN/OUT:  Time In: 1017  Time Out: 1034  Minutes: [address] [facility] ANJANA LINO

## 2022-10-27 NOTE — DIABETES MGMT
Patient admitted with LACY. Admit blood glucose 94. HgbA1C 5.7 (). Blood glucose ranged  yesterday with patient receiving no diabetes medications. Blood glucose this morning was 120. Creatinine 1.40. GFR 40. Reviewed patient current regimen: Humalog correctional insulin. Glycemic control overall stable on current regimen. Given age and co-morbidities risk of hypoglycemic outweighs benefit of strict glycemic control. Noted per PTA med list patient on Glyburide-Metformin 2.5-500 mg 2 tablets BID. Given HgbA1C and previous hypoglycemic event patient would likely benefit from discontinuation or lower dose of sulfonylurea component of combinations drug. Provider update via [employer] regarding recommendation and glycemic control.

## 2022-10-27 NOTE — PROGRESS NOTES
Hospitalist Progress Note   Admit Date:  10/26/2022  1:37 PM   Name:  Yolette Dickson   Age:  67 y.o. Sex:  female  :  1950   MRN:  [id number]   Room:  3/01    Presenting Complaint: Vaginal Bleeding     Reason(s) for Admission: Lower extremity edema [R60.0]  LACY (acute kidney injury) ([facility] [N17.9]  Acute kidney injury ([address] [N17.9]  Acute cystitis with hematuria [N30.01]     Hospital Course:   Yolette Dickson is a 67 y.o. female with medical history of PAF on Eliquis, dementia who presented from [facility] facility with concerns for bleeding in her diaper, unsure if vaginal bleeding or hematuria of 1 month duration. HPI limited due to patient's history of dementia. Sister at bedside stated that staff at the facility noted that she had dark blood in her diaper. She has a history of uterine mass that she was told could be fibroids diagnosed about 4 years ago, saw GYN but sister unsure of workup. Has not been following up with GYN. She takes Eliquis for A. fib but this has been held at the facility due to concerns for the bleeding but it persisted so EMS was called. Patient denies fever, chills, SOB, chest pain, abdominal pain, nausea, vomiting     Of note, patient was recently admitted 10/7-10/13 for for sepsis due to UTI, urinary retention and A. fib with RVR. In ED, VSS. Hgb 14.6. Cr 1.7 (baseline <1). Na 151. UA c/w UTI. Per ED provider, no vaginal bleeding on exam. Pt was given Rocephin in ED. Subjective & 24hr Events (10/27/22): Patient doing well. No acute process noted on imaging overnight. He hg is wnl. Assessment & Plan:     Principal Problem:    LACY (acute kidney injury) ([address]  Plan: improving, will continue iv fluids  Active Problems:    Hypernatremia  Plan: Resolving, will continue iv fluids    SIRS (systemic inflammatory response syndrome) ([address]  Plan: resolving, will continue iv abx    Hematuria  Plan: Due to her UTI. Hg stable    Acute UTI  Plan: will continue iv abx. Urine ctx negative    Vaginal bleeding  Plan: No evidence of active bleeding. Hg stable. No increase in size of vaginal fibrioid    Lower extremity edema  Plan: US negative for DVT    Dementia without behavioral disturbance (HCC)  Plan: mood stable, will continue Namenda    Type 2 diabetes with nephropathy ([facility]  Plan: fsbs wnl. Will continue prn csi    Atrial fibrillation (HCC)  Plan: rate controlled. Will restart Eliquis    Acquired hypothyroidism  Plan: synthroid    Hypercholesterolemia  Plan: statin    Obesity (BMI 30.0-34. 9)  Plan:  complicates care, lifestyle modifications encouraged        Anticipated discharge needs:      PT recommending SNF v return to Memory Care    Diet:  ADULT DIET; Regular; 3 carb choices (45 gm/meal)  DVT PPx: Elqius  Code status: Full Code    Hospital Problems:  Principal Problem:    LACY (acute kidney injury) ([facility]  Active Problems:    Hypernatremia    SIRS (systemic inflammatory response syndrome) (HCC)    Hematuria    Acute UTI    Vaginal bleeding    Lower extremity edema    Dementia without behavioral disturbance (HCC)    Type 2 diabetes with nephropathy (HCC)    Atrial fibrillation (HCC)    Acquired hypothyroidism    Hypercholesterolemia    Obesity (BMI 30.0-34. 9)  Resolved Problems:    * No resolved hospital problems. *      Objective:   Patient Vitals for the past 24 hrs:   Temp Pulse Resp BP SpO2   10/27/22 1430 98.1 °F (36.7 °C) 84 14 113/78 97 %   10/27/22 1142 -- (!) 101 -- -- --   10/27/22 0745 97.4 °F (36.3 °C) (!) 107 16 (!) 125/100 97 %   10/27/22 0247 97.3 °F (36.3 °C) 87 18 123/82 98 %   10/26/22 2314 97.7 °F (36.5 °C) 89 17 125/67 97 %   10/26/22 1934 98.3 °F (36.8 °C) (!) 113 17 117/75 98 %   10/26/22 1825 97.7 °F (36.5 °C) 78 18 (!) 140/67 96 %   10/26/22 1751 98 °F (36.7 °C) -- -- -- --   10/26/22 1746 -- -- -- (!) 133/94 96 %   10/26/22 1715 -- 88 18 (!) 138/90 97 %       Oxygen Therapy  SpO2: 97 %  Pulse Oximetry Type:  Intermittent  O2 Device: None (Room air)  Oxygen Therapy: None (Room air)    Estimated body mass index is 34.83 kg/m² as calculated from the following:    Height as of this encounter: 5' 8\" (1.727 m). Weight as of this encounter: 229 lb 1.6 oz (103.9 kg). Intake/Output Summary (Last 24 hours) at 10/27/2022 1701  Last data filed at 10/27/2022 1447  Gross per 24 hour   Intake 680 ml   Output 625 ml   Net 55 ml         Physical Exam:     Blood pressure 113/78, pulse 84, temperature 98.1 °F (36.7 °C), temperature source Oral, resp. rate 14, height 5' 8\" (1.727 m), weight 229 lb 1.6 oz (103.9 kg), SpO2 97 %. General:    Well nourished. Obese  Head:  Normocephalic, atraumatic  Eyes:  Sclerae appear normal.  Pupils equally round. ENT:  Nares appear normal, no drainage. Moist oral mucosa  Neck:  No restricted ROM. Trachea midline   CV:   RRR. No m/r/g. No jugular venous distension. Lungs:   CTAB. No wheezing, rhonchi, or rales. Symmetric expansion. Abdomen: Bowel sounds present. Soft, nontender, nondistended. Extremities: No cyanosis or clubbing. No edema  Skin:     No rashes and normal coloration. Warm and dry. Neuro:  CN II-XII grossly intact. Sensation intact. A&Ox 2-3  Psych:  Normal mood and affect.       I have personally reviewed labs and tests showing:  Recent Labs:  Recent Results (from the past 48 hour(s))   CBC with Auto Differential    Collection Time: 10/26/22  1:54 PM   Result Value Ref Range    WBC 11.6 (H) 4.3 - 11.1 K/uL    RBC 5.07 4.05 - 5.2 M/uL    Hemoglobin 14.9 11.7 - 15.4 g/dL    Hematocrit 48.8 (H) 35.8 - 46.3 %    MCV 96.3 82 - 102 FL    MCH 29.4 26.1 - 32.9 PG    MCHC 30.5 (L) 31.4 - 35.0 g/dL    RDW 19.1 (H) 11.9 - 14.6 %    Platelets 228 763 - 314 K/uL    MPV 10.2 9.4 - 12.3 FL    nRBC 0.03 0.0 - 0.2 K/uL    Differential Type AUTOMATED      Seg Neutrophils 65 43 - 78 %    Lymphocytes 24 13 - 44 %    Monocytes 8 4.0 - 12.0 %    Eosinophils % 1 0.5 - 7.8 %    Basophils 1 0.0 - 2.0 %    Immature Granulocytes 2 0.0 - 5.0 %    Segs Absolute 7.5 1.7 - 8.2 K/UL    Absolute Lymph # 2.8 0.5 - 4.6 K/UL    Absolute Mono # 0.9 0.1 - 1.3 K/UL    Absolute Eos # 0.2 0.0 - 0.8 K/UL    Basophils Absolute 0.1 0.0 - 0.2 K/UL    Absolute Immature Granulocyte 0.2 0.0 - 0.5 K/UL   CMP    Collection Time: 10/26/22  1:54 PM   Result Value Ref Range    Sodium 151 (H) 133 - 143 mmol/L    Potassium 4.0 3.5 - 5.1 mmol/L    Chloride 123 (H) 101 - 110 mmol/L    CO2 23 21 - 32 mmol/L    Anion Gap 5 2 - 11 mmol/L    Glucose 94 65 - 100 mg/dL    BUN 53 (H) 8 - 23 MG/DL    Creatinine 1.70 (H) 0.6 - 1.0 MG/DL    Est, Glom Filt Rate 31 (L) >60 ml/min/1.73m2    Calcium 9.2 8.3 - 10.4 MG/DL    Total Bilirubin 0.6 0.2 - 1.1 MG/DL    ALT 31 12 - 65 U/L    AST 40 (H) 15 - 37 U/L    Alk Phosphatase 82 50 - 136 U/L    Total Protein 6.8 6.3 - 8.2 g/dL    Albumin 3.3 3.2 - 4.6 g/dL    Globulin 3.5 2.8 - 4.5 g/dL    Albumin/Globulin Ratio 0.9 0.4 - 1.6     Protime-INR    Collection Time: 10/26/22  1:54 PM   Result Value Ref Range    Protime 15.1 (H) 12.6 - 14.3 sec    INR 1.1     Hemoglobin A1c    Collection Time: 10/26/22  1:54 PM   Result Value Ref Range    Hemoglobin A1C 5.7 (H) 4.8 - 5.6 %    eAG 117 mg/dL   Urinalysis w rflx microscopic    Collection Time: 10/26/22  2:29 PM   Result Value Ref Range    Color, UA BROWN      Appearance CLOUDY      Specific Gravity, UA 1.025 (H) 1.001 - 1.023      pH, Urine 6.5 5.0 - 9.0      Protein,  (A) NEG mg/dL    Glucose, UA Negative NEG mg/dL    Ketones, Urine TRACE (A) NEG mg/dL    Bilirubin Urine MODERATE (A) NEG      Blood, Urine LARGE (A) NEG      Urobilinogen, Urine 1.0 0.2 - 1.0 EU/dL    Nitrite, Urine Positive (A) NEG      Leukocyte Esterase, Urine MODERATE (A) NEG     Culture, Urine    Collection Time: 10/26/22  2:29 PM    Specimen: Urine    CATH URINE   Result Value Ref Range    Special Requests NO SPECIAL REQUESTS      Culture NO GROWTH 1 DAY     Urinalysis, Micro    Collection Time: 10/26/22 2:29 PM   Result Value Ref Range    WBC, UA  0 /hpf    RBC, UA >100 0 /hpf    Epithelial Cells UA 0 0 /hpf    BACTERIA, URINE 4+ (H) 0 /hpf    Casts 0 0 /lpf    Crystals 0 0 /LPF    Mucus, UA 0 0 /lpf    OTHER OBSERVATIONS RESULTS VERIFIED MANUALLY     Culture, Blood 1    Collection Time: 10/26/22  5:15 PM    Specimen: Blood   Result Value Ref Range    Special Requests LEFT  HAND        Culture NO GROWTH AFTER 12 HOURS     Culture, Blood 1    Collection Time: 10/26/22  5:15 PM    Specimen: Blood   Result Value Ref Range    Special Requests RIGHT  HAND        Culture NO GROWTH AFTER 12 HOURS     Lactic Acid    Collection Time: 10/26/22  5:34 PM   Result Value Ref Range    Lactic Acid, Plasma 1.7 0.4 - 2.0 MMOL/L   POCT Glucose    Collection Time: 10/26/22  6:30 PM   Result Value Ref Range    POC Glucose 72 65 - 100 mg/dL    Performed by: Linden    POCT Glucose    Collection Time: 10/26/22  8:46 PM   Result Value Ref Range    POC Glucose 134 (H) 65 - 100 mg/dL    Performed by: Rai    POCT Glucose    Collection Time: 10/27/22  8:01 AM   Result Value Ref Range    POC Glucose 120 (H) 65 - 100 mg/dL    Performed by: Racheal Smithmann    Basic Metabolic Panel w/ Reflex to MG    Collection Time: 10/27/22  8:22 AM   Result Value Ref Range    Sodium 150 (H) 133 - 143 mmol/L    Potassium 3.9 3.5 - 5.1 mmol/L    Chloride 120 (H) 101 - 110 mmol/L    CO2 28 21 - 32 mmol/L    Anion Gap 2 2 - 11 mmol/L    Glucose 148 (H) 65 - 100 mg/dL    BUN 43 (H) 8 - 23 MG/DL    Creatinine 1.40 (H) 0.6 - 1.0 MG/DL    Est, Glom Filt Rate 40 (L) >60 ml/min/1.73m2    Calcium 9.0 8.3 - 10.4 MG/DL   CBC with Auto Differential    Collection Time: 10/27/22  8:22 AM   Result Value Ref Range    WBC 9.2 4.3 - 11.1 K/uL    RBC 5.02 4.05 - 5.2 M/uL    Hemoglobin 14.8 11.7 - 15.4 g/dL    Hematocrit 49.8 (H) 35.8 - 46.3 %    MCV 99.2 82 - 102 FL    MCH 29.5 26.1 - 32.9 PG    MCHC 29.7 (L) 31.4 - 35.0 g/dL    RDW 18.6 (H) 11.9 - 14.6 % Platelets 358 653 - 935 K/uL    MPV 10.4 9.4 - 12.3 FL    nRBC 0.02 0.0 - 0.2 K/uL    Differential Type AUTOMATED      Seg Neutrophils 65 43 - 78 %    Lymphocytes 23 13 - 44 %    Monocytes 7 4.0 - 12.0 %    Eosinophils % 3 0.5 - 7.8 %    Basophils 1 0.0 - 2.0 %    Immature Granulocytes 2 0.0 - 5.0 %    Segs Absolute 6.0 1.7 - 8.2 K/UL    Absolute Lymph # 2.2 0.5 - 4.6 K/UL    Absolute Mono # 0.6 0.1 - 1.3 K/UL    Absolute Eos # 0.2 0.0 - 0.8 K/UL    Basophils Absolute 0.1 0.0 - 0.2 K/UL    Absolute Immature Granulocyte 0.2 0.0 - 0.5 K/UL   POCT Glucose    Collection Time: 10/27/22 12:55 PM   Result Value Ref Range    POC Glucose 199 (H) 65 - 100 mg/dL    Performed by: Juju        I have personally reviewed imaging studies showing: Other Studies:  US PELVIS COMPLETE   Final Result   Large mass in the uterus consistent with a fibroid. Endometrium is   obscured. Vascular duplex lower extremity venous bilateral   Final Result   The left peroneal vein is not well visualized. Flow was not documented, and   occlusive DVT is possible. Otherwise no evidence of deep venous thrombosis in the bilateral lower   extremities. CT ABDOMEN PELVIS WO CONTRAST Additional Contrast? Radiologist Recommendation   Final Result   -0.8 cm calculus in the right mid/distal ureter with associated mild upstream   hydroureteronephrosis. Additional 1.2 cm calculus in the right renal pelvis.      -Large heterogeneous mass in the right hemipelvis and lower abdomen likely   arising from uterus, grossly similar to prior exam from 8/11/2022. This could   reflect a large fibroid but  malignancy is also a consideration.                    Current Meds:  Current Facility-Administered Medications   Medication Dose Route Frequency    cefdinir (OMNICEF) capsule 300 mg  300 mg Oral BID    ascorbic acid (VITAMIN C) tablet 250 mg  250 mg Oral Daily    atorvastatin (LIPITOR) tablet 40 mg  40 mg Oral Daily levothyroxine (SYNTHROID) tablet 75 mcg  75 mcg Oral Daily    memantine (NAMENDA) tablet 10 mg  10 mg Oral BID    metoprolol succinate (TOPROL XL) extended release tablet 50 mg  50 mg Oral Daily    tamsulosin (FLOMAX) capsule 0.4 mg  0.4 mg Oral Daily    sodium chloride flush 0.9 % injection 5-40 mL  5-40 mL IntraVENous 2 times per day    sodium chloride flush 0.9 % injection 5-40 mL  5-40 mL IntraVENous PRN    0.9 % sodium chloride infusion   IntraVENous PRN    ondansetron (ZOFRAN-ODT) disintegrating tablet 4 mg  4 mg Oral Q8H PRN    Or    ondansetron (ZOFRAN) injection 4 mg  4 mg IntraVENous Q6H PRN    polyethylene glycol (GLYCOLAX) packet 17 g  17 g Oral Daily PRN    acetaminophen (TYLENOL) tablet 650 mg  650 mg Oral Q6H PRN    Or    acetaminophen (TYLENOL) suppository 650 mg  650 mg Rectal Q6H PRN    tuberculin injection 5 Units  5 Units IntraDERmal Once    glucose chewable tablet 16 g  4 tablet Oral PRN    dextrose bolus 10% 125 mL  125 mL IntraVENous PRN    Or    dextrose bolus 10% 250 mL  250 mL IntraVENous PRN    glucagon (rDNA) injection 1 mg  1 mg SubCUTAneous PRN    dextrose 10 % infusion   IntraVENous Continuous PRN    saccharomyces boulardii (FLORASTOR) capsule 250 mg  250 mg Oral BID    famotidine (PEPCID) tablet 20 mg  20 mg Oral Daily    insulin lispro (HUMALOG) injection vial 0-4 Units  0-4 Units SubCUTAneous TID WC    insulin lispro (HUMALOG) injection vial 0-4 Units  0-4 Units SubCUTAneous Nightly    dextrose 5 % solution   IntraVENous Continuous    [Held by provider] apixaban (ELIQUIS) tablet 5 mg  5 mg Oral BID       Signed:  Jovanni Martin MD    Part of this note may have been written by using a voice dictation software. The note has been proof read but may still contain some grammatical/other typographical errors.

## 2022-10-27 NOTE — PROGRESS NOTES
ACUTE OCCUPATIONAL THERAPY GOALS:   (Developed with and agreed upon by patient and/or caregiver.)  1. Patient will complete upper body bathing and dressing with MIN ASSIST and adaptive equipment as needed. 2. Patient will complete self-grooming tasks in unsupported sitting with SBA and adaptive equipment as needed. 3. Patient will tolerate 25 minutes of OT treatment with 1-2 rest breaks to increase activity tolerance for ADLs. 4. Patient will complete functional transfers with MIN ASSIST and adaptive equipment as needed. 5. Patient will complete functional activity while seated edge of bed with SBA and adaptive equipment as needed. 6. Patient will tolerate 15 minutes unsupported sitting balance with SBA in preparation for ADL performance. 7. Patient will tolerate 10 minutes BUE therapeutic activities to increase use of BUE during ADL performance. Timeframe: 7 visits     OCCUPATIONAL THERAPY Initial Assessment and Daily Note       OT Visit Days: 1  Acknowledge Orders  Time  OT Charge Capture  Rehab Caseload Tracker      Navi Harp is a 67 y.o. female   PRIMARY DIAGNOSIS: LACY (acute kidney injury) ([address]  Lower extremity edema [R60.0]  LACY (acute kidney injury) ([address] [N17.9]  Acute kidney injury ([address] [N17.9]  Acute cystitis with hematuria [N30.01]       Reason for Referral: Generalized Muscle Weakness (M62.81)  Difficulty in walking, Not elsewhere classified (R26.2)  Other abnormalities of gait and mobility (R26.89)  Inpatient: Payor: MEDICARE / Plan: MEDICARE PART A AND B / Product Type: *No Product type* /     ASSESSMENT:     REHAB RECOMMENDATIONS:   Recommendation to date pending progress:  Setting:  Pt poor historian, depending on how far from baseline could benefit from STR    Equipment:    To Be Determined     ASSESSMENT:  Ms. Branden Ascencio is a 66 y/o female who presented this admission for LACY and hematuria. Pt has hx of dementia so unable to get clear understanding of PLOF.  Per chart, pt was admitted from memory care facility. This date, pt calm and oriented to self, location, and year. However, she was noted to have confusion throughout session. Max Ax2 for supine<>sit. Attempted to stand this date, however unable to clear bed with max Ax2. Difficulty with movement noted to be combination of weakness and refusal/difficulty with initiation due to cognitive status. Pt with fair/fair+ sitting balance and able to perform grooming task while seated EOB. Today pt presents with decreased activity tolerance, balance,  and cognition impacting ADLs. Pt appears to be functioning below baseline and would benefit from skilled OT services to address OT goals and plan of care.  Rec STR at d/c     [facility] Daily Activity Inpatient Short Form:    AM-PAC Daily Activity Inpatient   How much help for putting on and taking off regular lower body clothing?: Total  How much help for Bathing?: A Lot  How much help for Toileting?: A Lot  How much help for putting on and taking off regular upper body clothing?: A Lot  How much help for taking care of personal grooming?: A Little  How much help for eating meals?: None  AM-[facility] Inpatient Daily Activity Raw Score: 14  AM-PAC Inpatient ADL T-Scale Score : 33.39  ADL Inpatient CMS 0-100% Score: 59.67  ADL Inpatient CMS G-Code Modifier : CK           SUBJECTIVE:     Ms. Kel Morales states, \"that feels so good\" referring to lotion being put on her back (very dry skin)     Social/Functional Lives With: Other (comment) (memory care facility) - poor historian    OBJECTIVE:     Hermes Washington / Migdalia Leonardo / Verner Clan: Gerard Florez    RESTRICTIONS/PRECAUTIONS:  Restrictions/Precautions: Fall Risk    PAIN: VITALS / O2:   Pre Treatment:    0/10      Post Treatment: 0/10       Vitals          Oxygen            GROSS EVALUATION: INTACT IMPAIRED   (See Comments)   UE AROM [] []Not formally assessed due to cognition   UE PROM [] []   Strength []  Generalized weakness     Posture / Balance [] Posture: Good  Sitting - Static: Fair, +  Sitting - Dynamic: Fair   Sensation []     Coordination []       Tone []       Edema []    Activity Tolerance []  Decreased, limited by fatigue and cognition     Hand Dominance R [] L []      COGNITION/  PERCEPTION: INTACT IMPAIRED   (See Comments)   Orientation []  Oriented to place, time, and person   Vision []     Hearing []     Cognition  []  Decreased awareness and said no a lot   Perception []       MOBILITY: I Mod I S SBA CGA Min Mod Max Total  NT x2 Comments:   Bed Mobility    Rolling [] [] [] [] [] [] [] [] [] [] []    Supine to Sit [] [] [] [] [] [] [] [x] [] [] [x]    Scooting [] [] [] [] [] [] [] [x] [] [] [x]    Sit to Supine [] [] [] [] [] [] [] [x] [] [] [x]    Transfers    Sit to Stand [] [] [] [] [] [] [] [x] [] [] [x] Unsuccessful attempt   Bed to Chair [] [] [] [] [] [] [] [] [] [] []    Stand to Sit [] [] [] [] [] [] [] [] [] [] []    Tub/Shower [] [] [] [] [] [] [] [] [] [] []     Toilet [] [] [] [] [] [] [] [] [] [] []      [] [] [] [] [] [] [] [] [] [] []    I=Independent, Mod I=Modified Independent, S=Supervision/Setup, SBA=Standby Assistance, CGA=Contact Guard Assistance, Min=Minimal Assistance, Mod=Moderate Assistance, Max=Maximal Assistance, Total=Total Assistance, NT=Not Tested    ACTIVITIES OF DAILY LIVING: I Mod I S SBA CGA Min Mod Max Total NT Comments   BASIC ADLs:              Upper Body Bathing  [] [] [] [] [] [] [] [] [] []    Lower Body Bathing [] [] [] [] [] [] [] [] [] []    Toileting [] [] [] [] [] [] [] [] [] []    Upper Body Dressing [] [] [] [] [] [] [] [] [] []    Lower Body Dressing [] [] [] [] [] [] [] [] [] []    Feeding [] [] [] [] [] [] [] [] [] []    Grooming [] [] [] [] [] [x] [] [] [] [] Wash face EOB, assistance for initiating   Personal Device Care [] [] [] [] [] [] [] [] [] []    Functional Mobility [] [] [] [] [] [] [] [] [] []    I=Independent, Mod I=Modified Independent, S=Supervision/Setup, SBA=Standby Assistance, CGA=Contact Guard Assistance, Min=Minimal Assistance, Mod=Moderate Assistance, Max=Maximal Assistance, Total=Total Assistance, NT=Not Tested    PLAN:   [facility] of Care: 3 times/week for duration of hospital stay or until stated goals are met, whichever comes first.    PROBLEM LIST:   (Skilled intervention is medically necessary to address:)  Decreased ADL/Functional Activities  Decreased Activity Tolerance  Decreased Balance  Decreased Cognition  Decreased Coordination  Decreased Gait Ability  Decreased Safety Awareness  Decreased Strength  Decreased Transfer Abilities   INTERVENTIONS PLANNED:  (Benefits and precautions of occupational therapy have been discussed with the patient.)  Self Care Training  Therapeutic Activity  Therapeutic Exercise/HEP  Neuromuscular Re-education  Manual Therapy  Education         TREATMENT:     EVALUATION: MODERATE COMPLEXITY: (Untimed Charge)    TREATMENT:   Co-Treatment PT/OT necessary due to patient's decreased overall endurance/tolerance levels, as well as need for high level skilled assistance to complete functional transfers/mobility and functional tasks  Neuromuscular Re-education (13 Minutes): Neuromuscular Re-education included Balance Training, Coordination training, and Sitting balance training to improve Balance, Coordination, and Postural Control.     TREATMENT GRID:  N/A    AFTER TREATMENT PRECAUTIONS: Bed and leaving with transport for ultrasound    INTERDISCIPLINARY COLLABORATION:  RN/ PCT and PT/ PTA    EDUCATION:  Education Given To: Patient  Education Provided: Role of Therapy  Education Method: Verbal  Barriers to Learning: Cognition  Education Outcome: Continued education needed    TOTAL TREATMENT DURATION AND TIME:  Time In: 1018  Time Out: 1034  Minutes: [address] [place], [place]

## 2022-10-27 NOTE — CARE COORDINATION
Chart reviewed by [facility] for discharge planning. Recommendation is for [facility] by PP and LATASHA leahy. PPD ordered  Will continue to follow for discharge planning needs  Please consult  if any new issues arise        Case Management Assessment  Initial Evaluation    Date/Time of Evaluation: 10/27/2022 4:07 PM  Assessment Completed by: France Van RN    If patient is discharged prior to next notation, then this note serves as note for discharge by case management. Patient Name: Jean Hay                   YOB: 1950  Diagnosis: Lower extremity edema [R60.0]  LACY (acute kidney injury) ([facility] [N17.9]  Acute kidney injury ([facility] [N17.9]  Acute cystitis with hematuria [N30.01]                   Date / Time: 10/26/2022  1:37 PM    Patient Admission Status: Inpatient   Readmission Risk (Low < 19, Mod (19-27), High > 27): Readmission Risk Score: 21.6    Current PCP: VIJAYA Clark CNP  PCP verified by CM? Yes    Chart Reviewed: Yes      History Provided by: Child/Family (sister Norris Gordon)  Patient Orientation: Person, Unable to Assess (Patient has dementia)    Patient Cognition: Dementia / Early Alzheimer's    Hospitalization in the last 30 days (Readmission):  Yes    If yes, Readmission Assessment in CM Navigator will be completed. Advance Directives:      Code Status: Full Code   Patient's Primary Decision Maker is: Named in [address] [place] [address]      Discharge Planning:    Patient lives with: Other (Comment) (SNF) Type of Home: [place] (recomendation  for [facility])  Primary Care Giver:  Other (Comment) (resident at [facility]))  Patient Support Systems include: Family Members, Other (Comment), /   Current Financial resources: Gilda Pearson ([place])  Current community resources:    Current services prior to admission: Other (Comment) ([facility])            Current DME:              Type of Home Care services:       ADLS  Prior functional level: Assistance with the following:, Bathing, Dressing, Toileting, Feeding, Mobility  Current functional level: Assistance with the following:, Bathing, Dressing, Feeding, Mobility    PT AM-PAC: 8 /24  OT AM-PAC: 14 /24    Family can provide assistance at DC: No  Would you like Case Management to discuss the discharge plan with any other family members/significant others, and if so, who?  Yes (sister Hernesto eRyes is patient's HCPOA)  Plans to Return to Present Housing: No (Patient Acuity to high for facility to meet patient needs)  Other Identified Issues/Barriers to RETURNING to current housing: patient acuity is greater than facility manages  Potential Assistance needed at discharge: Simon Rahman            Potential DME:    Patient expects to discharge to:  [address] [place] [address] for transportation at discharge:      Financial    Payor: [address] [place] [address] / Plan: MEDICARE PART A AND B / Product Type: *No Product type* /     Does insurance require precert for SNF: No    Potential assistance Purchasing Medications: No  Meds-to-Beds request:        [address], [address] [phone] - F [phone]  [facility] [address]  Phone: [phone] Fax: [phone] - [address] [address] [address] [place] [address] Road  Phone: [phone] Fax: [address] [place] [address] [phone] [facility] [phone]  [facility] [address]  Phone: [phone] Fax: [phone]      Notes:    Factors facilitating achievement of predicted outcomes: Family support, Cooperative, and Pleasant    Barriers to discharge: Pain, Confusion, Limited safety awareness, Upper extremity weakness, and Lower extremity weakness  The Plan for Transition of Care is related to the following treatment goals of Lower extremity edema [R60.0]  LACY (acute kidney injury) ([facility] [N17.9]  Acute kidney injury ([facility] [N17.9]  Acute cystitis with hematuria [L89.64]    IF APPLICABLE: The Patient and/or patient representative Lorraine Hirsch and her family were provided with a choice of provider and agrees with the discharge plan. Freedom of choice list with basic dialogue that supports the patient's individualized plan of care/goals and shares the quality data associated with the providers was provided to:     Patient Representative Name:       The Patient and/or Patient Representative Agree with the Discharge Plan? Ulises Hubbard RN  Case Management Department      3913 RNCM spoke with Arpit Latham via phone for assessment and discussion about [facility].  was able to provide 4 facilities for referrals for [address]   Referral sent via  [employer] 60 spoke with PT and OT about patient need for [facility], Per  patient was Independent with ADL's until fall in Aug 2022    1300 RNCM spoke with [facility] to confirm patient return to facility. Per Inna Meadows at [facility] [phone] patient acuity has increased and facility is no longer able to meet patients needs. Sister Carla Pineda  has been given 30 day notice on 10/21/22  to find a skilled facility.

## 2022-10-28 VITALS
WEIGHT: 227.3 LBS | RESPIRATION RATE: 18 BRPM | DIASTOLIC BLOOD PRESSURE: 81 MMHG | HEIGHT: 68 IN | HEART RATE: 107 BPM | SYSTOLIC BLOOD PRESSURE: 117 MMHG | BODY MASS INDEX: 34.45 KG/M2 | OXYGEN SATURATION: 98 % | TEMPERATURE: 97.5 F

## 2022-10-28 LAB
ANION GAP SERPL CALC-SCNC: 3 MMOL/L (ref 2–11)
BACTERIA SPEC CULT: NORMAL
BASOPHILS # BLD: 0.1 K/UL (ref 0–0.2)
BASOPHILS NFR BLD: 0 % (ref 0–2)
BUN SERPL-MCNC: 32 MG/DL (ref 8–23)
CALCIUM SERPL-MCNC: 8.6 MG/DL (ref 8.3–10.4)
CHLORIDE SERPL-SCNC: 114 MMOL/L (ref 101–110)
CO2 SERPL-SCNC: 26 MMOL/L (ref 21–32)
CREAT SERPL-MCNC: 1.2 MG/DL (ref 0.6–1)
DIFFERENTIAL METHOD BLD: ABNORMAL
EOSINOPHIL # BLD: 0.4 K/UL (ref 0–0.8)
EOSINOPHIL NFR BLD: 3 % (ref 0.5–7.8)
ERYTHROCYTE [DISTWIDTH] IN BLOOD BY AUTOMATED COUNT: 17.6 % (ref 11.9–14.6)
GLUCOSE BLD STRIP.AUTO-MCNC: 106 MG/DL (ref 65–100)
GLUCOSE BLD STRIP.AUTO-MCNC: 114 MG/DL (ref 65–100)
GLUCOSE BLD STRIP.AUTO-MCNC: 96 MG/DL (ref 65–100)
GLUCOSE SERPL-MCNC: 136 MG/DL (ref 65–100)
HCT VFR BLD AUTO: 42.8 % (ref 35.8–46.3)
HGB BLD-MCNC: 13.1 G/DL (ref 11.7–15.4)
IMM GRANULOCYTES # BLD AUTO: 0.2 K/UL (ref 0–0.5)
IMM GRANULOCYTES NFR BLD AUTO: 1 % (ref 0–5)
LYMPHOCYTES # BLD: 2.5 K/UL (ref 0.5–4.6)
LYMPHOCYTES NFR BLD: 21 % (ref 13–44)
MAGNESIUM SERPL-MCNC: 2.1 MG/DL (ref 1.8–2.4)
MCH RBC QN AUTO: 29.7 PG (ref 26.1–32.9)
MCHC RBC AUTO-ENTMCNC: 30.6 G/DL (ref 31.4–35)
MCV RBC AUTO: 97.1 FL (ref 82–102)
MM INDURATION, POC: 0 MM (ref 0–5)
MONOCYTES # BLD: 0.7 K/UL (ref 0.1–1.3)
MONOCYTES NFR BLD: 6 % (ref 4–12)
NEUTS SEG # BLD: 8.1 K/UL (ref 1.7–8.2)
NEUTS SEG NFR BLD: 68 % (ref 43–78)
NRBC # BLD: 0 K/UL (ref 0–0.2)
PLATELET # BLD AUTO: 220 K/UL (ref 150–450)
PMV BLD AUTO: 10.7 FL (ref 9.4–12.3)
POTASSIUM SERPL-SCNC: 3.3 MMOL/L (ref 3.5–5.1)
PPD, POC: NEGATIVE
RBC # BLD AUTO: 4.41 M/UL (ref 4.05–5.2)
SARS-COV-2 RDRP RESP QL NAA+PROBE: NOT DETECTED
SERVICE CMNT-IMP: ABNORMAL
SERVICE CMNT-IMP: ABNORMAL
SERVICE CMNT-IMP: NORMAL
SERVICE CMNT-IMP: NORMAL
SODIUM SERPL-SCNC: 143 MMOL/L (ref 133–143)
SOURCE: NORMAL
WBC # BLD AUTO: 12 K/UL (ref 4.3–11.1)

## 2022-10-28 PROCEDURE — 97535 SELF CARE MNGMENT TRAINING: CPT

## 2022-10-28 PROCEDURE — 97530 THERAPEUTIC ACTIVITIES: CPT

## 2022-10-28 PROCEDURE — 36415 COLL VENOUS BLD VENIPUNCTURE: CPT

## 2022-10-28 PROCEDURE — 87635 SARS-COV-2 COVID-19 AMP PRB: CPT

## 2022-10-28 PROCEDURE — 80048 BASIC METABOLIC PNL TOTAL CA: CPT

## 2022-10-28 PROCEDURE — 2580000003 HC RX 258: Performed by: FAMILY MEDICINE

## 2022-10-28 PROCEDURE — 6370000000 HC RX 637 (ALT 250 FOR IP): Performed by: FAMILY MEDICINE

## 2022-10-28 PROCEDURE — 85025 COMPLETE CBC W/AUTO DIFF WBC: CPT

## 2022-10-28 PROCEDURE — 83735 ASSAY OF MAGNESIUM: CPT

## 2022-10-28 PROCEDURE — 82962 GLUCOSE BLOOD TEST: CPT

## 2022-10-28 PROCEDURE — 6370000000 HC RX 637 (ALT 250 FOR IP): Performed by: INTERNAL MEDICINE

## 2022-10-28 RX ORDER — POTASSIUM CHLORIDE 20 MEQ/1
40 TABLET, EXTENDED RELEASE ORAL ONCE
Status: DISCONTINUED | OUTPATIENT
Start: 2022-10-28 | End: 2022-10-28 | Stop reason: HOSPADM

## 2022-10-28 RX ORDER — SACCHAROMYCES BOULARDII 250 MG
250 CAPSULE ORAL 2 TIMES DAILY
Qty: 14 CAPSULE | Refills: 0 | Status: SHIPPED | OUTPATIENT
Start: 2022-10-28 | End: 2022-11-04

## 2022-10-28 RX ORDER — CEFDINIR 300 MG/1
300 CAPSULE ORAL 2 TIMES DAILY
Qty: 6 CAPSULE | Refills: 0 | Status: SHIPPED | OUTPATIENT
Start: 2022-10-28 | End: 2022-10-28 | Stop reason: SDUPTHER

## 2022-10-28 RX ORDER — CEFDINIR 300 MG/1
300 CAPSULE ORAL 2 TIMES DAILY
Qty: 6 CAPSULE | Refills: 0 | Status: SHIPPED | OUTPATIENT
Start: 2022-10-28 | End: 2022-10-31

## 2022-10-28 RX ADMIN — APIXABAN 5 MG: 5 TABLET, FILM COATED ORAL at 09:13

## 2022-10-28 RX ADMIN — METOPROLOL SUCCINATE 50 MG: 50 TABLET, EXTENDED RELEASE ORAL at 09:13

## 2022-10-28 RX ADMIN — Medication 250 MG: at 09:14

## 2022-10-28 RX ADMIN — FAMOTIDINE 20 MG: 20 TABLET, FILM COATED ORAL at 09:14

## 2022-10-28 RX ADMIN — LEVOTHYROXINE SODIUM 75 MCG: 0.07 TABLET ORAL at 06:35

## 2022-10-28 RX ADMIN — SODIUM CHLORIDE, PRESERVATIVE FREE 10 ML: 5 INJECTION INTRAVENOUS at 09:18

## 2022-10-28 RX ADMIN — ATORVASTATIN CALCIUM 40 MG: 40 TABLET, FILM COATED ORAL at 09:14

## 2022-10-28 RX ADMIN — CEFDINIR 300 MG: 300 CAPSULE ORAL at 09:14

## 2022-10-28 RX ADMIN — OXYCODONE HYDROCHLORIDE AND ACETAMINOPHEN 250 MG: 500 TABLET ORAL at 09:14

## 2022-10-28 RX ADMIN — MEMANTINE 10 MG: 5 TABLET ORAL at 09:14

## 2022-10-28 RX ADMIN — TAMSULOSIN HYDROCHLORIDE 0.4 MG: 0.4 CAPSULE ORAL at 09:14

## 2022-10-28 ASSESSMENT — PAIN SCALES - GENERAL: PAINLEVEL_OUTOF10: 0

## 2022-10-28 NOTE — PROGRESS NOTES
Physician Progress Note      Yassine Weems  CSN #:                  [id number]  :                       1950  ADMIT DATE:       10/26/2022 1:37 PM  [facility] DATE:  RESPONDING  PROVIDER #:        Rossy Palmer MD          QUERY TEXT:    Patient admitted with LACY and noted documentation of UTI. In order to support   the diagnosis of UTI, please include additional clinical indicators in your   documentation. Or please document if the diagnosis of UTI has been ruled out   after further study. The medical record reflects the following:  Risk Factors: hx UTI  Clinical Indicators: UA with 4plus bacteria, positive nitrite. Urine culture   negative. Treatment: Rocephin      [email or web address]  Options provided:  -- UTI present as evidenced by, Please document evidence.   -- UTI was ruled out  -- Other - I will add my own diagnosis  -- Disagree - Not applicable / Not valid  -- Disagree - Clinically unable to determine / Unknown  -- Refer to Clinical Documentation Reviewer    PROVIDER RESPONSE TEXT:    UTI is present as evidenced by Urinalysis being positive for nitrites and   leukocyte esterace    Query created by: Angel Ambrosio on 10/28/2022 12:32 PM      Electronically signed by:  Rossy Palmer MD 10/28/2022 2:00 PM

## 2022-10-28 NOTE — PROGRESS NOTES
ACUTE PHYSICAL THERAPY GOALS:   (Developed with and agreed upon by patient and/or caregiver.)     (1.) Jazz Saldana  will move from supine to sit and sit to supine  with MINIMAL ASSIST within 7 treatment day(s). (2.) Jazz Saldana will transfer from bed to chair and chair to bed with MODERATE ASSIST using the least restrictive device within 7 treatment day(s). (3.) Jazz Saldana will ambulate with MAXIMAL ASSIST for 10 feet with the least restrictive device within 7 treatment day(s). (4.) Jazz Saldana will perform seated static and dynamic balance activities x 10 minutes with SUPERVISION to improve safety within 7 treatment day(s). (5.) Jazz Saldana will perform bilateral lower extremity exercises x 10 min for HEP with MINIMAL ASSIST to improve strength, endurance, and functional mobility within 7 treatment day(s). PHYSICAL THERAPY: Daily Note AM   (Link to Caseload Tracking: PT Visit Days : 2  Time In/Out PT Charge Capture  Rehab Caseload Tracker  Orders    Jazz Saldana is a 67 y.o. female   PRIMARY DIAGNOSIS: LACY (acute kidney injury) ([address]  Lower extremity edema [R60.0]  LACY (acute kidney injury) ([address] [N17.9]  Acute kidney injury ([address] [N17.9]  Acute cystitis with hematuria [N30.01]       Inpatient: Payor: MEDICARE / Plan: MEDICARE PART A AND B / Product Type: *No Product type* /     ASSESSMENT:     REHAB RECOMMENDATIONS:   Recommendation to date pending progress:  Setting:  Short-term Rehab  Vs return to memory care facility     Equipment:    To Be Determined     ASSESSMENT:  Ms. Abbie Roque was supine in bed on arrival. She was pretty quiet during treatment but would answer questions. She required mod/max A x2 for bed mobility. Tolerated sitting EOB to perform dynamic sitting balance activities with SBA. She states that she normally uses a RW. When attempting to stand, pt begins to lean to the side and backwards in an attempt to not stand.  Unable to stand as pt needed max A for sitting balance until she was told that we were not standing. Returned to supine with mod/max A x2. Rolling left and right for linen adjustments with mod A. Pt left supine with needs in reach.      SUBJECTIVE:   Ms. Lisa Bourgeois states, \"No\"     Social/Functional Lives With: Alone, Other (comment) ([facility] [address])  Type of Home: Assisted living  Home Layout: One level  Bathroom Toilet: Handicap height  Home Equipment: Josse Patel, beatrice, [address] Help From: Family  ADL Assistance: Needs assistance  Bath: Maximum assistance  Dressing: Maximum assistance  Grooming: Maximum assistance  Feeding: Maximum assistance  Toileting: Needs assistance  Ambulation Assistance: Needs assistance  Transfer Assistance: Needs assistance  Active : No  Occupation: Retired  OBJECTIVE:     PAIN: VITALS / O2: PRECAUTION / Evalene Schillings / Lorella Roles:   Pre Treatment:  none         Post Treatment: none Vitals        Oxygen    IV and Purewick    RESTRICTIONS/PRECAUTIONS:  Restrictions/Precautions  Restrictions/Precautions: Fall Risk  Restrictions/Precautions: Fall Risk     MOBILITY: I Mod I S SBA CGA Min Mod Max Total  NT x2 Comments:   Bed Mobility    Rolling [] [] [] [] [] [] [] [] [] [] []    Supine to Sit [] [] [] [] [] [] [x] [x] [] [] [x]    Scooting [] [] [] [] [] [] [] [x] [] [] []    Sit to Supine [] [] [] [] [] [] [x] [x] [] [] [x]    Transfers    Sit to Stand [] [] [] [] [] [] [] [] [] [x] []    Bed to Chair [] [] [] [] [] [] [] [] [] [x] []    Stand to Sit [] [] [] [] [] [] [] [] [] [x] []     [] [] [] [] [] [] [] [] [] [x] []    I=Independent, Mod I=Modified Independent, S=Supervision, SBA=Standby Assistance, CGA=Contact Guard Assistance,   Min=Minimal Assistance, Mod=Moderate Assistance, Max=Maximal Assistance, Total=Total Assistance, NT=Not Tested    BALANCE: Good Fair+ Fair Fair- Poor NT Comments   Sitting Static [x] [] [] [] [] []    Sitting Dynamic [] [] [x] [] [] []              Standing Static [] [] [] [] [] [x]    Standing Dynamic [] [] [] [] [] [x]      GAIT: I Mod I S SBA CGA Min Mod Max Total  NT x2 Comments:   Level of Assistance [] [] [] [] [] [] [] [] [] [] []    Distance   feet    DME N/A    Gait Quality N/A    Weightbearing Status      Stairs      I=Independent, Mod I=Modified Independent, S=Supervision, SBA=Standby Assistance, CGA=Contact Guard Assistance,   Min=Minimal Assistance, Mod=Moderate Assistance, Max=Maximal Assistance, Total=Total Assistance, NT=Not Tested    PLAN:   FREQUENCY AND DURATION: 3 times/week for duration of hospital stay or until stated goals are met, whichever comes first.    TREATMENT:   TREATMENT:   Therapeutic Activity (28 Minutes): Therapeutic activity included Supine to Sit, Sit to Supine, Scooting, Sitting balance , and Standing balance to improve functional Activity tolerance, Coordination, Mobility, and Strength.     TREATMENT GRID:  N/A    AFTER TREATMENT PRECAUTIONS: Bed, Bed/Chair Locked, Call light within reach, Heels floated, and Needs within reach    INTERDISCIPLINARY COLLABORATION:  PT/ PTA and OT/ JAUREGUI    EDUCATION:      TIME IN/OUT:  Time In: 1120  Time Out: 1148  [facility]: [facility]

## 2022-10-28 NOTE — PROGRESS NOTES
ACUTE OCCUPATIONAL THERAPY GOALS:   (Developed with and agreed upon by patient and/or caregiver.)  1. Patient will complete upper body bathing and dressing with MIN ASSIST and adaptive equipment as needed. 2. Patient will complete self-grooming tasks in unsupported sitting with SBA and adaptive equipment as needed. 3. Patient will tolerate 25 minutes of OT treatment with 1-2 rest breaks to increase activity tolerance for ADLs. 4. Patient will complete functional transfers with MIN ASSIST and adaptive equipment as needed. 5. Patient will complete functional activity while seated edge of bed with SBA and adaptive equipment as needed. 6. Patient will tolerate 15 minutes unsupported sitting balance with SBA in preparation for ADL performance. 7. Patient will tolerate 10 minutes BUE therapeutic activities to increase use of BUE during ADL performance. Timeframe: 7 visits     OCCUPATIONAL THERAPY: Daily Note AM   OT Visit Days: 2   Time  OT Charge Capture  Rehab Caseload Tracker  OT Orders    Naomi Dugan is a 67 y.o. female   PRIMARY DIAGNOSIS: LACY (acute kidney injury) ([facility]  Lower extremity edema [R60.0]  LACY (acute kidney injury) ([facility] [N17.9]  Acute kidney injury ([facility] [N17.9]  Acute cystitis with hematuria [N30.01]       Inpatient: Payor: MEDICARE / Plan: MEDICARE PART A AND B / Product Type: *No Product type* /     ASSESSMENT:     REHAB RECOMMENDATIONS: CURRENT LEVEL OF FUNCTION:  (Most Recently Demonstrated)   Recommendation to date pending progress:  Setting:  Short-term Rehab    Equipment:    To Be Determined Bathing:  Maximal Assist  Dressing:  Maximal Assist  Feeding/Grooming:  Maximal Assist  Toileting:  Not Tested  Functional Mobility:  Maximal Assist x 2 attempt to stand, not successful     ASSESSMENT:  Ms. Francisco Otoole is a 66 y/o female who presented this admission for LACY and hematuria. Pt has hx of dementia so unable to get clear understanding of PLOF.  Per chart, pt was admitted from memory care facility. This date, pt calm and oriented to self, location, and year. However, she was noted to have confusion throughout evaluation. This date, nodding yes/no in response to questions and didn't talk much. Would push back when not wanting to participate. Mod-Max Ax2 for supine<>sit. Max Ax2 for attempt to stand, however unsuccessful and pt resisted/push away from therapists. Grooming/bathing while seated EOB with Max A for initiating. Pt continues to present below their baseline and would benefit from skilled OT services to address OT goals and plan of care.  Rec STR at d/c.       SUBJECTIVE:     Ms. Elise Needs states, \"I have 2 sisters\"     Social/Functional Lives With: Alone, Other (comment) ([address] [facility] [address] [facility])  Type of Home: Assisted living  Home Layout: One level  Bathroom Toilet: Handicap height  Home Equipment: Irma Lagunas, beatrice, [address] Help From: Family  ADL Assistance: Needs assistance  Bath: Maximum assistance  Dressing: Maximum assistance  Grooming: Maximum assistance  Feeding: Maximum assistance  Toileting: Needs assistance  Ambulation Assistance: Needs assistance  Transfer Assistance: Needs assistance  Active : No  Occupation: Retired    OBJECTIVE:     aRe Bradley / Nichole Hall / Adela Callas: IV    RESTRICTIONS/PRECAUTIONS:  Restrictions/Precautions  Restrictions/Precautions: Fall Risk        PAIN: Jil Blight / O2:   Pre Treatment:      0/10      Post Treatment: 0/10 Vitals          Oxygen        MOBILITY: I Mod I S SBA CGA Min Mod Max Total  NT x2 Comments:   Bed Mobility    Rolling [] [] [] [] [] [] [] [] [] [] []    Supine to Sit [] [] [] [] [] [] [x] [x] [] [] [x]    Scooting [] [] [] [] [] [] [x] [x] [] [] [x]    Sit to Supine [] [] [] [] [] [] [x] [x] [] [] [x]    Transfers    Sit to Stand [] [] [] [] [] [] [] [x] [] [] [x] Attempt, unsuccessful   Bed to Chair [] [] [] [] [] [] [] [] [] [] []    Stand to Sit [] [] [] [] [] [] [] [] [] [] []    Tub/Shower [] [] [] [] [] [] [] [] [] [] []     Toilet [] [] [] [] [] [] [] [] [] [] []      [] [] [] [] [] [] [] [] [] [] []    I=Independent, Mod I=Modified Independent, S=Supervision/Setup, SBA=Standby Assistance, CGA=Contact Guard Assistance, Min=Minimal Assistance, Mod=Moderate Assistance, Max=Maximal Assistance, Total=Total Assistance, NT=Not Tested    ACTIVITIES OF DAILY LIVING: I Mod I S SBA CGA Min Mod Max Total NT Comments   BASIC ADLs:              Upper Body   Bathing [] [] [] [] [] [] [] [x] [] [] Seated EOB   Lower Body Bathing [] [] [] [] [] [] [] [x] [] [] Seated EOB   Toileting [] [] [] [] [] [] [] [] [] []    Upper Body Dressing [] [] [] [] [] [] [] [] [] []    Lower Body Dressing [] [] [] [] [] [] [] [] [] []    Feeding [] [] [] [] [] [] [] [] [] []    Grooming [] [] [] [] [] [] [] [x] [] [] Seated EOB   Personal Device Care [] [] [] [] [] [] [] [] [] []    Functional Mobility [] [] [] [] [] [] [] [] [] []    I=Independent, Mod I=Modified Independent, S=Supervision/Setup, SBA=Standby Assistance, CGA=Contact Guard Assistance, Min=Minimal Assistance, Mod=Moderate Assistance, Max=Maximal Assistance, Total=Total Assistance, NT=Not Tested    BALANCE: Good Fair+ Fair Fair- Poor NT Comments   Sitting Static [x] [] [] [] [] []    Sitting Dynamic [] [] [x] [] [] []              Standing Static [] [] [] [] [] [x]    Standing Dynamic [] [] [] [] [] [x]        PLAN:     FREQUENCY/DURATION   OT Plan of Care: 3 times/week for duration of hospital stay or until stated goals are met, whichever comes first.    TREATMENT:     TREATMENT:   Co-Treatment PT/OT necessary due to patient's decreased overall endurance/tolerance levels, as well as need for high level skilled assistance to complete functional transfers/mobility and functional tasks  Self Care (28 minutes): Patient participated in upper body bathing, lower body bathing, and grooming ADLs in unsupported sitting with maximal verbal, manual, and tactile cueing to increase independence and decrease assistance required. Patient also participated in functional transfer training to increase independence, decrease assistance required, and increase activity tolerance.      TREATMENT GRID:  N/A    AFTER TREATMENT PRECAUTIONS: Alarm Activated, Bed, Bed/Chair Locked, Call light within reach, Needs within reach, and RN notified    INTERDISCIPLINARY COLLABORATION:  RN/ PCT and PT/ PTA    EDUCATION:       TOTAL TREATMENT DURATION AND TIME:  Time In: 1120  Time Out: [facility]  Minutes: [address] [place], [place]

## 2022-10-28 NOTE — DISCHARGE SUMMARY
Hospitalist Discharge Summary   Admit Date:  10/26/2022  1:37 PM   DC Note date: 10/28/2022  Name:  Yolette Dickson   Age:  67 y.o. Sex:  female  :  1950   MRN:  [id number]   Room:  [facility]/  PCP:  VIJAYA Adorno CNP    Presenting Complaint: Vaginal Bleeding     Initial Admission Diagnosis: Lower extremity edema [R60.0]  LACY (acute kidney injury) ([address] [N17.9]  Acute kidney injury ([address] [N17.9]  Acute cystitis with hematuria [N30.01]     Problem List for this Hospitalization (present on admission):    Principal Problem:    LACY (acute kidney injury) ([address]  Active Problems:    Hypernatremia    SIRS (systemic inflammatory response syndrome) ([address]    Hematuria    Acute UTI    Vaginal bleeding    Lower extremity edema    Dementia without behavioral disturbance ([address]    Type 2 diabetes with nephropathy ([address]    Atrial fibrillation ([address]    Acquired hypothyroidism    Hypercholesterolemia    Obesity (BMI 30.0-34. 9)  Resolved Problems:    * No resolved hospital problems. *      Hospital Course:  Yolette Dickson is a 67 y.o. female with medical history of PAF on Eliquis, dementia who presented from memory care facility with concerns for bleeding in her diaper, unsure if vaginal bleeding or hematuria of 1 month duration. HPI limited due to patient's history of dementia. Sister at bedside stated that staff at the facility noted that she had dark blood in her diaper. She has a history of uterine mass that she was told could be fibroids diagnosed about 4 years ago, saw GYN but sister unsure of workup. Has not been following up with GYN. She takes Eliquis for A. fib but this has been held at the facility due to concerns for the bleeding but it persisted so EMS was called. Patient denies fever, chills, SOB, chest pain, abdominal pain, nausea, vomiting     Of note, patient was recently admitted 10/7-10/13 for for sepsis due to UTI, urinary retention and A. fib with RVR. In ED, VSS. Hgb 14.6. Cr 1.7 (baseline <1).  Na 151. UA c/w UTI. Per ED provider, no vaginal bleeding on exam. Pt was given Rocephin in ED. LACY: due to dehydration given her associated elevated sodium level. Her renal function has continued to improve with iv fluids and is likely at a new baseline. Her CT abd and pelvis was negative for obstruction    SIRS: due to acute UTI with hematuria. Her Urine culture is still negative at time of discharge. She will complete a 5 day course of treatment. Hypernatremia: resolved at time of discharge    Vaginal bleeding: no evidence of this and after discussing with patient's sister (the HCPOA). It was more that her urine was dark in color which was due to her current UTI. Her Hg has stayed in the normal range during her admission    Left lower extremity edema: due to edema. Her venous doppler was negative for evidence of a DVT    Pafib: she will be continued on eliquis and metoprolol    DM II: I have stopped her glyburide-Metformin due to the potential of this medication to cause hypoglycemia given her renal function    Obesity:  complicates care, lifestyle modifications encouraged      Disposition: SNF  Diet: ADULT DIET; Regular; 3 carb choices (45 gm/meal)  Code Status: Full Code    Follow Ups:   Contact information for after-discharge care     Discharge [address] ([address]) . Service: Skilled Nursing  Contact information:  [facility] [address] [address] [phone]                           Time spent in patient discharge and coordination 34 minutes. Follow up labs/diagnostics (ultimately defer to outpatient provider):  None    Plan was discussed with patient. All questions answered. Patient was stable at time of discharge. Instructions given to call a physician or return if any concerns.     Current Discharge Medication List        START taking these medications    Details   saccharomyces boulardii (FLORASTOR) 250 MG capsule Take 1 capsule by mouth 2 times daily for 7 days  Qty: 14 capsule, Refills: 0      cefdinir (OMNICEF) 300 MG capsule Take 1 capsule by mouth 2 times daily for 3 days  Qty: 6 capsule, Refills: 0           CONTINUE these medications which have NOT CHANGED    Details   apixaban (ELIQUIS) 5 MG TABS tablet Take 1 tablet by mouth 2 times daily  Qty: 60 tablet, Refills: 0      metoprolol succinate (TOPROL XL) 25 MG extended release tablet Take 2 tablets by mouth daily  Qty: 30 tablet, Refills: 3      tamsulosin (FLOMAX) 0.4 MG capsule Take 1 capsule by mouth daily  Qty: 30 capsule, Refills: 3      nystatin (MYCOSTATIN) 628000 UNIT/ML suspension Take 5 mLs by mouth 4 times daily  Qty: 1 each, Refills: 0      FREESTYLE LITE strip USE TO CHECK BLOOD GLUCOSE ONCE DAILY  Qty: 100 strip, Refills: 3      FreeStyle Lancets MISC USE TO CHECK BLOOD GLUCOSE ONCE DAILY  Qty: 100 each, Refills: 3      acetaminophen (TYLENOL) 325 MG tablet Take 650 mg by mouth every 4 hours as needed      ascorbic acid (VITAMIN C) 250 MG tablet Take 250 mg by mouth daily      atorvastatin (LIPITOR) 40 MG tablet TAKE 1 TABLET DAILY      levothyroxine (SYNTHROID) 75 MCG tablet TAKE 1 TABLET DAILY BEFORE BREAKFAST      memantine (NAMENDA) 10 MG tablet TAKE 1 TABLET TWICE A DAY      nystatin (MYCOSTATIN) 587770 UNIT/GM powder Apply powder to skin folds between breasts and abdomen 4 times daily      SITagliptin (JANUVIA) 100 MG tablet Take 100 mg by mouth daily           STOP taking these medications       glyBURIDE-metFORMIN (GLUCOVANCE) 2.5-500 MG per tablet Comments:   Reason for Stopping:               Procedures done this admission:  * No surgery found *    Consults this admission:  IP CONSULT TO DIABETES MANAGEMENT    Echocardiogram results:  10/07/22    TRANSTHORACIC ECHOCARDIOGRAM (TTE) COMPLETE (CONTRAST/BUBBLE/3D PRN) 10/09/2022  8:03 AM (Final)    Interpretation Summary    Left Ventricle: Normal left ventricular systolic function.  Left ventricle size is normal. Mildly increased wall thickness. Normal wall motion. Mitral Valve: Mild regurgitation. Left Atrium: Left atrium is mildly dilated. Technical qualifiers: Technically difficult study, color flow Doppler was performed and pulse wave and/or continuous wave Doppler was performed. Contrast used: Definity. Signed by: Junie Ennis MD on 10/9/2022  8:03 AM      Diagnostic Imaging/Tests:   CT ABDOMEN PELVIS WO CONTRAST Additional Contrast? Radiologist Recommendation    Result Date: 10/26/2022  -0.8 cm calculus in the right mid/distal ureter with associated mild upstream hydroureteronephrosis. Additional 1.2 cm calculus in the right renal pelvis. -Large heterogeneous mass in the right hemipelvis and lower abdomen likely arising from uterus, grossly similar to prior exam from 8/11/2022. This could reflect a large fibroid but  malignancy is also a consideration. US PELVIS COMPLETE    Result Date: 10/27/2022  Large mass in the uterus consistent with a fibroid. Endometrium is obscured. XR CHEST PORTABLE    Result Date: 10/7/2022  The lungs are clear. The heart is normal in size. No pneumothorax. No pleural effusions. US RETROPERITONEAL COMPLETE    Result Date: 10/9/2022  1. No evidence of hydronephrosis. 2. 6.7 cm right kidney simple cyst. 3. Normal left kidney. 4. Known pelvic mass. MRI BRAIN WO CONTRAST    Result Date: 10/11/2022  1. Mild chondrolysis examination due to motion. 2. Moderately advanced temporal lobe predominant volume loss. 3. Mild chronic small vessel ischemic change. Vascular duplex lower extremity venous bilateral    Result Date: 10/26/2022  The left peroneal vein is not well visualized. Flow was not documented, and occlusive DVT is possible. Otherwise no evidence of deep venous thrombosis in the bilateral lower extremities.         Labs: Results:       BMP, Mg, Phos Recent Labs     10/26/22  1354 10/27/22  0822 10/28/22  0917   * 150* 143   K 4.0 3.9 3.3*   * 120* 114*   CO2 23 28 26   ANIONGAP 5 2 3   BUN 53* 43* 32*   CREATININE 1.70* 1.40* 1.20*   LABGLOM 31* 40* 48*   CALCIUM 9.2 9.0 8.6   GLUCOSE 94 148* 136*   MG  --   --  2.1      CBC Recent Labs     10/26/22  1354 10/27/22  0822 10/28/22  0917   WBC 11.6* 9.2 12.0*   RBC 5.07 5.02 4.41   HGB 14.9 14.8 13.1   HCT 48.8* 49.8* 42.8   MCV 96.3 99.2 97.1   MCH 29.4 29.5 29.7   MCHC 30.5* 29.7* 30.6*   RDW 19.1* 18.6* 17.6*    237 220   MPV 10.2 10.4 10.7   NRBC 0.03 0.02 0.00   SEGS 65 65 68   LYMPHOPCT 24 23 21   EOSRELPCT 1 3 3   MONOPCT 8 7 6   BASOPCT 1 1 0   IMMGRAN 2 2 1   SEGSABS 7.5 6.0 8.1   LYMPHSABS 2.8 2.2 2.5   EOSABS 0.2 0.2 0.4   MONOSABS 0.9 0.6 0.7   BASOSABS 0.1 0.1 0.1   ABSIMMGRAN 0.2 0.2 0.2      LFT Recent Labs     10/26/22  1354   BILITOT 0.6   ALKPHOS 82   AST 40*   ALT 31   PROT 6.8   LABALBU 3.3   GLOB 3.5      Cardiac  No results found for: NTPROBNP, TROPHS   Coags Lab Results   Component Value Date/Time    PROTIME 15.1 10/26/2022 01:54 PM    PROTIME >120.0 10/07/2022 03:21 PM    INR 1.1 10/26/2022 01:54 PM    INR >16.2 10/07/2022 03:21 PM    APTT >200.0 10/07/2022 03:21 PM      A1c Lab Results   Component Value Date/Time    LABA1C 5.7 10/26/2022 01:54 PM    LABA1C 6.3 03/14/2022 02:23 AM    LABA1C 6.0 09/03/2021 09:05 AM     10/26/2022 01:54 PM     03/14/2022 02:23 AM     09/03/2021 09:05 AM      Lipids Lab Results   Component Value Date/Time    CHOL 131 03/14/2022 02:23 AM    LDLCALC 51 03/14/2022 02:23 AM    LABVLDL 29 02/04/2020 12:17 PM    HDL 53 03/14/2022 02:23 AM    TRIG 158 03/14/2022 02:23 AM      Thyroid  No results found for: Eual Beer     Most Recent UA Lab Results   Component Value Date/Time    COLORU BROWN 10/26/2022 02:29 PM    APPEARANCE CLOUDY 10/26/2022 02:29 PM    SPECGRAV 1.025 10/26/2022 02:29 PM    LABPH 6.5 10/26/2022 02:29 PM    PROTEINU 100 10/26/2022 02:29 PM    GLUCOSEU Negative 10/26/2022 02:29 PM    KETUA TRACE 10/26/2022 02:29 PM    BILIRUBINUR MODERATE 10/26/2022 02:29 PM    BLOODU LARGE 10/26/2022 02:29 PM    UROBILINOGEN 1.0 10/26/2022 02:29 PM    NITRU Positive 10/26/2022 02:29 PM    LEUKOCYTESUR MODERATE 10/26/2022 02:29 PM    WBCUA  10/26/2022 02:29 PM    RBCUA >100 10/26/2022 02:29 PM    EPITHUA 0 10/26/2022 02:29 PM    BACTERIA 4+ 10/26/2022 02:29 PM    LABCAST 0 10/26/2022 02:29 PM    MUCUS 0 10/26/2022 02:29 PM        Recent Labs     10/26/22  1715 10/26/22  1429 10/11/22  2031 10/09/22  0941 10/07/22  1133   CULTURE NO GROWTH 2 DAYS  NO GROWTH 2 DAYS NO GROWTH 2 DAYS NO GROWTH 5 DAYS  NO GROWTH 5 DAYS NO GROWTH 5 DAYS >100,000 COLONIES/mL MIXED SKIN BUSTER ISOLATED  THREE OR MORE TYPES OF ORGANISMS ARE PRESENT. THIS IS INDICATIVE OF CONTAMINATION DUE TO IMPROPER COLLECTION TECHNIQUE. PLEASE REPEAT COLLECTION UNLESS PATIENT HAS STARTED ANTIBIOTIC TREATMENT.        All Labs from Last 24 Hrs:  Recent Results (from the past 24 hour(s))   POCT Glucose    Collection Time: 10/27/22  5:00 PM   Result Value Ref Range    POC Glucose 135 (H) 65 - 100 mg/dL    Performed by: Marychuy Celaya [facility] PPD TEST IN 24 HRS    Collection Time: 10/27/22  7:10 PM   Result Value Ref Range    PPD, (POC) Negative Negative    mm Induration 0 0 - 5 mm   POCT Glucose    Collection Time: 10/27/22  9:03 PM   Result Value Ref Range    POC Glucose 160 (H) 65 - 100 mg/dL    Performed by: WillyTRiley    POCT Glucose    Collection Time: 10/28/22  7:12 AM   Result Value Ref Range    POC Glucose 106 (H) 65 - 100 mg/dL    Performed by: Linden    Basic Metabolic Panel w/ Reflex to MG    Collection Time: 10/28/22  9:17 AM   Result Value Ref Range    Sodium 143 133 - 143 mmol/L    Potassium 3.3 (L) 3.5 - 5.1 mmol/L    Chloride 114 (H) 101 - 110 mmol/L    CO2 26 21 - 32 mmol/L    Anion Gap 3 2 - 11 mmol/L    Glucose 136 (H) 65 - 100 mg/dL    BUN 32 (H) 8 - 23 MG/DL    Creatinine 1.20 (H) 0.6 - 1.0 MG/DL    Est, Glom Filt Rate 48 (L) >60 ml/min/1.73m2    Calcium 8.6 8.3 - 10.4 MG/DL   CBC with Auto Differential    Collection Time: 10/28/22  9:17 AM   Result Value Ref Range    WBC 12.0 (H) 4.3 - 11.1 K/uL    RBC 4.41 4.05 - 5.2 M/uL    Hemoglobin 13.1 11.7 - 15.4 g/dL    Hematocrit 42.8 35.8 - 46.3 %    MCV 97.1 82 - 102 FL    MCH 29.7 26.1 - 32.9 PG    MCHC 30.6 (L) 31.4 - 35.0 g/dL    RDW 17.6 (H) 11.9 - 14.6 %    Platelets 547 292 - 224 K/uL    MPV 10.7 9.4 - 12.3 FL    nRBC 0.00 0.0 - 0.2 K/uL    Differential Type AUTOMATED      Seg Neutrophils 68 43 - 78 %    Lymphocytes 21 13 - 44 %    Monocytes 6 4.0 - 12.0 %    Eosinophils % 3 0.5 - 7.8 %    Basophils 0 0.0 - 2.0 %    Immature Granulocytes 1 0.0 - 5.0 %    Segs Absolute 8.1 1.7 - 8.2 K/UL    Absolute Lymph # 2.5 0.5 - 4.6 K/UL    Absolute Mono # 0.7 0.1 - 1.3 K/UL    Absolute Eos # 0.4 0.0 - 0.8 K/UL    Basophils Absolute 0.1 0.0 - 0.2 K/UL    Absolute Immature Granulocyte 0.2 0.0 - 0.5 K/UL   Magnesium    Collection Time: 10/28/22  9:17 AM   Result Value Ref Range    Magnesium 2.1 1.8 - 2.4 mg/dL   POCT Glucose    Collection Time: 10/28/22 11:14 AM   Result Value Ref Range    POC Glucose 114 (H) 65 - 100 mg/dL    Performed by: Linden        No Known Allergies  Immunization History   Administered Date(s) Administered    COVID-19, MODERNA BLUE border, Primary or Immunocompromised, (age 12y+), IM, 100 mcg/0.5mL 01/13/2021, 02/10/2021, 12/20/2021    Influenza Virus Vaccine 09/26/2007, 09/26/2011, 09/10/2020    Influenza, FLUARIX, FLULAVAL, FLUZONE (age 10 mo+) AND AFLURIA, (age 1 y+), PF, 0.5mL 10/11/2018, 10/29/2019    PPD Test 08/12/2022, 10/08/2022, 10/26/2022    Pneumococcal Conjugate 13-valent ([id number]) 01/23/2019    Pneumococcal Polysaccharide ([id number]) 02/12/2015    Tdap (Boostrix, Adacel) 02/12/2015    Zoster Recombinant (Shingrix) 06/23/2019, 09/24/2019       Recent Vital Data:  Patient Vitals for the past 24 hrs:   Temp Pulse Resp BP SpO2   10/28/22 1031 97.8 °F (36.6 °C) (!) 42 18 109/73 99 %   10/28/22 0718 97.3 °F (36.3 °C) 81 18 126/77 100 %   10/28/22 0311 97.3 °F (36.3 °C) 94 17 117/86 98 %   10/27/22 2259 97.5 °F (36.4 °C) 90 17 (!) 135/96 98 %   10/27/22 1948 97.7 °F (36.5 °C) 95 18 114/74 98 %   10/27/22 1430 98.1 °F (36.7 °C) 84 14 113/78 97 %       Oxygen Therapy  SpO2: 99 %  Pulse Oximetry Type: Intermittent  O2 Device: None (Room air)  Oxygen Therapy: None (Room air)    Estimated body mass index is 34.56 kg/m² as calculated from the following:    Height as of this encounter: 5' 8\" (1.727 m). Weight as of this encounter: 227 lb 4.8 oz (103.1 kg). Intake/Output Summary (Last 24 hours) at 10/28/2022 1406  Last data filed at 10/28/2022 1300  Gross per 24 hour   Intake 1827.34 ml   Output 800 ml   Net 1027.34 ml         Physical Exam:    General:    Well nourished. No overt distress  Head:  Normocephalic, atraumatic  Eyes:  Sclerae appear normal.  Pupils equally round. HENT:  Nares appear normal, no drainage. Moist mucous membranes  Neck:  No restricted ROM. Trachea midline  CV:   RRR. No m/r/g. No JVD  Lungs:   CTAB. No wheezing, rhonchi, or rales. Respirations even, unlabored  Abdomen:   Soft, nontender, nondistended. Extremities: Warm and dry. No cyanosis or clubbing. No edema. Skin:     No rashes. Normal coloration  Neuro:  CN II-XII grossly intact. A+O x 1-2  Psych:  Normal mood and affect. Signed:  Nancy Tony MD    Part of this note may have been written by using a voice dictation software. The note has been proof read but may still contain some grammatical/other typographical errors.

## 2022-10-28 NOTE — DIABETES MGMT
Patient admitted with LACY. Blood glucose ranged 120-199 yesterday with patient receiving no diabetes medications. Blood glucose this morning was 106. Reviewed patient current regimen: Humalog correctional insulin. Glycemic control stable on current regimen. Given HbA1C and previous hypoglycemic event patient would likely benefit from discontinuation of sulfonylurea component of combination drug at discharge to reduce risk of hypoglycemia. Patient would also likely benefit from daily POC glucose checks at facility to assess glycemic control and then a decrease in frequency of POC glucose if stable.

## 2022-10-28 NOTE — CARE COORDINATION
ASSESSMENT NOTE    Attending Physician: No att. providers found  Admit Problem: Lower extremity edema [R60.0]  LACY (acute kidney injury) ([address] [N17.9]  Acute kidney injury ([address] [N17.9]  Acute cystitis with hematuria [N30.01]  Date/Time of Admission: 10/26/2022  1:37 PM  Problem List:  Patient Active Problem List   Diagnosis    Dementia without behavioral disturbance ([address]    Type 2 diabetes with nephropathy ([address]    Uterine mass    Atrial fibrillation (HCC)    Chronic anticoagulation    Acquired hypothyroidism    Hypercholesterolemia    Obesity (BMI 30.0-34. 9)    Inability to walk    Acute pain of left knee    Fall from ground level    Acute sepsis ([address]    LACY (acute kidney injury) ([address]    Metabolic acidosis    Transaminitis    Hypernatremia    History of dementia    Memory difficulties    Confusion and disorientation    SIRS (systemic inflammatory response syndrome) (HCC)    Hematuria    Acute UTI    Vaginal bleeding    Lower extremity edema       Service Assessment  Patient Orientation Person, Unable to Assess (Patient has dementia)   Cognition Dementia / Early Alzheimer's   History Provided By Child/Family (sister Ger Duran)   Primary Caregiver Other (Comment) (resident at [facility])   Accompanied By/Relationship     [address] [place] [address] Members, Other (Comment), /   Patient's [address] is: Named in [address] [place] [address]   PCP Verified by CM Yes   Last Visit to PCP Within last 3 months   Prior Functional Level Assistance with the following:, Bathing, Dressing, Toileting, Feeding, Mobility   Current Functional Level Assistance with the following:, Bathing, Dressing, Feeding, Mobility   Can patient return to prior living arrangement No (Patient Acuity to high for facility to meet patient needs)   Ability to make needs known: Unable   Family able to assist with home care needs: No   Would you like for me to discuss the discharge plan with any other family members/significant others, and if so, who? Yes (sister Yogi Osorio is patient's [employer])   Financial Resources Medicare, [address] ([place])   [employer]     CM/SW Referral       Social/Functional History  Lives With Alone, Other (comment) ([address] [facility] [place])   Type of Home Assisted living   Home Layout One level   Home Access     Entrance Stairs - Number of Steps     Entrance Stairs - Rails     Bathroom Shower/Tub     Bathroom Toilet Handicap height   [facility] Help From Family   ADL Assistance Needs assistance   Bath Maximum assistance   Dressing Maximum assistance   Grooming Maximum assistance   Feeding Maximum assistance   Toileting Needs assistance   [address] Work     Driving     Shopping          Other (Comment)     [address] [facility] Paying/Finance [address] Management     Other (Comment)     Ambulation Assistance Needs assistance   Transfer Assistance Needs assistance   Active  No   Patient's  Info     Mode of Transportation     Education     Occupation Retired   Type of Occupation       Discharge [facility] (recomendation  for [employer])   Living Arrangements Other (Comment) (SNF)   [address] Members, Other (Comment), /   Current Services Prior To Admission Other (Comment) ([address] [address])   [address]   DME     DME     DME Ordered?  No   Potential Assistance Purchasing Medications No   Meds-to-Beds: Does the patient want to have any new prescriptions delivered to bedside prior to discharge? Type of Home Care Services     Patient expects to be discharged to: Follow Up Appointment: Best Day/Time     One/Two Story Residence: One story   # of Interior Steps     Height of Each Step (in)     [employer] Available     History of Falls? Yes (Fall in Aug 2022 hx provided by sister Angela Hightower)     [address] Discharge  Transition of Care Consult (CM Consult): SNF   Internal Home Health     Internal Hospice     Reason Outside Agency [address]     Partner SNF     Reason Why Partner SNF Not Chosen     Internal Comfort Care     Reason Outside [address] Discharge     [address] Provided? Mode of Transport at [facility] Time of Discharge     Confirm Follow Up Transport       Condition of Participation: Discharge Planning  The plan for Transition of Care is related to the following treatment goals: The Patient and/or Patient Representative was provided with a Choice of Provider? Name of the Patient Representative who was provided with the Choice of Provider and agrees with the Discharge Plan? The Patient and/or Patient Representative Agree with the Discharge Plan? Freedom of Choice list was provided with basic dialogue that supports the individualized plan of care/goals, treatment preferences, and shares the quality data associated with the providers?  Yes             Cindy Tse RN 11/09/22 8:06 AM

## 2022-10-28 NOTE — PROGRESS NOTES
Initial visit by  to convey care and concern and to explore spiritual needs. Patient appeared asleep and I did not wake her. Chaplains remain available for follow-up care.      Stew Butler 67  Board Certified
